# Patient Record
Sex: FEMALE | Race: ASIAN | NOT HISPANIC OR LATINO | Employment: UNEMPLOYED | URBAN - METROPOLITAN AREA
[De-identification: names, ages, dates, MRNs, and addresses within clinical notes are randomized per-mention and may not be internally consistent; named-entity substitution may affect disease eponyms.]

---

## 2017-01-03 ENCOUNTER — TELEPHONE (OUTPATIENT)
Dept: FAMILY MEDICINE | Facility: CLINIC | Age: 57
End: 2017-01-03

## 2017-01-03 DIAGNOSIS — K21.9 GASTROESOPHAGEAL REFLUX DISEASE WITHOUT ESOPHAGITIS: ICD-10-CM

## 2017-01-03 DIAGNOSIS — R47.02 DYSPHASIA: Primary | ICD-10-CM

## 2017-01-03 NOTE — TELEPHONE ENCOUNTER
PA for Pantoprazole has been requested. In order to summit to the insurance the medication will need diagnosis. List one.   Lakeisha Skaggs

## 2017-01-05 PROBLEM — K21.9 GASTROESOPHAGEAL REFLUX DISEASE WITHOUT ESOPHAGITIS: Status: ACTIVE | Noted: 2017-01-05

## 2017-01-15 DIAGNOSIS — M79.601 RIGHT UPPER LIMB PAIN: Primary | ICD-10-CM

## 2017-01-15 NOTE — TELEPHONE ENCOUNTER
lidocaine (LIDODERM) 5 % patch      Last Written Prescription Date: 11/15/16  Last Fill Quantity: 30,  # refills: 1   Last Office Visit with [facility], [facility] or [facility] prescribing provider: 11/8/16                                         Next 5 appointments (look out 90 days)     Mar 30, 2017 10:20 AM   Office Visit with Mulugeta Sharma MD   [facility] ([facility])    [address] [place] [id number] [phone]

## 2017-01-18 RX ORDER — LIDOCAINE 50 MG/G
PATCH TOPICAL
Qty: 30 PATCH | Refills: 1 | Status: SHIPPED | OUTPATIENT
Start: 2017-01-18 | End: 2017-03-30

## 2017-01-18 NOTE — TELEPHONE ENCOUNTER
Prescription approved per [facility] Refill Protocol.  IFRAH Petty, Clinical RN [facility].

## 2017-01-20 RX ORDER — LIDOCAINE 50 MG/G
PATCH TOPICAL
Qty: 30 PATCH | Refills: 1 | OUTPATIENT
Start: 2017-01-20

## 2017-01-26 ENCOUNTER — TRANSFERRED RECORDS (OUTPATIENT)
Dept: HEALTH INFORMATION MANAGEMENT | Facility: CLINIC | Age: 57
End: 2017-01-26

## 2017-03-08 DIAGNOSIS — Z87.730 PERSONAL HISTORY OF CORRECTED CLEFT LIP AND PALATE: ICD-10-CM

## 2017-03-08 NOTE — TELEPHONE ENCOUNTER
simvastatin (ZOCOR) 20 MG tablet     Last Written Prescription Date: 9/30/16  Last Fill Quantity: 90, # refills: 1  Last Office Visit with FM[facility], [facility] or [facility] prescribing provider: 11/8/16  Next 5 appointments (look out 90 days)     Mar 30, 2017 10:20 AM CDT   Office Visit with Mulugeta Sharma MD   [facility] ([facility])    [address] [place] [id number] [phone]                   Lab Results   Component Value Date    CHOL 167 09/30/2016     Lab Results   Component Value Date    HDL 38 09/30/2016     Lab Results   Component Value Date    LDL 83 09/30/2016    LDL 75 08/03/2015     Lab Results   Component Value Date    TRIG 228 09/30/2016     Lab Results   Component Value Date    CHOLHDLRATIO 5.6 02/03/2015           Lawson Faarax  [facility]

## 2017-03-13 RX ORDER — SIMVASTATIN 20 MG
TABLET ORAL
Qty: 90 TABLET | Refills: 0 | Status: SHIPPED | OUTPATIENT
Start: 2017-03-13 | End: 2017-03-30

## 2017-03-13 NOTE — TELEPHONE ENCOUNTER
Prescription(s) approved per [facility] Refill Protocol.    Mode Wharton RN

## 2017-03-20 DIAGNOSIS — I10 ESSENTIAL HYPERTENSION WITH GOAL BLOOD PRESSURE LESS THAN 140/90: ICD-10-CM

## 2017-03-20 NOTE — TELEPHONE ENCOUNTER
metoprolol (TOPROL-XL) 50 MG 24 hr tablet (Discontinued)      Last Written Prescription Date:  09/30/16  Last Fill Quantity: 90,   # refills: 1  Last Office Visit with [facility], [facility] or [facility] prescribing provider: 11/08/16  Future Office visit:    Next 5 appointments (look out 90 days)     Mar 30, 2017 10:20 AM CDT   Office Visit with Mulugeta Sharma MD   [facility] ([facility])    [address] [place] [id number] [phone]                   Routing refill request to provider for review/approval because:  Drug not active on patient's medication list      Alanis Martin  [facility]

## 2017-03-23 RX ORDER — METOPROLOL SUCCINATE 50 MG/1
TABLET, EXTENDED RELEASE ORAL
Qty: 90 TABLET | Refills: 1 | Status: SHIPPED | OUTPATIENT
Start: 2017-03-23 | End: 2017-09-12

## 2017-03-30 ENCOUNTER — OFFICE VISIT (OUTPATIENT)
Dept: FAMILY MEDICINE | Facility: CLINIC | Age: 57
End: 2017-03-30
Payer: COMMERCIAL

## 2017-03-30 ENCOUNTER — TELEPHONE (OUTPATIENT)
Dept: FAMILY MEDICINE | Facility: CLINIC | Age: 57
End: 2017-03-30

## 2017-03-30 VITALS
TEMPERATURE: 97 F | HEART RATE: 69 BPM | OXYGEN SATURATION: 95 % | SYSTOLIC BLOOD PRESSURE: 129 MMHG | HEIGHT: 59 IN | DIASTOLIC BLOOD PRESSURE: 72 MMHG

## 2017-03-30 DIAGNOSIS — K21.9 GASTROESOPHAGEAL REFLUX DISEASE WITHOUT ESOPHAGITIS: ICD-10-CM

## 2017-03-30 DIAGNOSIS — I10 ESSENTIAL HYPERTENSION WITH GOAL BLOOD PRESSURE LESS THAN 140/90: Primary | ICD-10-CM

## 2017-03-30 DIAGNOSIS — M79.601 RIGHT UPPER LIMB PAIN: ICD-10-CM

## 2017-03-30 DIAGNOSIS — G81.01 RIGHT FLACCID HEMIPLEGIA (H): ICD-10-CM

## 2017-03-30 DIAGNOSIS — M25.511 RIGHT SHOULDER PAIN, UNSPECIFIED CHRONICITY: ICD-10-CM

## 2017-03-30 DIAGNOSIS — Z86.73 HISTORY OF CVA (CEREBROVASCULAR ACCIDENT): ICD-10-CM

## 2017-03-30 LAB
ALT SERPL W P-5'-P-CCNC: 50 U/L (ref 0–50)
CHOLEST SERPL-MCNC: 150 MG/DL
CREAT SERPL-MCNC: 0.88 MG/DL (ref 0.52–1.04)
GFR SERPL CREATININE-BSD FRML MDRD: 67 ML/MIN/1.7M2
HDLC SERPL-MCNC: 46 MG/DL
LDLC SERPL CALC-MCNC: 72 MG/DL
NONHDLC SERPL-MCNC: 104 MG/DL
POTASSIUM SERPL-SCNC: 3.8 MMOL/L (ref 3.4–5.3)
TRIGL SERPL-MCNC: 162 MG/DL

## 2017-03-30 PROCEDURE — 36415 COLL VENOUS BLD VENIPUNCTURE: CPT | Performed by: FAMILY MEDICINE

## 2017-03-30 PROCEDURE — 80061 LIPID PANEL: CPT | Performed by: FAMILY MEDICINE

## 2017-03-30 PROCEDURE — 82565 ASSAY OF CREATININE: CPT | Performed by: FAMILY MEDICINE

## 2017-03-30 PROCEDURE — 99214 OFFICE O/P EST MOD 30 MIN: CPT | Performed by: FAMILY MEDICINE

## 2017-03-30 PROCEDURE — 84132 ASSAY OF SERUM POTASSIUM: CPT | Performed by: FAMILY MEDICINE

## 2017-03-30 PROCEDURE — 84460 ALANINE AMINO (ALT) (SGPT): CPT | Performed by: FAMILY MEDICINE

## 2017-03-30 RX ORDER — LOSARTAN POTASSIUM AND HYDROCHLOROTHIAZIDE 25; 100 MG/1; MG/1
TABLET ORAL
Qty: 90 TABLET | Refills: 0 | Status: SHIPPED | OUTPATIENT
Start: 2017-03-30 | End: 2017-06-02

## 2017-03-30 RX ORDER — NABUMETONE 500 MG/1
TABLET, FILM COATED ORAL
Qty: 60 TABLET | Refills: 5 | Status: SHIPPED | OUTPATIENT
Start: 2017-03-30 | End: 2017-07-15

## 2017-03-30 RX ORDER — SIMVASTATIN 20 MG
20 TABLET ORAL EVERY EVENING
Qty: 90 TABLET | Refills: 1 | Status: SHIPPED | OUTPATIENT
Start: 2017-03-30 | End: 2017-09-12

## 2017-03-30 RX ORDER — PANTOPRAZOLE SODIUM 40 MG/1
40 TABLET, DELAYED RELEASE ORAL DAILY
Qty: 90 TABLET | Refills: 3 | Status: SHIPPED | OUTPATIENT
Start: 2017-03-30 | End: 2018-03-19

## 2017-03-30 RX ORDER — BACLOFEN 10 MG/1
10 TABLET ORAL 3 TIMES DAILY PRN
Qty: 90 TABLET | Refills: 5 | Status: SHIPPED | OUTPATIENT
Start: 2017-03-30 | End: 2017-09-12

## 2017-03-30 RX ORDER — LIDOCAINE 50 MG/G
PATCH TOPICAL
Qty: 90 PATCH | Refills: 3 | Status: SHIPPED | OUTPATIENT
Start: 2017-03-30 | End: 2018-03-19

## 2017-03-30 NOTE — LETTER
[address] [address]  [phone]             April 3, 2017    Anita Bennett  [address] [facility] [address]          Ms. Bennett,     All of your test results were within the expected range for you. Enclosed are the results.  Results for orders placed or performed in visit on 03/30/17   Lipid panel reflex to direct LDL   Result Value Ref Range    Cholesterol 150 <200 mg/dL    Triglycerides 162 (H) <150 mg/dL    HDL Cholesterol 46 (L) >49 mg/dL    LDL Cholesterol Calculated 72 <100 mg/dL    Non HDL Cholesterol 104 <130 mg/dL   ALT   Result Value Ref Range    ALT 50 0 - 50 U/L   Potassium   Result Value Ref Range    Potassium 3.8 3.4 - 5.3 mmol/L   Creatinine   Result Value Ref Range    Creatinine 0.88 0.52 - 1.04 mg/dL    GFR Estimate 67 >60 mL/min/1.7m2    GFR Estimate If Black 81 >60 mL/min/1.7m2       Please contact the clinic if you have additional questions.  Thank you.     Sincerely,       Mulugeta Sharma MD/viridiana

## 2017-03-30 NOTE — PATIENT INSTRUCTIONS
This summary includes the important diagnoses, test, medications and other important parts of your medical history.  Below are a few good we sites you can use to learn more about these.     Www.[employer].org : Up to date and easily searchable information on multiple topics.  Www.[employer].org/Pharmacy/c_539084.asp : [facility] $4.99 medications  Www.medlineplus.gov : medication info, interactive tutorials, watch real surgeries online  Www.familydoctor.org : good info from the Academy of Family Physicians  Www.mayoNew Planet Technologiesinic.com : good info from the [facility]  Www.cdc.gov : public health info, travel advisories, epidemics (H1N1)  Www.aap.org : children's health info, normal development, vaccinations  Www.health.[facility].[place].us : [place] dept of heat, public health issues in [place], N1N1    Based on your medical history and these are the current health maintenance or preventive care services that you are due for (some may have been done at this visit:)  There are no preventive care reminders to display for this patient.  =================================================================================  Normal Values   Blood pressure  <140/90 for most adults    <130/80 for some chronic diseases (ask your care team about yours)    BMI (body mass index)  18.5-25 kg/m2 (based on height and weight)     Thank you for visiting [facility]    Normal or non-critical lab and imaging results will be communicated to you by MyChart, letter or phone within 7 days.  If you do not hear from us within 10 days, please call the clinic. If you have a critical or abnormal lab result, we will notify you by phone as soon as possible.     If you have any questions regarding your visit please contact:     Team Comfort:   Clinic Hours Telephone Number   Dr. Mulugeta Teague   7am-5pm  Monday - Friday [phone]  Mode SIMONS   Pharmacy 8am-8pm  Monday-Thursday      8am-6pm Friday  9am-5pm Saturday-Sunday [phone]   Urgent Care 11am-8pm Monday-Friday        9am-5pm Saturday-Sunday [phone]     After hours, weekend or if you need to make an appointment with your primary provider please call [phone].   After Hours nurse advise: call [place] [facility]: [phone]    Medication Refills:  Call your pharmacy and they will forward the refill to us. Please allow 3 business days for your refills to be completed.    Use [employer] (secure email communication and access to your chart) to send your primary care provider a message or make an appointment. Ask someone on your Team how to sign up for [employer]. To log on to [employer] or for more information in [employer] please visit the website at [email or web address].  As of October 8, 2013, all password changes, disabled accounts, or ID changes in RadioSh[employer]/MyHealth will be done by our Access Services Department.   If you need help with your account or password, call: [phone]. Clinic staff no longer has the ability to change passwords.

## 2017-03-30 NOTE — NURSING NOTE
"Chief Complaint   Patient presents with     Hypertension       Initial /72 (BP Location: Left arm, Patient Position: Chair, Cuff Size: Adult Regular)  Pulse 69  Temp 97  F (36.1  C) (Oral)  Ht 4' 11\" (1.499 m)  SpO2 95% Estimated body mass index is 26.26 kg/(m^2) as calculated from the following:    Height as of 11/8/16: 4' 11\" (1.499 m).    Weight as of 11/8/16: 130 lb (59 kg).  Medication Reconciliation: complete     Pa Mir Ramos MA      "

## 2017-03-30 NOTE — TELEPHONE ENCOUNTER
Dr. Sharma completed form for pt and the form is put in outgoing bin to be mailed to pt's home address. 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  Ashish Lunsford,  For Teams Comfort and Heart

## 2017-03-30 NOTE — MR AVS SNAPSHOT
After Visit Summary   3/30/2017    Anita Bennett    MRN: [id number]           Patient Information     Date Of Birth          1960        Visit Information        Provider Department      3/30/2017 10:15 AM Mulugeta Sharma MD; [employer] [facility]        Today's Diagnoses     Essential hypertension with goal blood pressure less than 140/90    -  1    History of CVA (cerebrovascular accident)        Right flaccid hemiplegia (H)        Right upper limb pain        Right shoulder pain, unspecified chronicity        Gastroesophageal reflux disease without esophagitis          Care Instructions    This summary includes the important diagnoses, test, medications and other important parts of your medical history.  Below are a few good we sites you can use to learn more about these.     Www.[employer] : Up to date and easily searchable information on multiple topics.  Www.[employer]/Pharmacy/c_539084.asp : [facility] $4.99 medications  Www.[employer].gov : medication info, interactive tutorials, watch real surgeries online  Www.familydoctor.org : good info from the Academy of Family Physicians  Www.mayoThrillist.cominic.com : good info from the [facility]  Www.cdc.gov : public health info, travel advisories, epidemics (H1N1)  Www.aap.org : children's health info, normal development, vaccinations  Www.health.[facility].mn.us : [place] dept of heatlh, public health issues in [place], N1N1    Based on your medical history and these are the current health maintenance or preventive care services that you are due for (some may have been done at this visit:)  There are no preventive care reminders to display for this patient.  =================================================================================  Normal Values   Blood pressure  <140/90 for most adults    <130/80 for some chronic diseases (ask your care team about yours)    BMI (body mass index)  18.5-25 kg/m2 (based on height  and weight)     Thank you for visiting [facility]    Normal or non-critical lab and imaging results will be communicated to you by MyChart, letter or phone within 7 days.  If you do not hear from us within 10 days, please call the clinic. If you have a critical or abnormal lab result, we will notify you by phone as soon as possible.     If you have any questions regarding your visit please contact:     Team Comfort:   Clinic Hours Telephone Number   Dr. Mulugeta Teague   7am-5pm  Monday - Friday [phone]  Mode RN   Pharmacy 8am-8pm Monday-Thursday      8am-6pm Friday  9am-5pm Saturday-Sunday [phone]   Urgent Care 11am-8pm Monday-Friday        9am-5pm Saturday-Sunday [phone]     After hours, weekend or if you need to make an appointment with your primary provider please call [phone].   After Hours nurse advise: call [place] [facility]: [phone]    Medication Refills:  Call your pharmacy and they will forward the refill to us. Please allow 3 business days for your refills to be completed.    Use [employer]OOKSIMA (secure email communication and access to your chart) to send your primary care provider a message or make an appointment. Ask someone on your Team how to sign up for [employer]IMA. To log on to [employer] or for more information in [employer] please visit the website at [email or web address].  As of October 8, 2013, all password changes, disabled accounts, or ID changes in LOOKSIMA/MyHealth will be done by our Access Services Department.   If you need help with your account or password, call: [phone]. Clinic staff no longer has the ability to change passwords.           Follow-ups after your visit        Follow-up notes from your care team     Return in about 6 months (around 9/18/2017) for full physical, blood pressure check, lab tests.      Who to contact     If you have questions or need  "follow up information about today's clinic visit or your schedule please contact [facility] directly at [phone].  Normal or non-critical lab and imaging results will be communicated to you by MyChart, letter or phone within 4 business days after the clinic has received the results. If you do not hear from us within 7 days, please contact the clinic through Algol[employer] or phone. If you have a critical or abnormal lab result, we will notify you by phone as soon as possible.  Submit refill requests through [employer] or call your pharmacy and they will forward the refill request to us. Please allow 3 business days for your refill to be completed.          Additional Information About Your Visit        Algolia[employer]Net Information     [employer] lets you send messages to your doctor, view your test results, renew your prescriptions, schedule appointments and more. To sign up, go to [email or web address] . Click on \"Log in\" on the left side of the screen, which will take you to the Welcome page. Then click on \"Sign up Now\" on the right side of the page.     You will be asked to enter the access code listed below, as well as some personal information. Please follow the directions to create your username and password.     Your access code is: [email or web address]  Expires: 2017 11:00 AM     Your access code will  in 90 days. If you need help or a new code, please call your [place] clinic or [phone].        Care EveryWhere ID     This is your Care EveryWhere ID. This could be used by other organizations to access your [place] medical records  [id number]        Your Vitals Were     Pulse Temperature Height Pulse Oximetry          69 97  F (36.1  C) (Oral) 4' 11\" (1.499 m) 95%         Blood Pressure from Last 3 Encounters:   17 129/72   16 120/72   10/31/16 112/70    Weight from Last 3 Encounters:   16 130 lb (59 kg)   16 130 lb (59 kg)   12/10/15 130 lb (59 kg)              We " Performed the Following     ALT     Creatinine     Lipid panel reflex to direct LDL     Potassium          Today's Medication Changes          These changes are accurate as of: 3/30/17 11:00 AM.  If you have any questions, ask your nurse or doctor.               These medicines have changed or have updated prescriptions.        Dose/Directions    lidocaine 5 % Patch   Commonly known as:  LIDODERM   This may have changed:  See the new instructions.   Used for:  Right upper limb pain, Right flaccid hemiplegia (H)   Changed by:  Mulugeta Sharma MD        APPLY 1 PATCH TO PAINFUL AREA FOR UP TO 12 HOURS WITHIN A 24 HOUR PERIOD. REMOVE AFTER 12 HOURS.   Quantity:  90 patch   Refills:  3       nabumetone 500 MG tablet   Commonly known as:  RELAFEN   This may have changed:  See the new instructions.   Used for:  Right shoulder pain, unspecified chronicity, Right upper limb pain   Changed by:  Mulugeta Sharma MD        TAKE 1-2 TABLETS BY MOUTH TWICE A DAY AS NEEDED FOR PAIN IN ARM OR HIP, TAKE WITH FOOD.   Quantity:  60 tablet   Refills:  5       pantoprazole 40 MG EC tablet   Commonly known as:  PROTONIX   This may have changed:  See the new instructions.   Used for:  Gastroesophageal reflux disease without esophagitis   Changed by:  Mulugeta Sharma MD        Dose:  40 mg   Take 1 tablet (40 mg) by mouth daily for reflux.   Quantity:  90 tablet   Refills:  3            Where to get your medicines      These medications were sent to [facility]/pharmacy #[id number] - [place], [place] - [address]  [address] [address]     Phone:  [phone]     baclofen 10 MG tablet    losartan-hydrochlorothiazide 100-25 MG per tablet    nabumetone 500 MG tablet    pantoprazole 40 MG EC tablet    simvastatin 20 MG tablet         Call your pharmacy to confirm that your medication is ready for pickup. It may take up to 24 hours for them to receive the prescription. If the prescription is not ready within 3  business days, please contact your clinic or your provider.     We will let you know when these medications are ready. If you don't hear back within 3 business days, please contact us.     lidocaine 5 % Patch                Primary Care Provider Office Phone # Fax #    Mulugeta Sharma [phone]       [facility] [address]  [facility] [address]        Thank you!     Thank you for choosing [facility]  for your care. Our goal is always to provide you with excellent care. Hearing back from our patients is one way we can continue to improve our services. Please take a few minutes to complete the written survey that you may receive in the mail after your visit with us. Thank you!             Your Updated Medication List - Protect others around you: Learn how to safely use, store and throw away your medicines at www.disposemymeds.org.          This list is accurate as of: 3/30/17 11:00 AM.  Always use your most recent med list.                   Brand Name Dispense Instructions for use    azelastine 0.05 % Soln ophthalmic solution    OPTIVAR    1 Bottle    Apply 1 drop to eye 2 times daily       baclofen 10 MG tablet    LIORESAL    90 tablet    Take 1 tablet (10 mg) by mouth 3 times daily as needed for muscle spasms (arm pain)       [facility] ASPIRIN 325 MG tablet   Generic drug:  aspirin     90 tablet    TAKE 1 TABLET BY MOUTH DAILY       gabapentin 300 MG capsule    NEURONTIN    270 capsule    Take 1 capsule (300 mg) by mouth 3 times daily for arm mobility.       lidocaine 5 % Patch    LIDODERM    90 patch    APPLY 1 PATCH TO PAINFUL AREA FOR UP TO 12 HOURS WITHIN A 24 HOUR PERIOD. REMOVE AFTER 12 HOURS.       losartan-hydrochlorothiazide 100-25 MG per tablet    HYZAAR    90 tablet    1/2 tablet daily as needed for high blood pressure.       metoprolol 50 MG 24 hr tablet    TOPROL-XL    90 tablet    TAKE 1 TABLET (50 MG) BY MOUTH DAILY FOR BLOOD PRESSURE.        nabumetone 500 MG tablet    RELAFEN    60 tablet    TAKE 1-2 TABLETS BY MOUTH TWICE A DAY AS NEEDED FOR PAIN IN ARM OR HIP, TAKE WITH FOOD.       order for DME     1 Month    Equipment being ordered: Blood pressure machine. Large Gloves. Large Adult Diapers/Depends. Disposable pad. Sheet skin bed pad. Quantity limits and brand per insurance preference.       pantoprazole 40 MG EC tablet    PROTONIX    90 tablet    Take 1 tablet (40 mg) by mouth daily for reflux.       polyvinyl alcohol 1.4 % ophthalmic solution    ARTIFICIAL TEARS    6 mL    Place 1 drop into both eyes 4 times daily       senna-docusate 8.6-50 MG per tablet    SENOKOT-S;PERICOLACE    30 tablet    Take 1 tablet by mouth daily as needed for constipation       simvastatin 20 MG tablet    ZOCOR    90 tablet    Take 1 tablet (20 mg) by mouth every evening for cholesterol.       sulfacetamide-prednisoLONE 10-0.23 % ophthalmic solution    VASOCIDIN    5 mL    Place 1 drop into the right eye 2 times daily as needed , or as directed by Dr. Rai.

## 2017-03-30 NOTE — PROGRESS NOTES
"  SUBJECTIVE:                                                    Anita Bennett is a 56 year old female who presents to clinic today for the following health issues:  She is accompanied by her  and .     Hypertension Follow-up      Outpatient blood pressures are being checked at home. Patient brought in BP log. Results are 130/80.    Low Salt Diet: low salt       Amount of exercise or physical activity: 6-7 days/week for an average of less than 15 minutes    Problems taking medications regularly: No    Medication side effects: none    Diet: low salt    Past medical, family, and social histories, medications, and allergies are reviewed and updated in Epic.     ROS:  Constitutional, HEENT, cardiovascular, pulmonary, gi and gu systems are negative, except as otherwise noted.      Any history above obtained by the Medical Assistant was reviewed by Dr. Mulugeta Sharma MD, and edited when necessary.    This document serves as a record of the services and decisions personally performed and made by Dr. Sharma. It was created on his behalf by Kerry Zarate, a trained medical scribe. The creation of this document is based on the provider's statements to the medical scribe.  Kerry Zarate March 30, 2017 10:37 AM   OBJECTIVE:                                                    /72 (BP Location: Left arm, Patient Position: Chair, Cuff Size: Adult Regular)  Pulse 69  Temp 97  F (36.1  C) (Oral)  Ht 4' 11\" (1.499 m)  SpO2 95%  There is no height or weight on file to calculate BMI.  GENERAL: healthy, alert and no distress  EYES: Eyes grossly normal to inspection, PERRL and conjunctivae and sclerae normal  RESP: lungs clear to auscultation - no rales, rhonchi or wheezes  CV: regular rate and rhythm, normal S1 S2, no S3 or S4, no murmur, click or rub, no peripheral edema and peripheral pulses strong  MS: no gross musculoskeletal defects noted, no edema. Sitting in wheelchair.   SKIN: no suspicious lesions or " raúl  NEURO: RUE flaccid paralysis, RLE with a brace to counter her foot drop, sensory exam grossly normal, mentation intact and speech present, however, I cannot assess it since it is not English ( speaks for her most of the time).   PSYCH: mentation appears normal, affect normal/bright     ASSESSMENT/PLAN:                                                    (I10) Essential hypertension with goal blood pressure less than 140/90  (primary encounter diagnosis)  Comment: well controlled  Plan: losartan-hydrochlorothiazide (HYZAAR) 100-25 MG        per tablet, Potassium, Creatinine        Follow up in 6 months at E    (Z86.73) History of CVA (cerebrovascular accident)  Comment: fasting. Medications updated.   Plan: baclofen (LIORESAL) 10 MG tablet, simvastatin         (ZOCOR) 20 MG tablet, Lipid panel reflex to         direct LDL, ALT            (G81.01) Right flaccid hemiplegia (H)  (M79.601) Right upper limb pain  (M25.511) Right shoulder pain, unspecified chronicity  Comment: refill request. She reports that the lidocaine helps when it is available.   Plan: lidocaine (LIDODERM) 5 % Patch, nabumetone         (RELAFEN) 500 MG tablet            (K21.9) Gastroesophageal reflux disease without esophagitis  Comment: prescription update   Plan: pantoprazole (PROTONIX) 40 MG EC tablet               The information in this document, created by the medical scribe for me, accurately reflects the services I personally performed and the decisions made by me. I have reviewed and approved this document for accuracy prior to leaving the patient care area.    Mulugeta Sharma MD

## 2017-04-04 DIAGNOSIS — Z86.73 HISTORY OF CVA (CEREBROVASCULAR ACCIDENT): ICD-10-CM

## 2017-04-05 RX ORDER — SIMVASTATIN 20 MG
TABLET ORAL
Qty: 90 TABLET | Refills: 0 | OUTPATIENT
Start: 2017-04-05

## 2017-04-18 ENCOUNTER — TELEPHONE (OUTPATIENT)
Dept: FAMILY MEDICINE | Facility: CLINIC | Age: 57
End: 2017-04-18

## 2017-04-18 NOTE — TELEPHONE ENCOUNTER
Medication: pantoprazole (PROTONIX) 40 MG EC tablet    Diagnosis & Code : Gastroesophageal reflux disease without esophagitis [K21.9]         Provider: What other medications tried, failed, when and why discontinue?      Prior Authorization Starts (date): 4/18/2017    Insurance Plan: [employer]  Patient ID: [id number]  Phone: [phone]  On the phone  Route it to the team comfort  Postponed for 3 business days as protocol.  Lakeisha Skaggs

## 2017-04-18 NOTE — TELEPHONE ENCOUNTER
Prior Authorization for pantoprazole (PROTONIX) 40 MG EC tablet was approved on April 18, 2017.  Effective date: 4/18/2018  PA reference #: [id number]  Pharmacy was notified by plan  Patient will be notified by the insurance plan. Letter sent by insurance company and letter forwarded to Abstracting team.  Lakeisha Skaggs

## 2017-04-18 NOTE — TELEPHONE ENCOUNTER
Faxed Pa request from [facility]    Plan does not cover pantoprazole (PROTONIX) 40 MG EC tablet.  Please call [phone] to initiate Prior Auth or change med.    Quantity limits exceeded.     ID# [id number]      [facility]

## 2017-04-26 DIAGNOSIS — I10 ESSENTIAL HYPERTENSION WITH GOAL BLOOD PRESSURE LESS THAN 140/90: ICD-10-CM

## 2017-04-26 NOTE — TELEPHONE ENCOUNTER
losartan-hydrochlorothiazide (HYZAAR) 100-25 MG per tablet      Last Written Prescription Date: 3/30/17  Last Fill Quantity: 90, # refills: 0  Last Office Visit with [facility], [facility] or [facility] prescribing provider: 3/30/17       Potassium   Date Value Ref Range Status   03/30/2017 3.8 3.4 - 5.3 mmol/L Final     Creatinine   Date Value Ref Range Status   03/30/2017 0.88 0.52 - 1.04 mg/dL Final     BP Readings from Last 3 Encounters:   03/30/17 129/72   11/08/16 120/72   10/31/16 112/70

## 2017-05-01 RX ORDER — LOSARTAN POTASSIUM AND HYDROCHLOROTHIAZIDE 25; 100 MG/1; MG/1
TABLET ORAL
Qty: 90 TABLET | Refills: 0 | OUTPATIENT
Start: 2017-05-01

## 2017-05-01 NOTE — TELEPHONE ENCOUNTER
Six month supply was given in March, refill request too soon, pharmacy notified.     IFRAH Petty, Clinical RN [facility].

## 2017-05-04 ENCOUNTER — TRANSFERRED RECORDS (OUTPATIENT)
Dept: HEALTH INFORMATION MANAGEMENT | Facility: CLINIC | Age: 57
End: 2017-05-04

## 2017-05-13 DIAGNOSIS — Z53.9 DIAGNOSIS NOT YET DEFINED: ICD-10-CM

## 2017-05-13 NOTE — TELEPHONE ENCOUNTER
senna-docusate (SENOKOT-S;PERICOLACE) 8.6-50 MG per tablet      Last Written Prescription Date: 5/18/16  Last Fill Quantity: 30,  # refills: 11  Last Office Visit with [facility], [facility] or [facility] prescribing provider: 3/30/17          Lawson Lockett  [facility]

## 2017-05-17 NOTE — TELEPHONE ENCOUNTER
Routing refill request to provider for review/approval because:  Diagnosis needed.  Tammie Yang RN

## 2017-05-18 RX ORDER — INCONTINENCE PAD,LINER,DISP
EACH MISCELLANEOUS
Qty: 30 TABLET | Refills: 11 | Status: SHIPPED | OUTPATIENT
Start: 2017-05-18 | End: 2018-03-28

## 2017-05-28 DIAGNOSIS — I10 ESSENTIAL HYPERTENSION WITH GOAL BLOOD PRESSURE LESS THAN 140/90: ICD-10-CM

## 2017-05-28 NOTE — TELEPHONE ENCOUNTER
losartan-hydrochlorothiazide (HYZAAR) 100-25 MG per tablet      Last Written Prescription Date: 3/30/17  Last Fill Quantity: 90, # refills: 0  Last Office Visit with [facility], [facility] or [facility] prescribing provider: 3/30/17       Potassium   Date Value Ref Range Status   03/30/2017 3.8 3.4 - 5.3 mmol/L Final     Creatinine   Date Value Ref Range Status   03/30/2017 0.88 0.52 - 1.04 mg/dL Final     BP Readings from Last 3 Encounters:   03/30/17 129/72   11/08/16 120/72   10/31/16 112/70           Lawson Faarax  [facility]

## 2017-06-02 RX ORDER — LOSARTAN POTASSIUM AND HYDROCHLOROTHIAZIDE 25; 100 MG/1; MG/1
TABLET ORAL
Qty: 90 TABLET | Refills: 0 | Status: SHIPPED | OUTPATIENT
Start: 2017-06-02 | End: 2017-09-12

## 2017-06-02 NOTE — TELEPHONE ENCOUNTER
Prescription approved per [facility] [facility].  Ramya Barnes RN

## 2017-06-08 DIAGNOSIS — Z86.73 HISTORY OF CVA (CEREBROVASCULAR ACCIDENT): ICD-10-CM

## 2017-06-09 RX ORDER — SIMVASTATIN 20 MG
TABLET ORAL
Qty: 90 TABLET | Refills: 0 | OUTPATIENT
Start: 2017-06-09

## 2017-06-09 NOTE — TELEPHONE ENCOUNTER
The following prescription was sent to [facility] in [place], [place]:    simvastatin (ZOCOR) 20 MG tablet 90 tablet 1 3/30/2017  No   Sig: Take 1 tablet (20 mg) by mouth every evening for cholesterol.   Class: E-Prescribe   Route: Oral   Order: [id number]   E-Prescribing Status: Receipt confirmed by pharmacy (3/30/2017 10:59 AM CDT)     Request denied.  Too soon to fill.  Tammie Yang RN

## 2017-06-14 DIAGNOSIS — Z53.9 DIAGNOSIS NOT YET DEFINED: Primary | ICD-10-CM

## 2017-06-14 PROCEDURE — 99207 C MD RECERTIFICATION HHA PT: CPT | Performed by: FAMILY MEDICINE

## 2017-07-10 DIAGNOSIS — H10.13 ALLERGIC CONJUNCTIVITIS, BILATERAL: ICD-10-CM

## 2017-07-10 RX ORDER — AZELASTINE HYDROCHLORIDE 0.5 MG/ML
1 SOLUTION/ DROPS OPHTHALMIC 2 TIMES DAILY
Qty: 1 BOTTLE | Refills: 6 | Status: SHIPPED | OUTPATIENT
Start: 2017-07-10 | End: 2018-07-13

## 2017-07-15 DIAGNOSIS — M79.601 RIGHT UPPER LIMB PAIN: ICD-10-CM

## 2017-07-15 DIAGNOSIS — M25.511 RIGHT SHOULDER PAIN, UNSPECIFIED CHRONICITY: ICD-10-CM

## 2017-07-15 NOTE — TELEPHONE ENCOUNTER
nabumetone (RELAFEN) 500 MG tablet      Last Written Prescription Date: 3/30/17  Last Quantity: 60, # refills: 5  Last Office Visit with [facility], P or [facility] prescribing provider: 3/30/17  Next 5 appointments (look out 90 days)     Sep 12, 2017  9:00 AM CDT   Office Visit with Mulugeta Sharma MD   [facility] ([facility]    [address] [place] [id number] [phone]                   Creatinine   Date Value Ref Range Status   03/30/2017 0.88 0.52 - 1.04 mg/dL Final     No results found for: AST  Lab Results   Component Value Date    ALT 50 03/30/2017     BP Readings from Last 3 Encounters:   03/30/17 129/72   11/08/16 120/72   10/31/16 112/70

## 2017-07-18 RX ORDER — NABUMETONE 500 MG/1
TABLET, FILM COATED ORAL
Qty: 60 TABLET | Refills: 5 | Status: SHIPPED | OUTPATIENT
Start: 2017-07-18 | End: 2018-01-13

## 2017-07-18 NOTE — TELEPHONE ENCOUNTER
Prescription approved per [facility]ill [facility].  IFRAH Petty, Clinical RN Jade Palacios.

## 2017-08-07 DIAGNOSIS — K21.9 GASTROESOPHAGEAL REFLUX DISEASE WITHOUT ESOPHAGITIS: ICD-10-CM

## 2017-08-08 NOTE — TELEPHONE ENCOUNTER
pantoprazole (PROTONIX) 40 MG EC tablet      Last Written Prescription Date: 3/30/17  Last Fill Quantity: 90,  # refills: 3   Last Office Visit with [facility], [facility] or [facility] prescribing provider: 3/30/17                                         Next 5 appointments (look out 90 days)     Sep 12, 2017  9:00 AM CDT   Office Visit with Mulugeta Sharma MD   [facility]    [address] [place] [id number] [phone]                    Yuil Garrison  [facility]

## 2017-08-12 RX ORDER — PANTOPRAZOLE SODIUM 40 MG/1
TABLET, DELAYED RELEASE ORAL
Qty: 90 TABLET | Refills: 2 | OUTPATIENT
Start: 2017-08-12

## 2017-08-12 NOTE — TELEPHONE ENCOUNTER
Medication is being denied due to: RX was sent 03/30/2017 for #90 with 3 refills.  No additional refills should be needed at this time.   Ramya Barnes RN

## 2017-08-24 ENCOUNTER — TRANSFERRED RECORDS (OUTPATIENT)
Dept: HEALTH INFORMATION MANAGEMENT | Facility: CLINIC | Age: 57
End: 2017-08-24

## 2017-09-12 ENCOUNTER — TELEPHONE (OUTPATIENT)
Dept: FAMILY MEDICINE | Facility: CLINIC | Age: 57
End: 2017-09-12

## 2017-09-12 ENCOUNTER — OFFICE VISIT (OUTPATIENT)
Dept: FAMILY MEDICINE | Facility: CLINIC | Age: 57
End: 2017-09-12
Payer: MEDICARE

## 2017-09-12 VITALS
BODY MASS INDEX: 26.21 KG/M2 | HEART RATE: 74 BPM | HEIGHT: 59 IN | WEIGHT: 130 LBS | SYSTOLIC BLOOD PRESSURE: 132 MMHG | DIASTOLIC BLOOD PRESSURE: 74 MMHG | TEMPERATURE: 98.5 F | OXYGEN SATURATION: 98 %

## 2017-09-12 DIAGNOSIS — G81.01 RIGHT FLACCID HEMIPLEGIA (H): ICD-10-CM

## 2017-09-12 DIAGNOSIS — Z23 NEED FOR INFLUENZA VACCINATION: ICD-10-CM

## 2017-09-12 DIAGNOSIS — Z86.73 HISTORY OF CVA (CEREBROVASCULAR ACCIDENT): ICD-10-CM

## 2017-09-12 DIAGNOSIS — I10 ESSENTIAL HYPERTENSION WITH GOAL BLOOD PRESSURE LESS THAN 140/90: Primary | ICD-10-CM

## 2017-09-12 LAB
ALT SERPL W P-5'-P-CCNC: 17 U/L (ref 0–50)
ANION GAP SERPL CALCULATED.3IONS-SCNC: 6 MMOL/L (ref 3–14)
BUN SERPL-MCNC: 18 MG/DL (ref 7–30)
CALCIUM SERPL-MCNC: 8.8 MG/DL (ref 8.5–10.1)
CHLORIDE SERPL-SCNC: 108 MMOL/L (ref 94–109)
CHOLEST SERPL-MCNC: 146 MG/DL
CO2 SERPL-SCNC: 28 MMOL/L (ref 20–32)
CREAT SERPL-MCNC: 0.89 MG/DL (ref 0.52–1.04)
GFR SERPL CREATININE-BSD FRML MDRD: 65 ML/MIN/1.7M2
GLUCOSE SERPL-MCNC: 96 MG/DL (ref 70–99)
HDLC SERPL-MCNC: 43 MG/DL
LDLC SERPL CALC-MCNC: 77 MG/DL
NONHDLC SERPL-MCNC: 103 MG/DL
POTASSIUM SERPL-SCNC: 3.7 MMOL/L (ref 3.4–5.3)
SODIUM SERPL-SCNC: 142 MMOL/L (ref 133–144)
TRIGL SERPL-MCNC: 128 MG/DL

## 2017-09-12 PROCEDURE — 90686 IIV4 VACC NO PRSV 0.5 ML IM: CPT | Performed by: FAMILY MEDICINE

## 2017-09-12 PROCEDURE — 80061 LIPID PANEL: CPT | Performed by: FAMILY MEDICINE

## 2017-09-12 PROCEDURE — 99214 OFFICE O/P EST MOD 30 MIN: CPT | Mod: 25 | Performed by: FAMILY MEDICINE

## 2017-09-12 PROCEDURE — 36415 COLL VENOUS BLD VENIPUNCTURE: CPT | Performed by: FAMILY MEDICINE

## 2017-09-12 PROCEDURE — 84460 ALANINE AMINO (ALT) (SGPT): CPT | Performed by: FAMILY MEDICINE

## 2017-09-12 PROCEDURE — G0008 ADMIN INFLUENZA VIRUS VAC: HCPCS | Performed by: FAMILY MEDICINE

## 2017-09-12 PROCEDURE — 80048 BASIC METABOLIC PNL TOTAL CA: CPT | Performed by: FAMILY MEDICINE

## 2017-09-12 PROCEDURE — T1013 SIGN LANG/ORAL INTERPRETER: HCPCS | Mod: U3 | Performed by: FAMILY MEDICINE

## 2017-09-12 RX ORDER — LOSARTAN POTASSIUM AND HYDROCHLOROTHIAZIDE 25; 100 MG/1; MG/1
TABLET ORAL
Qty: 90 TABLET | Refills: 0 | Status: SHIPPED | OUTPATIENT
Start: 2017-09-12 | End: 2018-03-19

## 2017-09-12 RX ORDER — GABAPENTIN 300 MG/1
300 CAPSULE ORAL 3 TIMES DAILY
Qty: 270 CAPSULE | Refills: 3 | Status: SHIPPED | OUTPATIENT
Start: 2017-09-12 | End: 2017-09-13

## 2017-09-12 RX ORDER — BACLOFEN 10 MG/1
10 TABLET ORAL 3 TIMES DAILY PRN
Qty: 90 TABLET | Refills: 5 | Status: SHIPPED | OUTPATIENT
Start: 2017-09-12 | End: 2018-03-19

## 2017-09-12 RX ORDER — METOPROLOL SUCCINATE 50 MG/1
TABLET, EXTENDED RELEASE ORAL
Qty: 90 TABLET | Refills: 1 | Status: SHIPPED | OUTPATIENT
Start: 2017-09-12 | End: 2018-03-19

## 2017-09-12 RX ORDER — SIMVASTATIN 20 MG
20 TABLET ORAL EVERY EVENING
Qty: 90 TABLET | Refills: 1 | Status: SHIPPED | OUTPATIENT
Start: 2017-09-12 | End: 2018-03-19

## 2017-09-12 ASSESSMENT — PAIN SCALES - GENERAL: PAINLEVEL: NO PAIN (0)

## 2017-09-12 NOTE — PATIENT INSTRUCTIONS
================================================================================  Normal Values   Blood pressure  <140/90 for most adults    <130/80 for some chronic diseases (ask your care team about yours)    BMI (body mass index)  18.5-25 kg/m2 (based on height and weight)     Thank you for visiting [facility]    Normal or non-critical lab and imaging results will be communicated to you by MyChart, letter or phone within 7 days.  If you do not hear from us within 10 days, please call the clinic. If you have a critical or abnormal lab result, we will notify you by phone as soon as possible.     If you have any questions regarding your visit please contact:     Team Comfort:   Clinic Hours Telephone Number   Dr. Mulugeta Marie 7am-5pm  Monday - Friday [phone]  Jaspreet Cho RN   Pharmacy 8:00am-8pm Monday-Friday    9am-5pm Saturday-Sunday [phone]   Urgent Care 11am-9pm Monday-Friday        9am-5pm Saturday-Sunday [phone]     After hours, weekend or if you need to make an appointment with your primary provider please call [phone].   After Hours nurse advise: call [facility]: [phone]    Medication Refills:  Call your pharmacy and they will forward the refill to us. Please allow 3 business days for your refills to be completed.

## 2017-09-12 NOTE — MR AVS SNAPSHOT
After Visit Summary   9/12/2017    Anita Bennett    MRN: [id number]           Patient Information     Date Of Birth          1960        Visit Information        Provider Department      9/12/2017 8:45 AM Mulugeta Sharma MD; [employer] [facility]        Today's Diagnoses     Essential hypertension with goal blood pressure less than 140/90    -  1    History of CVA (cerebrovascular accident)        Right flaccid hemiplegia (H)        Need for influenza vaccination          Care Instructions        ================================================================================  Normal Values   Blood pressure  <140/90 for most adults    <130/80 for some chronic diseases (ask your care team about yours)    BMI (body mass index)  18.5-25 kg/m2 (based on height and weight)     Thank you for visiting [facility]    Normal or non-critical lab and imaging results will be communicated to you by MyChart, letter or phone within 7 days.  If you do not hear from us within 10 days, please call the clinic. If you have a critical or abnormal lab result, we will notify you by phone as soon as possible.     If you have any questions regarding your visit please contact:     Team Comfort:   Clinic Hours Telephone Number   Dr. Mulugeta Marie 7am-5pm  Monday - Friday [phone]  Jaspreet Cho RN   Pharmacy 8:00am-8pm Monday-Friday    9am-5pm Saturday-Sunday [phone]   Urgent Care 11am-9pm Monday-Friday        9am-5pm Saturday-Sunday [phone]     After hours, weekend or if you need to make an appointment with your primary provider please call [phone].   After Hours nurse advise: call [facility]: [phone]    Medication Refills:  Call your pharmacy and they will forward the refill to us. Please allow 3 business days for your refills to be  "completed.                  Follow-ups after your visit        Follow-up notes from your care team     Return in about 6 months (around 3/12/2018) for blood pressure check, lab tests.      Who to contact     If you have questions or need follow up information about today's clinic visit or your schedule please contact [facility] [place] directly at [phone].  Normal or non-critical lab and imaging results will be communicated to you by MyChart, letter or phone within 4 business days after the clinic has received the results. If you do not hear from us within 7 days, please contact the clinic through [facility] or phone. If you have a critical or abnormal lab result, we will notify you by phone as soon as possible.  Submit refill requests through [employer] or call your pharmacy and they will forward the refill request to us. Please allow 3 business days for your refill to be completed.          Additional Information About Your Visit        [facility] Information     [employer] lets you send messages to your doctor, view your test results, renew your prescriptions, schedule appointments and more. To sign up, go to [email or web address] . Click on \"Log in\" on the left side of the screen, which will take you to the Welcome page. Then click on \"Sign up Now\" on the right side of the page.     You will be asked to enter the access code listed below, as well as some personal information. Please follow the directions to create your username and password.     Your access code is: [email or web address]  Expires: 2017  9:43 AM     Your access code will  in 90 days. If you need help or a new code, please call your [facility] clinic or [phone].        Care EveryWhere ID     This is your Care EveryWhere ID. This could be used by other organizations to access your [facility] medical records  [id number]        Your Vitals Were     Pulse Temperature Height Pulse Oximetry BMI (Body Mass Index)       74 98.5  F (36.9  C) " "(Oral) 1.499 m (4' 11\") 98% 26.26 kg/m2        Blood Pressure from Last 3 Encounters:   09/12/17 132/74   03/30/17 129/72   11/08/16 120/72    Weight from Last 3 Encounters:   09/12/17 59 kg (130 lb)   11/08/16 59 kg (130 lb)   02/08/16 59 kg (130 lb)              We Performed the Following     ALT     Basic metabolic panel     HC FLU VAC PRESRV FREE QUAD SPLIT VIR 3+YRS IM     Lipid panel reflex to direct LDL          Today's Medication Changes          These changes are accurate as of: 9/12/17  9:43 AM.  If you have any questions, ask your nurse or doctor.               These medicines have changed or have updated prescriptions.        Dose/Directions    losartan-hydrochlorothiazide 100-25 MG per tablet   Commonly known as:  HYZAAR   This may have changed:  See the new instructions.   Used for:  Essential hypertension with goal blood pressure less than 140/90   Changed by:  Mulugeta Sharma MD        TAKE 1/2 TABLET BY MOUTH DAILY AS NEEDED FOR HIGH BLOOD PRESSURE.   Quantity:  90 tablet   Refills:  0       metoprolol 50 MG 24 hr tablet   Commonly known as:  TOPROL-XL   This may have changed:  See the new instructions.   Used for:  Essential hypertension with goal blood pressure less than 140/90   Changed by:  Mulugeta Sharma MD        TAKE 1 TABLET (50 MG) BY MOUTH DAILY FOR BLOOD PRESSURE.   Quantity:  90 tablet   Refills:  1            Where to get your medicines      These medications were sent to [facility]/pharmacy #[id number] - [place], [place] - [address]  [address] [address]     Phone:  [phone]     baclofen 10 MG tablet    gabapentin 300 MG capsule    losartan-hydrochlorothiazide 100-25 MG per tablet    metoprolol 50 MG 24 hr tablet    simvastatin 20 MG tablet                Primary Care Provider Office Phone # Fax #    Mulugeta Sharma [phone]       [address]  [facility] [address]        Equal Access to Services     MARIELA GUERRERO AH: Hadii aad ellis " jesse Westfall, waaxda luqadaha, qaybta kaalmada flower, yamil oakes lacharleefernando hemant. So [facility] [phone].    ATENCIÓN: Si gloriala bolivar, tiene a lucero disposición servicios gratuitos de asistencia lingüística. Didier al [phone].    We comply with applicable federal civil rights laws and [place] laws. We do not discriminate on the basis of race, color, national origin, age, disability sex, sexual orientation or gender identity.            Thank you!     Thank you for choosing [facility]  for your care. Our goal is always to provide you with excellent care. Hearing back from our patients is one way we can continue to improve our services. Please take a few minutes to complete the written survey that you may receive in the mail after your visit with us. Thank you!             Your Updated Medication List - Protect others around you: Learn how to safely use, store and throw away your medicines at www.disposemymeds.org.          This list is accurate as of: 9/12/17  9:43 AM.  Always use your most recent med list.                   Brand Name Dispense Instructions for use Diagnosis    azelastine 0.05 % Soln ophthalmic solution    OPTIVAR    1 Bottle    Apply 1 drop to eye 2 times daily    Allergic conjunctivitis, bilateral       baclofen 10 MG tablet    LIORESAL    90 tablet    Take 1 tablet (10 mg) by mouth 3 times daily as needed for muscle spasms (arm pain)    Right flaccid hemiplegia (H), History of CVA (cerebrovascular accident)       [facility] ASPIRIN 325 MG tablet   Generic drug:  aspirin     90 tablet    TAKE 1 TABLET BY MOUTH DAILY    Personal history of transient cerebral ischemia       [facility] SENNA PLUS 8.6-50 MG per tablet   Generic drug:  senna-docusate     30 tablet    TAKE 1 TABLET BY MOUTH DAILY AS NEEDED FOR CONSTIPATION    DIAGNOSIS NOT YET DEFINED       gabapentin 300 MG capsule    NEURONTIN    270 capsule    Take 1 capsule (300 mg) by mouth 3 times daily for arm  mobility.    Right flaccid hemiplegia (H), History of CVA (cerebrovascular accident)       lidocaine 5 % Patch    LIDODERM    90 patch    APPLY 1 PATCH TO PAINFUL AREA FOR UP TO 12 HOURS WITHIN A 24 HOUR PERIOD. REMOVE AFTER 12 HOURS.    Right upper limb pain, Right flaccid hemiplegia (H)       losartan-hydrochlorothiazide 100-25 MG per tablet    HYZAAR    90 tablet    TAKE 1/2 TABLET BY MOUTH DAILY AS NEEDED FOR HIGH BLOOD PRESSURE.    Essential hypertension with goal blood pressure less than 140/90       metoprolol 50 MG 24 hr tablet    TOPROL-XL    90 tablet    TAKE 1 TABLET (50 MG) BY MOUTH DAILY FOR BLOOD PRESSURE.    Essential hypertension with goal blood pressure less than 140/90       nabumetone 500 MG tablet    RELAFEN    60 tablet    TAKE 1-2 TABLETS BY MOUTH TWICE A DAY AS NEEDED FOR PAIN IN ARM OR HIP, TAKE WITH FOOD.    Right shoulder pain, unspecified chronicity, Right upper limb pain       order for DME     1 Month    Equipment being ordered: Blood pressure machine. Large Gloves. Large Adult Diapers/Depends. Disposable pad. Sheet skin bed pad. Quantity limits and brand per insurance preference.    Right flaccid hemiplegia (H), Essential hypertension, benign, History of CVA (cerebrovascular accident)       pantoprazole 40 MG EC tablet    PROTONIX    90 tablet    Take 1 tablet (40 mg) by mouth daily for reflux.    Gastroesophageal reflux disease without esophagitis       polyvinyl alcohol 1.4 % ophthalmic solution    ARTIFICIAL TEARS    6 mL    Place 1 drop into both eyes 4 times daily    Pterygium of right eye       simvastatin 20 MG tablet    ZOCOR    90 tablet    Take 1 tablet (20 mg) by mouth every evening for cholesterol.    History of CVA (cerebrovascular accident)       sulfacetamide-prednisoLONE 10-0.23 % ophthalmic solution    VASOCIDIN    5 mL    Place 1 drop into the right eye 2 times daily as needed , or as directed by Dr. Rai.    Pterygium, unspecified

## 2017-09-12 NOTE — LETTER
September 13, 2017      Anita Bennett  [address]  [place] [address]            Ms. Bennett,    All of your labs were normal for you.    Please contact the clinic if you have additional questions.  Thank you.    Sincerely,      Mulugeta Sharma MD/ak        Results for orders placed or performed in visit on 09/12/17   ALT   Result Value Ref Range    ALT 17 0 - 50 U/L   Basic metabolic panel   Result Value Ref Range    Sodium 142 133 - 144 mmol/L    Potassium 3.7 3.4 - 5.3 mmol/L    Chloride 108 94 - 109 mmol/L    Carbon Dioxide 28 20 - 32 mmol/L    Anion Gap 6 3 - 14 mmol/L    Glucose 96 70 - 99 mg/dL    Urea Nitrogen 18 7 - 30 mg/dL    Creatinine 0.89 0.52 - 1.04 mg/dL    GFR Estimate 65 >60 mL/min/1.7m2    GFR Estimate If Black 79 >60 mL/min/1.7m2    Calcium 8.8 8.5 - 10.1 mg/dL   Lipid panel reflex to direct LDL   Result Value Ref Range    Cholesterol 146 <200 mg/dL    Triglycerides 128 <150 mg/dL    HDL Cholesterol 43 (L) >49 mg/dL    LDL Cholesterol Calculated 77 <100 mg/dL    Non HDL Cholesterol 103 <130 mg/dL

## 2017-09-12 NOTE — TELEPHONE ENCOUNTER
Re: Saulo    Pharmacy said it looks like in the past patient  has taken 400mg  3 times a day    Now they have a 300mg 3 times a day ordered today    Did this get lowered?    Call to advise  brandon benton (Pharmacy) [phone]

## 2017-09-12 NOTE — TELEPHONE ENCOUNTER
I have only been aware of her taking 300 mg pills since 1/2016, including the last prescription written by me

## 2017-09-12 NOTE — PROGRESS NOTES
"  SUBJECTIVE:   Anita Bennett is a 57 year old female who presents to clinic today for the following health issues:  She is accompanied by her  and .     Hypertension Follow-up      Outpatient blood pressures are being checked at home.  Results are good. Per , home results are around 135/80    Low Salt Diet: low salt    Amount of exercise or physical activity: None    Problems taking medications regularly: No    Medication side effects: none  Diet: regular (no restrictions) and low salt    Per , she has not taken her medication today (normally at 10 am) and she is fasting.  Additionally, her  reports that she takes 1 pill of metoprolol per day, and only 1/2 tab of losartan-hydrochlorothiazide when her blood pressures are high (rarely lately).    Past medical, family, and social histories, medications, and allergies are reviewed and updated in Epic.     ROS:  C: NEGATIVE for fever, chills, change in weight  E/M: NEGATIVE for ear, mouth and throat problems  R: NEGATIVE for significant cough or SOB  CV: NEGATIVE for chest pain, palpitations or peripheral edema  ROS otherwise negative    Any history above obtained by the Medical Assistant was reviewed by Dr. Mulugeta Sharma MD, and edited when necessary.    This document serves as a record of the services and decisions personally performed and made by Dr. Sharma. It was created on his behalf by Carolin Alcaraz, a trained medical scribe. The creation of this document is based the provider's statements to the medical scribe.  Carolin Alcaraz September 12, 2017 9:31 AM     OBJECTIVE:                                                    /74 (BP Location: Left arm, Patient Position: Sitting)  Pulse 74  Temp 98.5  F (36.9  C) (Oral)  Ht 1.499 m (4' 11\")  Wt 59 kg (130 lb)  SpO2 98%  BMI 26.26 kg/m2   Body mass index is 26.26 kg/(m^2).     GENERAL: healthy, alert and no distress  EYES: Eyes grossly normal to inspection, PERRL, " EOMI, sclerae white and conjunctivae normal  RESP: lungs clear to auscultation - no crackles or wheezes, no areas of dullness, no tachypnea  CV: Heart regular rate and rhythm without murmur, click or rub. No peripheral edema and peripheral pulses strong  MS: no gross musculoskeletal defects noted, no edema  SKIN: no suspicious lesions or rashes  NEURO: RUE flaccid paralysis, RLE with a brace to counter her foot drop, sensory exam grossly normal, mentation intact and speech present, however, I cannot assess it since it is not English ( speaks for her most of the time).   PSYCH: mentation appears normal, affect normal/bright        ASSESSMENT/PLAN:                                                      (I10) Essential hypertension with goal blood pressure less than 140/90  (primary encounter diagnosis)  Comment: Well controlled.   Plan: losartan-hydrochlorothiazide (HYZAAR) 100-25 MG        per tablet, metoprolol (TOPROL-XL) 50 MG 24 hr         tablet, Basic metabolic panel        Follow up in 6 months.    (Z86.73) History of CVA (cerebrovascular accident)  Comment: Patient is fasting. Prescription updates  Plan: simvastatin (ZOCOR) 20 MG tablet, baclofen         (LIORESAL) 10 MG tablet, gabapentin (NEURONTIN)        300 MG capsule, ALT, Lipid panel reflex to         direct LDL          (G81.01) Right flaccid hemiplegia (H)  Comment: prescription update. Her post-stroke symptoms are stable on these medications.  Plan: baclofen (LIORESAL) 10 MG tablet, gabapentin         (NEURONTIN) 300 MG capsule            (Z23) Need for influenza vaccination  Comment: Influenza vaccine requested and given.   Plan: HC FLU VAC PRESRV FREE QUAD SPLIT VIR 3+YRS IM            The information in this document, created by the medical scribe for me, accurately reflects the services I personally performed and the decisions made by me. I have reviewed and approved this document for accuracy prior to leaving the patient care area.  September 12, 2017 9:31 AM   Mulugeta Sharma MD                           Injectable Influenza Immunization Documentation    1.  Are you sick today? (Fever of 100.5 or higher on the day of the clinic)   No    2.  Have you ever had Guillain-[place] Syndrome within 6 weeks of an influenza vaccionation?  No    3. Do you have a life-threatening allergy to eggs?  No    4. Do you have a life-threatening allergy to a component of the vaccine? May include antibiotics, gelatin or latex.  No     5. Have you ever had a reaction to a dose of flu vaccine that needed immediate medical attention?  No     Form completed by LOU Valentine MA

## 2017-09-13 RX ORDER — GABAPENTIN 400 MG/1
1 CAPSULE ORAL 3 TIMES DAILY
COMMUNITY
Start: 2016-10-20 | End: 2021-03-16

## 2017-09-13 NOTE — TELEPHONE ENCOUNTER
On the phone with pharmacy. Pharmacist states patient is currently taking Gabapentin 400mg and received it from another provider named Dr Jean contact phone [phone].  Pharmacy wondering whether patient is to continue the 300mg dosage or cancel and send new Rx for 400mg or have the request made with Dr Olivera?    Called the phone number and it was [facility], which was the doctor she saw at [facility] for procedure in August. Please advise.    Richard Morton CMA

## 2017-10-09 ENCOUNTER — RADIANT APPOINTMENT (OUTPATIENT)
Dept: MAMMOGRAPHY | Facility: CLINIC | Age: 57
End: 2017-10-09
Attending: FAMILY MEDICINE
Payer: MEDICARE

## 2017-10-09 DIAGNOSIS — Z12.31 VISIT FOR SCREENING MAMMOGRAM: ICD-10-CM

## 2017-10-09 PROCEDURE — T1013 SIGN LANG/ORAL INTERPRETER: HCPCS | Mod: U3

## 2017-10-09 PROCEDURE — G0202 SCR MAMMO BI INCL CAD: HCPCS | Performed by: RADIOLOGY

## 2017-11-30 ENCOUNTER — TRANSFERRED RECORDS (OUTPATIENT)
Dept: HEALTH INFORMATION MANAGEMENT | Facility: CLINIC | Age: 57
End: 2017-11-30

## 2017-12-17 DIAGNOSIS — Z86.73 PERSONAL HISTORY OF TRANSIENT CEREBRAL ISCHEMIA: ICD-10-CM

## 2017-12-17 NOTE — TELEPHONE ENCOUNTER
Requested Prescriptions   Pending Prescriptions Disp Refills     aspirin 325 MG tablet [Pharmacy Med Name: ASPIRIN 325 MG TABLET] 90 tablet 2    Last Written Prescription Date:  12/19/16  Last Fill Quantity: 90,  # refills: 3   Last Office Visit with [facility], [facility] or [facility] prescribing provider:  9/12/2017     Future Office Visit:    Next 5 appointments (look out 90 days)     Mar 12, 2018 10:00 AM CDT   Office Visit with Mulugeta Sharma MD   [facility] ([facility])    [address] [place] [id number]   [phone]                  Sig: TAKE 1 TABLET BY MOUTH DAILY    Analgesics (Non-Narcotic Tylenol and ASA Only) Passed    12/17/2017  2:12 PM       Passed - Recent or future visit with authorizing provider's specialty    Patient had office visit in the last year or has a visit in the next 30 days with authorizing provider.  See chart review.              Passed - Patient is age 20 years or older    If ASA is flagged for ages under 20 years old. Forward to provider for confirmation Ryes Syndrome is not a concern.

## 2017-12-20 RX ORDER — ASPIRIN 325 MG
TABLET ORAL
Qty: 90 TABLET | Refills: 2 | Status: SHIPPED | OUTPATIENT
Start: 2017-12-20 | End: 2018-07-05

## 2017-12-20 NOTE — TELEPHONE ENCOUNTER
Prescription approved per [facility], [facility] or [facility] refill protocol.  Danielle Dennis RN - Triage  [facility]

## 2018-01-09 ENCOUNTER — TELEPHONE (OUTPATIENT)
Dept: FAMILY MEDICINE | Facility: CLINIC | Age: 58
End: 2018-01-09

## 2018-01-09 NOTE — TELEPHONE ENCOUNTER
Plan does not cover lidocaine (LIDODERM) 5 % Patch.  Please call [phone] to initiate Prior Auth or change med.    Insurance type and ID number: ID# [id number]      Additional Information:     Alanis Martin

## 2018-01-10 NOTE — TELEPHONE ENCOUNTER
The note from Late Nov say if the patient have the following diagnosis  1. Neuropathy   2. Cancer related pain  3. Post-herpetic neuralgia.   These are the only diagnosis which insurance will cover the lidocaine (LIDODERM) 5 % Patch  Lakeisha Skaggs

## 2018-01-13 DIAGNOSIS — M79.601 RIGHT UPPER LIMB PAIN: ICD-10-CM

## 2018-01-13 DIAGNOSIS — M25.511 RIGHT SHOULDER PAIN, UNSPECIFIED CHRONICITY: ICD-10-CM

## 2018-01-14 NOTE — TELEPHONE ENCOUNTER
"Requested Prescriptions   Pending Prescriptions Disp Refills     nabumetone (RELAFEN) 500 MG tablet [Pharmacy Med Name: NABUMETONE 500 MG TABLET] 60 tablet 5    Last Written Prescription Date:  7/18/17  Last Fill Quantity: 60,  # refills: 5   Last Office Visit with [facility], [facility] or [facility] prescribing provider:  9/12/2017     Future Office Visit:    Next 5 appointments (look out 90 days)     Mar 12, 2018 10:00 AM CDT   Office Visit with Mulugeta Sharma MD   [facility] ([facility]    [address] [place] [address]   [phone]                  Sig: TAKE 1-2 TABLETS BY MOUTH TWICE A DAY AS NEEDED FOR PAIN IN ARM OR HIP, TAKE WITH FOOD.    NSAID Medications Failed    1/13/2018  7:42 PM       Failed - Normal AST on file in past 12 months    No lab results found.         Failed - Normal CBC on file in past 12 months    Recent Labs   Lab Test  10/31/16   1400   WBC  5.2   RBC  3.57*   HGB  12.1   HCT  35.4   PLT  220            Passed - Blood pressure under 140/90    BP Readings from Last 3 Encounters:   09/12/17 132/74   03/30/17 129/72   11/08/16 120/72                Passed - Normal ALT on file in past 12 months    Recent Labs   Lab Test  09/12/17   0953   ALT  17            Passed - Recent or future visit with authorizing provider's specialty    Patient had office visit in the last year or has a visit in the next 30 days with authorizing provider.  See \"Patient Info\" tab in inbasket, or \"Choose Columns\" in Meds & Orders section of the refill encounter.              Passed - Patient is age 6-64 years       Passed - No active pregnancy on record       Passed - Normal serum creatinine on file in past 12 months    Recent Labs   Lab Test  09/12/17   0953   CR  0.89            Passed - No positive pregnancy test in past 12 months          "

## 2018-01-15 NOTE — TELEPHONE ENCOUNTER
Routing refill request to provider for review/approval because:  Labs not current:      Abbie Burns RN   [facility]

## 2018-01-16 RX ORDER — NABUMETONE 500 MG/1
TABLET, FILM COATED ORAL
Qty: 60 TABLET | Refills: 5 | Status: SHIPPED | OUTPATIENT
Start: 2018-01-16 | End: 2018-05-02

## 2018-03-01 ENCOUNTER — TRANSFERRED RECORDS (OUTPATIENT)
Dept: HEALTH INFORMATION MANAGEMENT | Facility: CLINIC | Age: 58
End: 2018-03-01

## 2018-03-19 ENCOUNTER — OFFICE VISIT (OUTPATIENT)
Dept: FAMILY MEDICINE | Facility: CLINIC | Age: 58
End: 2018-03-19
Payer: MEDICARE

## 2018-03-19 VITALS
DIASTOLIC BLOOD PRESSURE: 64 MMHG | HEART RATE: 71 BPM | SYSTOLIC BLOOD PRESSURE: 112 MMHG | TEMPERATURE: 96.6 F | OXYGEN SATURATION: 94 %

## 2018-03-19 DIAGNOSIS — K21.9 GASTROESOPHAGEAL REFLUX DISEASE WITHOUT ESOPHAGITIS: ICD-10-CM

## 2018-03-19 DIAGNOSIS — I10 ESSENTIAL HYPERTENSION WITH GOAL BLOOD PRESSURE LESS THAN 140/90: Primary | ICD-10-CM

## 2018-03-19 DIAGNOSIS — G81.01 RIGHT FLACCID HEMIPLEGIA (H): ICD-10-CM

## 2018-03-19 DIAGNOSIS — Z13.6 CARDIOVASCULAR SCREENING; LDL GOAL LESS THAN 100: ICD-10-CM

## 2018-03-19 DIAGNOSIS — Z86.73 HISTORY OF CVA (CEREBROVASCULAR ACCIDENT): ICD-10-CM

## 2018-03-19 LAB
ALT SERPL W P-5'-P-CCNC: 20 U/L (ref 0–50)
CHOLEST SERPL-MCNC: 139 MG/DL
CREAT SERPL-MCNC: 0.99 MG/DL (ref 0.52–1.04)
GFR SERPL CREATININE-BSD FRML MDRD: 58 ML/MIN/1.7M2
HDLC SERPL-MCNC: 45 MG/DL
LDLC SERPL CALC-MCNC: 40 MG/DL
NONHDLC SERPL-MCNC: 94 MG/DL
POTASSIUM SERPL-SCNC: 3.8 MMOL/L (ref 3.4–5.3)
TRIGL SERPL-MCNC: 268 MG/DL

## 2018-03-19 PROCEDURE — 84460 ALANINE AMINO (ALT) (SGPT): CPT | Performed by: FAMILY MEDICINE

## 2018-03-19 PROCEDURE — 84132 ASSAY OF SERUM POTASSIUM: CPT | Performed by: FAMILY MEDICINE

## 2018-03-19 PROCEDURE — 99214 OFFICE O/P EST MOD 30 MIN: CPT | Performed by: FAMILY MEDICINE

## 2018-03-19 PROCEDURE — 82565 ASSAY OF CREATININE: CPT | Performed by: FAMILY MEDICINE

## 2018-03-19 PROCEDURE — 80061 LIPID PANEL: CPT | Performed by: FAMILY MEDICINE

## 2018-03-19 PROCEDURE — 36415 COLL VENOUS BLD VENIPUNCTURE: CPT | Performed by: FAMILY MEDICINE

## 2018-03-19 RX ORDER — SIMVASTATIN 20 MG
20 TABLET ORAL EVERY EVENING
Qty: 90 TABLET | Refills: 1 | Status: SHIPPED | OUTPATIENT
Start: 2018-03-19 | End: 2018-09-21

## 2018-03-19 RX ORDER — METOPROLOL SUCCINATE 50 MG/1
50 TABLET, EXTENDED RELEASE ORAL DAILY
Qty: 90 TABLET | Refills: 1 | Status: SHIPPED | OUTPATIENT
Start: 2018-03-19 | End: 2018-09-21

## 2018-03-19 RX ORDER — LOSARTAN POTASSIUM AND HYDROCHLOROTHIAZIDE 25; 100 MG/1; MG/1
TABLET ORAL
Qty: 90 TABLET | Refills: 0 | Status: SHIPPED | OUTPATIENT
Start: 2018-03-19 | End: 2018-12-06

## 2018-03-19 RX ORDER — ESCITALOPRAM OXALATE 10 MG/1
10 TABLET ORAL DAILY
COMMUNITY
End: 2021-03-16

## 2018-03-19 RX ORDER — BACLOFEN 10 MG/1
10 TABLET ORAL 3 TIMES DAILY PRN
Qty: 90 TABLET | Refills: 5 | Status: SHIPPED | OUTPATIENT
Start: 2018-03-19 | End: 2019-12-09 | Stop reason: DRUGHIGH

## 2018-03-19 RX ORDER — PANTOPRAZOLE SODIUM 40 MG/1
40 TABLET, DELAYED RELEASE ORAL DAILY
Qty: 90 TABLET | Refills: 3 | Status: SHIPPED | OUTPATIENT
Start: 2018-03-19 | End: 2019-03-25

## 2018-03-19 ASSESSMENT — PAIN SCALES - GENERAL: PAINLEVEL: NO PAIN (0)

## 2018-03-19 NOTE — PATIENT INSTRUCTIONS
At [facility], we strive to deliver an exceptional experience to you, every time we see you.  If you receive a survey in the mail, please send us back your thoughts. We really do value your feedback.    Based on your medical history, these are the current health maintenance/preventive care services that you are due for (some may have been done at this visit.)  Health Maintenance Due   Topic Date Due     EYE EXAM Q1 YEAR  10/24/2017         Suggested websites for health information:  Www.[employer].org : Up to date and easily searchable information on multiple topics.  Www.medlineplus.gov : medication info, interactive tutorials, watch real surgeries online  Www.familydoctor.org : good info from the Academy of Family Physicians  Www.cdc.gov : public health info, travel advisories, epidemics (H1N1)  Www.aap.org : children's health info, normal development, vaccinations  Www.health.[facility].[place].us : [place] dept of health, public health issues in [place], N1N1    Your care team:                            Family Medicine Internal Medicine   MD Sánchez Miller MD Shantel Branch-Fleming, MD Katya Georgiev PA-C Nam Ho, MD Pediatrics   YOANDY Dockery, CNP Marleny Umaña APRN CNP   MD Radha Del Cid MD Deborah Mielke, MD Kim Thein, APRN CNP      Clinic hours: Monday - Thursday 7 am-7 pm; Fridays 7 am-5 pm.   Urgent care: Monday - Friday 11 am-9 pm; Saturday and Sunday 9 am-5 pm.  Pharmacy : Monday -Thursday 8 am-8 pm; Friday 8 am-6 pm; Saturday and Sunday 9 am-5 pm.     Clinic: [phone]   Pharmacy: [phone]

## 2018-03-19 NOTE — MR AVS SNAPSHOT
After Visit Summary   3/19/2018    Anita Bennett    MRN: [id number]           Patient Information     Date Of Birth          1960        Visit Information        Provider Department      3/19/2018 1:25 PM Mulugeta Sharma MD; [facility] [employer] [facility]        Today's Diagnoses     Essential hypertension with goal blood pressure less than 140/90    -  1    Right flaccid hemiplegia (H)        History of CVA (cerebrovascular accident)        CARDIOVASCULAR SCREENING; LDL GOAL LESS THAN 100        Gastroesophageal reflux disease without esophagitis          Care Instructions    At [facility], we strive to deliver an exceptional experience to you, every time we see you.  If you receive a survey in the mail, please send us back your thoughts. We really do value your feedback.    Based on your medical history, these are the current health maintenance/preventive care services that you are due for (some may have been done at this visit.)  Health Maintenance Due   Topic Date Due     EYE EXAM Q1 YEAR  10/24/2017         Suggested websites for health information:  Www.[employer].com.org : Up to date and easily searchable information on multiple topics.  Www.medlineplus.gov : medication info, interactive tutorials, watch real surgeries online  Www.familydoctor.org : good info from the Academy of Family Physicians  Www.cdc.gov : public health info, travel advisories, epidemics (H1N1)  Www.aap.org : children's health info, normal development, vaccinations  Www.health.state[place]us : [place] dept of health, public health issues in [place], N1N1    Your care team:                            Family Medicine Internal Medicine   MD Sánchez Miller MD Shantel Branch-Fleming, MD Katya Georgiev PA-C Nam Ho, MD Pediatrics   YOANDY Dockery, MD Radha Fuentes CNP, MD Deborah Mielke, MD Kim Thein, ELKIN  "CNP      Clinic hours: Monday - Thursday 7 am-7 pm;  7 am-5 pm.   Urgent care: Monday - Friday 11 am-9 pm; Saturday and  9 am-5 pm.  Pharmacy : Monday -Thursday 8 am-8 pm; Friday 8 am-6 pm; Saturday and  9 am-5 pm.     Clinic: [phone]   Pharmacy: [phone]            Follow-ups after your visit        Follow-up notes from your care team     Return in about 6 months (around 2018) for full physical, blood pressure check, cholesterol.      Who to contact     If you have questions or need follow up information about today's clinic visit or your schedule please contact [facility] directly at [phone].  Normal or non-critical lab and imaging results will be communicated to you by MyChart, letter or phone within 4 business days after the clinic has received the results. If you do not hear from us within 7 days, please contact the clinic through MyChart or phone. If you have a critical or abnormal lab result, we will notify you by phone as soon as possible.  Submit refill requests through [facility] or call your pharmacy and they will forward the refill request to us. Please allow 3 business days for your refill to be completed.          Additional Information About Your Visit        MyChart Information     [facility] lets you send messages to your doctor, view your test results, renew your prescriptions, schedule appointments and more. To sign up, go to [email or web address] . Click on \"Log in\" on the left side of the screen, which will take you to the Welcome page. Then click on \"Sign up Now\" on the right side of the page.     You will be asked to enter the access code listed below, as well as some personal information. Please follow the directions to create your username and password.     Your access code is: [email or web address]  Expires: 2018  2:31 PM     Your access code will  in 90 days. If you need help or a new code, please call your [facility] or " [phone].        Care EveryWhere ID     This is your Care EveryWhere ID. This could be used by other organizations to access your [place] medical records  [id number]        Your Vitals Were     Pulse Temperature Pulse Oximetry             71 96.6  F (35.9  C) (Oral) 94%          Blood Pressure from Last 3 Encounters:   03/19/18 112/64   09/12/17 132/74   03/30/17 129/72    Weight from Last 3 Encounters:   09/12/17 130 lb (59 kg)   11/08/16 130 lb (59 kg)   02/08/16 130 lb (59 kg)              We Performed the Following     ALT     Creatinine     Lipid panel reflex to direct LDL Non-fasting     Potassium          Today's Medication Changes          These changes are accurate as of 3/19/18  2:31 PM.  If you have any questions, ask your nurse or doctor.               Start taking these medicines.        Dose/Directions    diclofenac 1.3 % Patch   Commonly known as:  FLECTOR   Used for:  Right flaccid hemiplegia (H)   Started by:  Mulugeta Sharma MD        Dose:  3 patch   Place 3 patches onto the skin daily as needed for moderate pain   Quantity:  90 patch   Refills:  5         These medicines have changed or have updated prescriptions.        Dose/Directions    losartan-hydrochlorothiazide 100-25 MG per tablet   Commonly known as:  HYZAAR   This may have changed:  additional instructions   Used for:  Essential hypertension with goal blood pressure less than 140/90   Changed by:  Mulugeta Sharma MD        Take 1/2 tablet daily as needed for high blood pressure.   Quantity:  90 tablet   Refills:  0       metoprolol succinate 50 MG 24 hr tablet   Commonly known as:  TOPROL-XL   This may have changed:    - how much to take  - how to take this  - when to take this  - additional instructions   Used for:  Essential hypertension with goal blood pressure less than 140/90   Changed by:  Mulugeta Sharma MD        Dose:  50 mg   Take 1 tablet (50 mg) by mouth daily for blood pressure.   Quantity:  90 tablet    Refills:  1         Stop taking these medicines if you haven't already. Please contact your care team if you have questions.     lidocaine 5 % Patch   Commonly known as:  LIDODERM   Stopped by:  Mulugeta Sharma MD                Where to get your medicines      These medications were sent to [facility]/pharmacy #[id number] - [facility], [place] - [address]  [address] [address]     Phone:  [phone]     baclofen 10 MG tablet    diclofenac 1.3 % Patch    losartan-hydrochlorothiazide 100-25 MG per tablet    metoprolol succinate 50 MG 24 hr tablet    pantoprazole 40 MG EC tablet    simvastatin 20 MG tablet                Primary Care Provider Office Phone # Fax #    Mulugeta Sharma [phone]       [address]  [facility] [address]        Equal Access to Services     : Hadii aad ku hadasho Soomaali, waaxda luqadaha, qaybta kaalmada adeegyada, waxay idiin hayaan adebear kharastewart knight . So [facility] [phone].    ATENCIÓN: Si habla español, tiene a lucero disposición servicios gratuitos de asistencia lingüística. [facility] [phone].    We comply with applicable federal civil rights laws and [place] laws. We do not discriminate on the basis of race, color, national origin, age, disability, sex, sexual orientation, or gender identity.            Thank you!     Thank you for choosing [facility]  for your care. Our goal is always to provide you with excellent care. Hearing back from our patients is one way we can continue to improve our services. Please take a few minutes to complete the written survey that you may receive in the mail after your visit with us. Thank you!             Your Updated Medication List - Protect others around you: Learn how to safely use, store and throw away your medicines at www.disposemymeds.org.          This list is accurate as of 3/19/18  2:31 PM.  Always use your most recent med list.                   Brand  Name Dispense Instructions for use Diagnosis    aspirin 325 MG tablet     90 tablet    TAKE 1 TABLET BY MOUTH DAILY    Personal history of transient cerebral ischemia       azelastine 0.05 % Soln ophthalmic solution    OPTIVAR    1 Bottle    Apply 1 drop to eye 2 times daily    Allergic conjunctivitis, bilateral       baclofen 10 MG tablet    LIORESAL    90 tablet    Take 1 tablet (10 mg) by mouth 3 times daily as needed for muscle spasms (arm pain)    Right flaccid hemiplegia (H), History of CVA (cerebrovascular accident)       [employer] SENNA PLUS 8.6-50 MG per tablet   Generic drug:  senna-docusate     30 tablet    TAKE 1 TABLET BY MOUTH DAILY AS NEEDED FOR CONSTIPATION    DIAGNOSIS NOT YET DEFINED       diclofenac 1.3 % Patch    FLECTOR    90 patch    Place 3 patches onto the skin daily as needed for moderate pain    Right flaccid hemiplegia (H)       gabapentin 400 MG capsule    NEURONTIN     1 capsule 3 times daily for arm mobility.        LEXAPRO 10 MG tablet   Generic drug:  escitalopram      Take 10 mg by mouth daily        losartan-hydrochlorothiazide 100-25 MG per tablet    HYZAAR    90 tablet    Take 1/2 tablet daily as needed for high blood pressure.    Essential hypertension with goal blood pressure less than 140/90       metoprolol succinate 50 MG 24 hr tablet    TOPROL-XL    90 tablet    Take 1 tablet (50 mg) by mouth daily for blood pressure.    Essential hypertension with goal blood pressure less than 140/90       nabumetone 500 MG tablet    RELAFEN    60 tablet    TAKE 1-2 TABLETS BY MOUTH TWICE A DAY AS NEEDED FOR PAIN IN ARM OR HIP, TAKE WITH FOOD.    Right shoulder pain, unspecified chronicity, Right upper limb pain       order for DME     1 Month    Equipment being ordered: Blood pressure machine. Large Gloves. Large Adult Diapers/Depends. Disposable pad. Sheet skin bed pad. Quantity limits and brand per insurance preference.    Right flaccid hemiplegia (H), Essential hypertension, benign, History  of CVA (cerebrovascular accident)       pantoprazole 40 MG EC tablet    PROTONIX    90 tablet    Take 1 tablet (40 mg) by mouth daily for reflux.    Gastroesophageal reflux disease without esophagitis       simvastatin 20 MG tablet    ZOCOR    90 tablet    Take 1 tablet (20 mg) by mouth every evening for cholesterol.    History of CVA (cerebrovascular accident), CARDIOVASCULAR SCREENING; LDL GOAL LESS THAN 100

## 2018-03-19 NOTE — LETTER
[address] [address]  Phone: [phone]   April 4, 2018      Anita Bennett  [address] [place] [address]            Dear Anita Bennett    All of your test results were within the expected range for you (non-fasting).     Please contact the clinic if you have additional questions.  Thank you.      Enclosed is a copy of the results.  It was a pleasure to see you at your last appointment.    If you have any questions or concerns, please call myself or my nurse at [phone].      Sincerely,      Mulugeta Sharma MD/JEANNIE Rizo MA      Results for orders placed or performed in visit on 03/19/18   ALT   Result Value Ref Range    ALT 20 0 - 50 U/L   Creatinine   Result Value Ref Range    Creatinine 0.99 0.52 - 1.04 mg/dL    GFR Estimate 58 (L) >60 mL/min/1.7m2    GFR Estimate If Black 70 >60 mL/min/1.7m2   Potassium   Result Value Ref Range    Potassium 3.8 3.4 - 5.3 mmol/L   Lipid panel reflex to direct LDL Non-fasting   Result Value Ref Range    Cholesterol 139 <200 mg/dL    Triglycerides 268 (H) <150 mg/dL    HDL Cholesterol 45 (L) >49 mg/dL    LDL Cholesterol Calculated 40 <100 mg/dL    Non HDL Cholesterol 94 <130 mg/dL

## 2018-03-19 NOTE — PROGRESS NOTES
SUBJECTIVE:   Anita Bennett is a 57 year old female who presents to clinic today for the following health issues:  She is accompanied by her  and .     Hypertension Follow-up      Outpatient blood pressures are being checked at home.  Results are 130/90 or lower per spouse.    Low Salt Diet: low salt      Amount of exercise or physical activity: 2-7 days per weeks.    Problems taking medications regularly: No    Medication side effects: none    Diet: low salt      Lidocaine patches:  Insurance no longer covers these, and the patient and her  wonder if there is another medication that could work. The patient uses the patches on her lower back and her right arm.     Past medical, family, and social histories, medications, and allergies are reviewed and updated in [facility].     ROS:  CONSTITUTIONAL: NEGATIVE for fever, chills, change in weight  ENT/MOUTH: NEGATIVE for ear, mouth and throat problems  RESP: NEGATIVE for significant cough or SOB  CV: NEGATIVE for chest pain, palpitations or peripheral edema  ROS otherwise negative    This document serves as a record of the services and decisions personally performed and made by Dr. Sharma. It was created on his behalf by Carolin Alcaraz, a trained medical scribe. The creation of this document is based the provider's statements to the medical scribe.  Carolin Alcaraz March 19, 2018 2:20 PM     OBJECTIVE:                                                    /64 (BP Location: Left arm, Patient Position: Chair, Cuff Size: Adult Large)  Pulse 71  Temp 96.6  F (35.9  C) (Oral)  SpO2 94%   There is no height or weight on file to calculate BMI.     GENERAL: healthy, alert and no distress  EYES: Eyes grossly normal to inspection, PERRL, EOMI, sclerae white and conjunctivae normal  RESP: lungs clear to auscultation - no crackles or wheezes, no areas of dullness, no tachypnea  CV: Heart regular rate and rhythm without murmur, click or rub. No peripheral  edema and peripheral pulses strong  MS: no gross musculoskeletal defects noted, no edema. Sitting in a wheelchair.  SKIN: no suspicious lesions or rashes  NEURO: RUE flaccid paralysis, RLE with a brace to counter her foot drop, sensory exam grossly normal, mentation intact and speech present, however, I cannot assess it since it is not English ( speaks for her most of the time).   PSYCH: mentation appears normal, affect normal/bright     Diagnostic Test Results:  none      ASSESSMENT/PLAN:                                                      (I10) Essential hypertension with goal blood pressure less than 140/90  (primary encounter diagnosis)  Comment: Well controlled.  Plan: losartan-hydrochlorothiazide (HYZAAR) 100-25 MG        per tablet, metoprolol succinate (TOPROL-XL) 50        MG 24 hr tablet, Creatinine, Potassium        Follow up in 6 months at [facility].    (G81.01) Right flaccid hemiplegia (H)  Comment: Her post-stroke symptoms are stable. Since insurance does not cover lidocaine patches anymore, we will try diclofenac patches. They are prescribed 3 per day, as she has 3 locations she would normally apply the lidocaine patches. I think she develops muscle pain here as a result of abnormal posture and muscle tone from her flaccid hemiplegia (right arm, right thoracic paraspinous, right lumbar paraspinous).  Plan: baclofen (LIORESAL) 10 MG tablet, diclofenac         (FLECTOR) 1.3 % Patch        Follow up in 6 months.     (Z86.73) History of CVA (cerebrovascular accident)  Comment: prescription and lab updates.  Plan: baclofen (LIORESAL) 10 MG tablet, simvastatin         (ZOCOR) 20 MG tablet, ALT, Lipid panel reflex         to direct LDL Non-fasting        Follow up in 6 months.     (K21.9) Gastroesophageal reflux disease without esophagitis  Comment: prescription update   Plan: pantoprazole (PROTONIX) 40 MG EC tablet          (Z13.6) CARDIOVASCULAR SCREENING; LDL GOAL LESS THAN 100  Comment: historically at  goal.  Plan: simvastatin (ZOCOR) 20 MG tablet, ALT, Lipid         panel reflex to direct LDL Non-fasting            The information in this document, created by the medical scribe for me, accurately reflects the services I personally performed and the decisions made by me. I have reviewed and approved this document for accuracy prior to leaving the patient care area. March 19, 2018 2:20 PM   Mulugeta Sharma MD

## 2018-03-20 ENCOUNTER — TELEPHONE (OUTPATIENT)
Dept: FAMILY MEDICINE | Facility: CLINIC | Age: 58
End: 2018-03-20

## 2018-03-20 NOTE — TELEPHONE ENCOUNTER
Plan does not cover diclofenac (FLECTOR) 1.3 % Patch.  Please call [phone] to initiate Prior Auth or change med.    Insurance type and ID number: ID# [id number]      Additional Information:     Alanis Martin

## 2018-03-28 DIAGNOSIS — Z53.9 DIAGNOSIS NOT YET DEFINED: ICD-10-CM

## 2018-03-28 NOTE — TELEPHONE ENCOUNTER
"Requested Prescriptions   Pending Prescriptions Disp Refills     Sennosides-Docusate Sodium (SENNA-DOCUSATE SODIUM) 8.6-50 MG TABS [Pharmacy Med Name: DOCUSATE SODIUM-SENNA TABLET]    Last Written Prescription Date:  5/18/17  Last Fill Quantity: 30,  # refills: 11   Last Office Visit with [facility] or [facility] prescribing provider:  3/19/18   Future Office Visit:      30 tablet 5     Sig: TAKE 1 TABLET BY MOUTH DAILY AS NEEDED FOR CONSTIPATION    Laxatives Protocol Passed    3/28/2018  1:34 PM       Passed - Patient is age 6 or older       Passed - Recent (12 mo) or future (30 days) visit within the authorizing provider's specialty    Patient had office visit in the last 12 months or has a visit in the next 30 days with authorizing provider or within the authorizing provider's specialty.  See \"Patient Info\" tab in inbasket, or \"Choose Columns\" in Meds & Orders section of the refill encounter.                  Lawson Faarax  [facility]  "

## 2018-03-29 RX ORDER — SENNA AND DOCUSATE SODIUM 50; 8.6 MG/1; MG/1
TABLET, FILM COATED ORAL
Qty: 30 TABLET | Refills: 5 | Status: SHIPPED | OUTPATIENT
Start: 2018-03-29 | End: 2018-07-11

## 2018-03-29 NOTE — TELEPHONE ENCOUNTER
Prescription approved per [facility] Refill Protocol.    Emily Schrader RN

## 2018-04-05 NOTE — TELEPHONE ENCOUNTER
Patient  was notified regarding MSG below.  He restated the msg denied having any questions and stated clarification at the end of the conversation.  Lakeisha Skaggs

## 2018-04-05 NOTE — TELEPHONE ENCOUNTER
PA. I already tried changing med. Doesn't seem insurance will cover any topical med.  Pt may need to request something from her specialists at [facility].

## 2018-04-24 NOTE — NURSING NOTE
"Chief Complaint   Patient presents with     Hypertension       Initial /81 (BP Location: Left arm, Patient Position: Chair, Cuff Size: Adult Large)  Pulse 74  Temp 98.5  F (36.9  C) (Oral)  Ht 4' 11\" (1.499 m)  Wt 130 lb (59 kg)  SpO2 98%  BMI 26.26 kg/m2 Estimated body mass index is 26.26 kg/(m^2) as calculated from the following:    Height as of this encounter: 4' 11\" (1.499 m).    Weight as of this encounter: 130 lb (59 kg).  Medication Reconciliation: swapna Valentine MA      " Consent: The patient's consent was obtained including but not limited to risks of crusting, scabbing, blistering, scarring, darker or lighter pigmentary change, recurrence, incomplete removal and infection. Render Post-Care Instructions In Note?: yes Detail Level: Detailed Duration Of Freeze Thaw-Cycle (Seconds): 0 Post-Care Instructions: I reviewed with the patient in detail post-care instructions. Patient is to wear sunprotection, and avoid picking at any of the treated lesions. Pt may apply Vaseline to crusted or scabbing areas. Add 52 Modifier (Optional): no Medical Necessity Clause: This procedure was medically necessary because the lesions that were treated were: Medical Necessity Information: It is in your best interest to select a reason for this procedure from the list below. All of these items fulfill various CMS LCD requirements except the new and changing color options.

## 2018-05-02 DIAGNOSIS — M79.601 RIGHT UPPER LIMB PAIN: ICD-10-CM

## 2018-05-02 DIAGNOSIS — M25.511 RIGHT SHOULDER PAIN, UNSPECIFIED CHRONICITY: ICD-10-CM

## 2018-05-02 NOTE — TELEPHONE ENCOUNTER
"Requested Prescriptions   Pending Prescriptions Disp Refills     nabumetone (RELAFEN) 500 MG tablet [Pharmacy Med Name: NABUMETONE 500 MG TABLET]  Last Written Prescription Date:  01/16/18  Last Fill Quantity: 60,  # refills: 5   Last Office Visit with G, P or [facility] prescribing provider:  03/19/18   Future Office Visit:    60 tablet 5     Sig: TAKE 1-2 TABLETS BY MOUTH TWICE A DAY AS NEEDED FOR PAIN IN ARM OR HIP, TAKE WITH FOOD.    NSAID Medications Failed    5/2/2018 10:21 AM       Failed - Normal AST on file in past 12 months    No lab results found.         Failed - Normal CBC on file in past 12 months    Recent Labs   Lab Test  10/31/16   1400   WBC  5.2   RBC  3.57*   HGB  12.1   HCT  35.4   PLT  220            Passed - Blood pressure under 140/90 in past 12 months    BP Readings from Last 3 Encounters:   03/19/18 112/64   09/12/17 132/74   03/30/17 129/72                Passed - Normal ALT on file in past 12 months    Recent Labs   Lab Test  03/19/18   1436   ALT  20            Passed - Recent (12 mo) or future (30 days) visit within the authorizing provider's specialty    Patient had office visit in the last 12 months or has a visit in the next 30 days with authorizing provider or within the authorizing provider's specialty.  See \"Patient Info\" tab in inbasket, or \"Choose Columns\" in Meds & Orders section of the refill encounter.           Passed - Patient is age 6-64 years       Passed - No active pregnancy on record       Passed - Normal serum creatinine on file in past 12 months    Recent Labs   Lab Test  03/19/18   1436   CR  0.99            Passed - No positive pregnancy test in past 12 months          "

## 2018-05-04 NOTE — TELEPHONE ENCOUNTER
Routing refill request to provider for review/approval because:  Labs not current:  AST, CBC    Eliu Singer RN, BSN

## 2018-05-07 RX ORDER — NABUMETONE 500 MG/1
TABLET, FILM COATED ORAL
Qty: 60 TABLET | Refills: 5 | Status: SHIPPED | OUTPATIENT
Start: 2018-05-07 | End: 2018-08-16

## 2018-05-23 ENCOUNTER — TELEPHONE (OUTPATIENT)
Dept: OPTOMETRY | Facility: CLINIC | Age: 58
End: 2018-05-23

## 2018-05-23 NOTE — TELEPHONE ENCOUNTER
Reason for Call:  Other prescription    Detailed comments: azelastine (OPTIVAR) 0.05 % SOLN ophthalmic solution,     Phone Number Patient can be reached at: Other phone number:    [facility] (Pharmacy) [phone]         Best Time: ANY    Can we leave a detailed message on this number? YES    Call taken on 5/23/2018 at 1:28 PM by Leigh Manzanares

## 2018-05-23 NOTE — TELEPHONE ENCOUNTER
Pharmacy called- refill declined it has been over 1 year since last visit.  Patient needs an apppointment.    James Jo OD

## 2018-06-07 ENCOUNTER — TRANSFERRED RECORDS (OUTPATIENT)
Dept: HEALTH INFORMATION MANAGEMENT | Facility: CLINIC | Age: 58
End: 2018-06-07

## 2018-07-03 DIAGNOSIS — Z53.9 DIAGNOSIS NOT YET DEFINED: ICD-10-CM

## 2018-07-03 DIAGNOSIS — Z86.73 PERSONAL HISTORY OF TRANSIENT CEREBRAL ISCHEMIA: ICD-10-CM

## 2018-07-03 RX ORDER — DOCUSATE SODIUM 50MG AND SENNOSIDES 8.6MG 8.6; 5 MG/1; MG/1
TABLET, FILM COATED ORAL
Qty: 30 TABLET | Refills: 0 | OUTPATIENT
Start: 2018-07-03

## 2018-07-03 NOTE — TELEPHONE ENCOUNTER
"Requested Prescriptions   Pending Prescriptions Disp Refills     aspirin 325 MG tablet [Pharmacy Med Name: ASPIRIN 325 MG TABLET] 90 tablet 1     Sig: TAKE 1 TABLET BY MOUTH DAILY    Analgesics (Non-Narcotic Tylenol and ASA Only) Passed    7/3/2018  1:34 PM       Passed - Recent (12 mo) or future (30 days) visit within the authorizing provider's specialty    Patient had office visit in the last 12 months or has a visit in the next 30 days with authorizing provider or within the authorizing provider's specialty.  See \"Patient Info\" tab in inbasket, or \"Choose Columns\" in Meds & Orders section of the refill encounter.           Passed - Patient is age 20 years or older    If ASA is flagged for ages under 20 years old. Forward to provider for confirmation Ryes Syndrome is not a concern.              Last Written Prescription Date:  12/20/2017  Last Fill Quantity: 90,  # refills: 2   Last office visit: 3/19/2018 with prescribing provider:  Edith   Future Office Visit:   Next 5 appointments (look out 90 days)     Sep 21, 2018 11:40 AM CDT   Office Visit with Mulugeta Sharma MD   [facility]    [address] [place] [id number] [phone]                   "

## 2018-07-03 NOTE — TELEPHONE ENCOUNTER
"Requested Prescriptions   Pending Prescriptions Disp Refills     SENEXON-S 8.6-50 MG per tablet [Pharmacy Med Name: SENEXON-S TABLET] 30 tablet 0     Sig: TAKE 1 TABLET BY MOUTH DAILY AS NEEDED FOR CONSTIPATION    Laxatives Protocol Passed    7/3/2018  3:58 PM       Passed - Patient is age 6 or older       Passed - Recent (12 mo) or future (30 days) visit within the authorizing provider's specialty    Patient had office visit in the last 12 months or has a visit in the next 30 days with authorizing provider or within the authorizing provider's specialty.  See \"Patient Info\" tab in inbasket, or \"Choose Columns\" in Meds & Orders section of the refill encounter.            Too soon to refill. Patient should have enough to last until 9/28/18    Eliu Singer RN, BSN    "

## 2018-07-05 DIAGNOSIS — Z86.73 PERSONAL HISTORY OF TRANSIENT CEREBRAL ISCHEMIA: ICD-10-CM

## 2018-07-05 RX ORDER — ASPIRIN 325 MG
TABLET ORAL
Qty: 100 TABLET | Refills: 1 | Status: SHIPPED | OUTPATIENT
Start: 2018-07-05 | End: 2019-01-02

## 2018-07-05 RX ORDER — ASPIRIN 325 MG
TABLET ORAL
Qty: 90 TABLET | Refills: 1
Start: 2018-07-05

## 2018-07-05 NOTE — TELEPHONE ENCOUNTER
"Requested Prescriptions   Pending Prescriptions Disp Refills     aspirin 325 MG tablet [Pharmacy Med Name: ASPIRIN 325 MG TABLET]    Last Written Prescription Date:  12/20/17  Last Fill Quantity: 90,  # refills:    Last Office Visit with G, P or [facility] prescribing provider:  3/19/18   Future Office Visit:    Next 5 appointments (look out 90 days)     Sep 21, 2018 11:40 AM CDT   Office Visit with Mulugeta Sharma MD   [facility]    [address] [place] [id number]   [phone]                  90 tablet 1     Sig: TAKE 1 TABLET BY MOUTH DAILY    Analgesics (Non-Narcotic Tylenol and ASA Only) Passed    7/5/2018 11:01 AM       Passed - Recent (12 mo) or future (30 days) visit within the authorizing provider's specialty    Patient had office visit in the last 12 months or has a visit in the next 30 days with authorizing provider or within the authorizing provider's specialty.  See \"Patient Info\" tab in inbasket, or \"Choose Columns\" in Meds & Orders section of the refill encounter.           Passed - Patient is age 20 years or older    If ASA is flagged for ages under 20 years old. Forward to provider for confirmation Ryes Syndrome is not a concern.                    Lawson Faarax  [facility]  "

## 2018-07-05 NOTE — TELEPHONE ENCOUNTER
"Provider, note from pharmacy requesting refill of medication:  \"PATIENT HAS MEDICAID AND PER MEDICAID WE HAVE TO FILL WHOLE BOTTLE (100 TABS) AND NOT LOOSE TABLETS. RX HAS 70 TABLETS LEFT BECAUSE IT WASNT WRITTEN  TAB ORIGINALLY. NEED MORE REFILLS\"    Provider to review and advise on \"early\" fill due to explanation above and amount of refills. Medication pended with 100 entered in for dispense amount, please advise on this and amount of refills.    Luz Marina Avila RN    "

## 2018-07-07 DIAGNOSIS — Z53.9 DIAGNOSIS NOT YET DEFINED: ICD-10-CM

## 2018-07-07 NOTE — TELEPHONE ENCOUNTER
"Requested Prescriptions   Pending Prescriptions Disp Refills     SENEXON-S 8.6-50 MG per tablet [Pharmacy Med Name: SENEXON-S TABLET]    Last Written Prescription Date:  3/29/18  Last Fill Quantity: 30,  # refills: 5   Last Office Visit with [facility], [facility] or [facility] prescribing provider:  3/19/18   Future Office Visit:    Next 5 appointments (look out 90 days)     Sep 21, 2018 11:40 AM CDT   Office Visit with Mulugeta Sharma MD   [facility]    [address] [place] [id number]   [phone]                  30 tablet 0     Sig: TAKE 1 TABLET BY MOUTH DAILY AS NEEDED FOR CONSTIPATION    Laxatives Protocol Passed    7/7/2018  2:05 PM       Passed - Patient is age 6 or older       Passed - Recent (12 mo) or future (30 days) visit within the authorizing provider's specialty    Patient had office visit in the last 12 months or has a visit in the next 30 days with authorizing provider or within the authorizing provider's specialty.  See \"Patient Info\" tab in inbasket, or \"Choose Columns\" in Meds & Orders section of the refill encounter.                  Lawson Faarax  [facility]  "

## 2018-07-10 RX ORDER — DOCUSATE SODIUM 50MG AND SENNOSIDES 8.6MG 8.6; 5 MG/1; MG/1
TABLET, FILM COATED ORAL
Qty: 30 TABLET | Refills: 0
Start: 2018-07-10

## 2018-07-10 NOTE — TELEPHONE ENCOUNTER
30 day supply with 5 refills sent on 3/29/2018. Should have refills on file at pharmacy. Too soon to refill.    Eliu Singer RN, BSN

## 2018-07-11 DIAGNOSIS — Z53.9 DIAGNOSIS NOT YET DEFINED: ICD-10-CM

## 2018-07-11 NOTE — TELEPHONE ENCOUNTER
"Requested Prescriptions   Pending Prescriptions Disp Refills     SENEXON-S 8.6-50 MG per tablet [Pharmacy Med Name: SENEXON-S TABLET]  Last Written Prescription Date:  03/29/18  Last Fill Quantity: 30,  # refills: 5   Last Office Visit with [facility], [facility] or [facility] prescribing provider:  03/19/18   Future Office Visit:    Next 5 appointments (look out 90 days)     Sep 21, 2018 11:40 AM CDT   Office Visit with [facility]   [facility]    [address] [place] [id number]   [phone]                30 tablet 0     Sig: TAKE 1 TABLET BY MOUTH DAILY AS NEEDED FOR CONSTIPATION    Laxatives Protocol Passed    7/11/2018  4:17 AM       Passed - Patient is age 6 or older       Passed - Recent (12 mo) or future (30 days) visit within the authorizing provider's specialty    Patient had office visit in the last 12 months or has a visit in the next 30 days with authorizing provider or within the authorizing provider's specialty.  See \"Patient Info\" tab in inbasket, or \"Choose Columns\" in Meds & Orders section of the refill encounter.              "

## 2018-07-12 RX ORDER — DOCUSATE SODIUM 50MG AND SENNOSIDES 8.6MG 8.6; 5 MG/1; MG/1
TABLET, FILM COATED ORAL
Qty: 30 TABLET | Refills: 2 | Status: SHIPPED | OUTPATIENT
Start: 2018-07-12 | End: 2018-10-06

## 2018-07-12 NOTE — TELEPHONE ENCOUNTER
Prescription approved per [facility] Refill Protocol.    Emily Schrader RN

## 2018-07-13 DIAGNOSIS — H10.13 ALLERGIC CONJUNCTIVITIS, BILATERAL: ICD-10-CM

## 2018-07-13 NOTE — TELEPHONE ENCOUNTER
"Requested Prescriptions   Pending Prescriptions Disp Refills     azelastine (OPTIVAR) 0.05 % SOLN ophthalmic solution  Last Written Prescription Date:  07/10/17  Last Fill Quantity: 1,  # refills: 6   Last Office Visit with G, P or [facility] prescribing provider:  03/19/18   Future Office Visit:    Next 5 appointments (look out 90 days)     Sep 21, 2018 11:40 AM CDT   Office Visit with Mulugeta Sharma MD   [facility]    [address] [place] [phone]   [phone]                1 Bottle 6     Sig: Apply 1 drop to eye 2 times daily    Miscellaneous Opthalmic Allergy Drops Protocol Passed    7/13/2018  9:31 AM       Passed - Patient is age 4 or older       Passed - Recent (12 mo) or future (30 days) visit within the authorizing provider's specialty    Patient had office visit in the last 12 months or has a visit in the next 30 days with authorizing provider or within the authorizing provider's specialty.  See \"Patient Info\" tab in inbasket, or \"Choose Columns\" in Meds & Orders section of the refill encounter.           Passed - Patient is not pregnant       Passed - No positive pregnancy test on record in past 12 mos          "

## 2018-07-17 RX ORDER — AZELASTINE HYDROCHLORIDE 0.5 MG/ML
1 SOLUTION/ DROPS OPHTHALMIC 2 TIMES DAILY
Qty: 1 BOTTLE | Refills: 2 | Status: SHIPPED | OUTPATIENT
Start: 2018-07-17 | End: 2018-10-02

## 2018-07-17 NOTE — TELEPHONE ENCOUNTER
Prescription approved per [facility] Refill Protocol.    Luz Marina Avila RN

## 2018-08-16 DIAGNOSIS — M79.601 RIGHT UPPER LIMB PAIN: ICD-10-CM

## 2018-08-16 DIAGNOSIS — M25.511 RIGHT SHOULDER PAIN, UNSPECIFIED CHRONICITY: ICD-10-CM

## 2018-08-16 NOTE — TELEPHONE ENCOUNTER
"Requested Prescriptions   Pending Prescriptions Disp Refills     nabumetone (RELAFEN) 500 MG tablet [Pharmacy Med Name: NABUMETONE 500 MG TABLET]  Last Written Prescription Date:  05/07/18  Last Fill Quantity: 60,  # refills: 5   Last Office Visit with G, P or [facility] prescribing provider:  03/19/18   Future Office Visit:    Next 5 appointments (look out 90 days)     Sep 21, 2018 11:40 AM CDT   Office Visit with Mulugeta Sharma MD   [facility]    [address] [place] [id number] [phone]                60 tablet 5     Sig: TAKE 1-2 TABLETS BY MOUTH TWICE A DAY AS NEEDED FOR PAIN IN ARM OR HIP, TAKE WITH FOOD.    NSAID Medications Failed    8/16/2018 11:57 AM       Failed - Normal AST on file in past 12 months    No lab results found.         Failed - Normal CBC on file in past 12 months    Recent Labs   Lab Test  10/31/16   1400   WBC  5.2   RBC  3.57*   HGB  12.1   HCT  35.4   PLT  220       For [facility] ONLY: [id number] = WBC, [id number] = RBC         Passed - Blood pressure under 140/90 in past 12 months    BP Readings from Last 3 Encounters:   03/19/18 112/64   09/12/17 132/74   03/30/17 129/72                Passed - Normal ALT on file in past 12 months    Recent Labs   Lab Test  03/19/18   1436   ALT  20            Passed - Recent (12 mo) or future (30 days) visit within the authorizing provider's specialty    Patient had office visit in the last 12 months or has a visit in the next 30 days with authorizing provider or within the authorizing provider's specialty.  See \"Patient Info\" tab in inbasket, or \"Choose Columns\" in Meds & Orders section of the refill encounter.           Passed - Patient is age 6-64 years       Passed - No active pregnancy on record       Passed - Normal serum creatinine on file in past 12 months    Recent Labs   Lab Test  03/19/18   1436   CR  0.99            Passed - No positive pregnancy test in past 12 " months

## 2018-08-23 RX ORDER — NABUMETONE 500 MG/1
TABLET, FILM COATED ORAL
Qty: 60 TABLET | Refills: 5 | Status: SHIPPED | OUTPATIENT
Start: 2018-08-23 | End: 2018-11-30

## 2018-09-21 ENCOUNTER — OFFICE VISIT (OUTPATIENT)
Dept: FAMILY MEDICINE | Facility: CLINIC | Age: 58
End: 2018-09-21
Payer: MEDICARE

## 2018-09-21 VITALS
HEIGHT: 59 IN | BODY MASS INDEX: 26.26 KG/M2 | OXYGEN SATURATION: 98 % | SYSTOLIC BLOOD PRESSURE: 128 MMHG | HEART RATE: 69 BPM | TEMPERATURE: 97.8 F | DIASTOLIC BLOOD PRESSURE: 60 MMHG

## 2018-09-21 DIAGNOSIS — Z13.6 CARDIOVASCULAR SCREENING; LDL GOAL LESS THAN 160: ICD-10-CM

## 2018-09-21 DIAGNOSIS — Z86.73 HISTORY OF CVA (CEREBROVASCULAR ACCIDENT): ICD-10-CM

## 2018-09-21 DIAGNOSIS — I10 ESSENTIAL HYPERTENSION WITH GOAL BLOOD PRESSURE LESS THAN 140/90: Primary | ICD-10-CM

## 2018-09-21 DIAGNOSIS — Z11.4 SCREENING FOR HUMAN IMMUNODEFICIENCY VIRUS: ICD-10-CM

## 2018-09-21 DIAGNOSIS — Z23 NEED FOR INFLUENZA VACCINATION: ICD-10-CM

## 2018-09-21 DIAGNOSIS — L30.8 OTHER ECZEMA: ICD-10-CM

## 2018-09-21 LAB
ALT SERPL W P-5'-P-CCNC: 32 U/L (ref 0–50)
ANION GAP SERPL CALCULATED.3IONS-SCNC: 8 MMOL/L (ref 3–14)
BUN SERPL-MCNC: 13 MG/DL (ref 7–30)
CALCIUM SERPL-MCNC: 8.6 MG/DL (ref 8.5–10.1)
CHLORIDE SERPL-SCNC: 106 MMOL/L (ref 94–109)
CHOLEST SERPL-MCNC: 148 MG/DL
CO2 SERPL-SCNC: 28 MMOL/L (ref 20–32)
CREAT SERPL-MCNC: 0.95 MG/DL (ref 0.52–1.04)
GFR SERPL CREATININE-BSD FRML MDRD: 61 ML/MIN/1.7M2
GLUCOSE SERPL-MCNC: 85 MG/DL (ref 70–99)
HDLC SERPL-MCNC: 40 MG/DL
LDLC SERPL CALC-MCNC: 52 MG/DL
NONHDLC SERPL-MCNC: 108 MG/DL
POTASSIUM SERPL-SCNC: 4.1 MMOL/L (ref 3.4–5.3)
SODIUM SERPL-SCNC: 142 MMOL/L (ref 133–144)
TRIGL SERPL-MCNC: 282 MG/DL

## 2018-09-21 PROCEDURE — 87389 HIV-1 AG W/HIV-1&-2 AB AG IA: CPT | Performed by: FAMILY MEDICINE

## 2018-09-21 PROCEDURE — 80048 BASIC METABOLIC PNL TOTAL CA: CPT | Performed by: FAMILY MEDICINE

## 2018-09-21 PROCEDURE — 36415 COLL VENOUS BLD VENIPUNCTURE: CPT | Performed by: FAMILY MEDICINE

## 2018-09-21 PROCEDURE — 80061 LIPID PANEL: CPT | Performed by: FAMILY MEDICINE

## 2018-09-21 PROCEDURE — 90682 RIV4 VACC RECOMBINANT DNA IM: CPT | Performed by: FAMILY MEDICINE

## 2018-09-21 PROCEDURE — 84460 ALANINE AMINO (ALT) (SGPT): CPT | Performed by: FAMILY MEDICINE

## 2018-09-21 PROCEDURE — G0008 ADMIN INFLUENZA VIRUS VAC: HCPCS | Performed by: FAMILY MEDICINE

## 2018-09-21 PROCEDURE — 99214 OFFICE O/P EST MOD 30 MIN: CPT | Mod: 25 | Performed by: FAMILY MEDICINE

## 2018-09-21 RX ORDER — SIMVASTATIN 20 MG
20 TABLET ORAL EVERY EVENING
Qty: 90 TABLET | Refills: 1 | Status: SHIPPED | OUTPATIENT
Start: 2018-09-21 | End: 2019-03-12

## 2018-09-21 RX ORDER — TRIAMCINOLONE ACETONIDE 1 MG/G
CREAM TOPICAL
Qty: 80 G | Refills: 0 | Status: SHIPPED | OUTPATIENT
Start: 2018-09-21 | End: 2022-03-21

## 2018-09-21 RX ORDER — METOPROLOL SUCCINATE 50 MG/1
50 TABLET, EXTENDED RELEASE ORAL DAILY
Qty: 90 TABLET | Refills: 1 | Status: SHIPPED | OUTPATIENT
Start: 2018-09-21 | End: 2019-03-12

## 2018-09-21 ASSESSMENT — PAIN SCALES - GENERAL: PAINLEVEL: EXTREME PAIN (8)

## 2018-09-21 NOTE — MR AVS SNAPSHOT
After Visit Summary   9/21/2018    Anita Bennett    MRN: [id number]           Patient Information     Date Of Birth          1960        Visit Information        Provider Department      9/21/2018 1:45 PM Mulugeta Sharma MD; [facility] [facility]        Today's Diagnoses     Essential hypertension with goal blood pressure less than 140/90    -  1    History of CVA (cerebrovascular accident)        CARDIOVASCULAR SCREENING; LDL GOAL LESS THAN 160        Screening for human immunodeficiency virus        Other eczema        Need for influenza vaccination          Care Instructions    At [facility], we strive to deliver an exceptional experience to you, every time we see you.  If you receive a survey in the mail, please send us back your thoughts. We really do value your feedback.    Your care team:                            Family Medicine Internal Medicine   MD Sánchez Miller MD Shantel Branch-Fleming, MD Katya Georgiev PA-C Megan Hill, APRN CNP    Gregory Osborne, MD Pediatrics   Yuri Lara, PALianneC  Gladys Hanna, CNP MD Marleny Sharma APRN CNP   MD Radha Del Cid MD Deborah Mielke, MD Kim Thein, APRN CNP      Clinic hours: Monday - Thursday 7 am-7 pm; Fridays 7 am-5 pm.   Urgent care: Monday - Friday 11 am-9 pm; Saturday and Sunday 9 am-5 pm.  Pharmacy : Monday -Thursday 8 am-8 pm; Friday 8 am-6 pm; Saturday and Sunday 9 am-5 pm.     Clinic: [phone]   Pharmacy: [phone]                Follow-ups after your visit        Follow-up notes from your care team     Return in about 6 months (around 3/21/2019) for full physical with Pap smear, blood pressure check.      Who to contact     If you have questions or need follow up information about today's clinic visit or your schedule please contact [facility] directly at [phone].  Normal or non-critical lab and  "imaging results will be communicated to you by MyChart, letter or phone within 4 business days after the clinic has received the results. If you do not hear from us within 7 days, please contact the clinic through ScriptR[employer] or phone. If you have a critical or abnormal lab result, we will notify you by phone as soon as possible.  Submit refill requests through Weave or call your pharmacy and they will forward the refill request to us. Please allow 3 business days for your refill to be completed.          Additional Information About Your Visit        [employer] Information     Weave lets you send messages to your doctor, view your test results, renew your prescriptions, schedule appointments and more. To sign up, go to [email or web address] . Click on \"Log in\" on the left side of the screen, which will take you to the Welcome page. Then click on \"Sign up Now\" on the right side of the page.     You will be asked to enter the access code listed below, as well as some personal information. Please follow the directions to create your username and password.     Your access code is: [email or web address]  Expires: 2018  2:48 PM     Your access code will  in 90 days. If you need help or a new code, please call your [facility] clinic or [phone].        Care EveryWhere ID     This is your Care EveryWhere ID. This could be used by other organizations to access your [facility] medical records  [id number]        Your Vitals Were     Pulse Temperature Height Pulse Oximetry BMI (Body Mass Index)       69 97.8  F (36.6  C) (Oral) 4' 11\" (1.499 m) 98% 26.26 kg/m2        Blood Pressure from Last 3 Encounters:   18 128/60   18 112/64   17 132/74    Weight from Last 3 Encounters:   17 130 lb (59 kg)   16 130 lb (59 kg)   16 130 lb (59 kg)              We Performed the Following     ALT     Basic metabolic panel     C RIV4 (FLUBLOK) VACCINE RECOMBINANT DNA PRSRV ANTIBIO FREE, IM     HIV Antigen " Antibody Combo     Lipid panel reflex to direct LDL Non-fasting          Today's Medication Changes          These changes are accurate as of 9/21/18  2:49 PM.  If you have any questions, ask your nurse or doctor.               Start taking these medicines.        Dose/Directions    triamcinolone 0.1 % cream   Commonly known as:  KENALOG   Used for:  Other eczema   Started by:  Mulugeta Sharma MD        Apply sparingly to affected area three times daily as needed   Quantity:  80 g   Refills:  0            Where to get your medicines      These medications were sent to [facility]/pharmacy #[id number] - [facility], [place] - [address]  [address], [facility] [address]     Phone:  [phone]     metoprolol succinate 50 MG 24 hr tablet    simvastatin 20 MG tablet    triamcinolone 0.1 % cream                Primary Care Provider Office Phone # Fax #    Mulugeta Sharma [phone]       [address]  [place] [facility] [place] [address]        Equal Access to Services     [facility]: Hadii aad ku hadasho Soomaali, waaxda luqadaha, qaybta kaalmada adeegyada, waxay idiin hayaan adeeg kharash la'loraine . [facility] [phone].    ATENCIÓN: Si habla español, tiene a lucero disposición servicios gratuitos de asistencia lingüística. [facility] [phone].    We comply with applicable federal civil rights laws and [place] laws. We do not discriminate on the basis of race, color, national origin, age, disability, sex, sexual orientation, or gender identity.            Thank you!     Thank you for choosing [facility]  for your care. Our goal is always to provide you with excellent care. Hearing back from our patients is one way we can continue to improve our services. Please take a few minutes to complete the written survey that you may receive in the mail after your visit with us. Thank you!             Your Updated Medication List - Protect others around you: Learn how to safely use, store and  throw away your medicines at www.disposemymeds.org.          This list is accurate as of 9/21/18  2:49 PM.  Always use your most recent med list.                   Brand Name Dispense Instructions for use Diagnosis    aspirin 325 MG tablet     100 tablet    TAKE 1 TABLET BY MOUTH DAILY    Personal history of transient cerebral ischemia       azelastine 0.05 % ophthalmic solution    OPTIVAR    1 Bottle    Apply 1 drop to eye 2 times daily    Allergic conjunctivitis, bilateral       baclofen 10 MG tablet    LIORESAL    90 tablet    Take 1 tablet (10 mg) by mouth 3 times daily as needed for muscle spasms (arm pain)    Right flaccid hemiplegia (H), History of CVA (cerebrovascular accident)       diclofenac 1.3 % Patch    FLECTOR    90 patch    Place 3 patches onto the skin daily as needed for moderate pain    Right flaccid hemiplegia (H)       gabapentin 400 MG capsule    NEURONTIN     1 capsule 3 times daily for arm mobility.        LEXAPRO 10 MG tablet   Generic drug:  escitalopram      Take 10 mg by mouth daily        losartan-hydrochlorothiazide 100-25 MG per tablet    HYZAAR    90 tablet    Take 1/2 tablet daily as needed for high blood pressure.    Essential hypertension with goal blood pressure less than 140/90       metoprolol succinate 50 MG 24 hr tablet    TOPROL-XL    90 tablet    Take 1 tablet (50 mg) by mouth daily for blood pressure.    Essential hypertension with goal blood pressure less than 140/90       nabumetone 500 MG tablet    RELAFEN    60 tablet    TAKE 1-2 TABLETS BY MOUTH TWICE A DAY AS NEEDED FOR PAIN IN ARM OR HIP, TAKE WITH FOOD.    Right shoulder pain, unspecified chronicity, Right upper limb pain       order for DME     1 Month    Equipment being ordered: Blood pressure machine. Large Gloves. Large Adult Diapers/Depends. Disposable pad. Sheet skin bed pad. Quantity limits and brand per insurance preference.    Right flaccid hemiplegia (H), Essential hypertension, benign, History of CVA  (cerebrovascular accident)       pantoprazole 40 MG EC tablet    PROTONIX    90 tablet    Take 1 tablet (40 mg) by mouth daily for reflux.    Gastroesophageal reflux disease without esophagitis       SENEXON-S 8.6-50 MG per tablet   Generic drug:  senna-docusate     30 tablet    TAKE 1 TABLET BY MOUTH DAILY AS NEEDED FOR CONSTIPATION    DIAGNOSIS NOT YET DEFINED       simvastatin 20 MG tablet    ZOCOR    90 tablet    Take 1 tablet (20 mg) by mouth every evening for cholesterol.    History of CVA (cerebrovascular accident), CARDIOVASCULAR SCREENING; LDL GOAL LESS THAN 160       triamcinolone 0.1 % cream    KENALOG    80 g    Apply sparingly to affected area three times daily as needed    Other eczema

## 2018-09-21 NOTE — PROGRESS NOTES
"  SUBJECTIVE:   Anita Bennett is a 58 year old female who presents to clinic today for the following health issues:  She is accompanied by her  and .     Hyperlipidemia Follow-Up      Rate your low fat/cholesterol diet?: good    Taking statin?  yes, no muscle aches from statin    Other lipid medications/supplements?:  none    Hypertension Follow-up      Outpatient blood pressures are being checked at home. Results are good.    Patient denies any lightheadedness, fatigue or other symptoms from low blood pressure     She has not needed to take the losartan-hydrochlorothiazide for the last few months because her blood pressure has been well controlled    Low Salt Diet: no added salt      Amount of exercise or physical activity: 6-7 days/week for an average of 15 minutes    Problems taking medications regularly: no    Medication side effects: none    Diet: low fat/cholesterol    Past medical, family, and social histories, medications, and allergies are reviewed and updated in [facility].     ROS:  CONSTITUTIONAL: NEGATIVE for fever, chills, change in weight  ENT/MOUTH: NEGATIVE for ear, mouth and throat problems  RESP: NEGATIVE for significant cough or SOB  CV: NEGATIVE for chest pain, palpitations or peripheral edema  ROS otherwise negative    This document serves as a record of the services and decisions personally performed and made by Dr. Sharma. It was created on his behalf by Amira Kaiser, a trained medical scribe. The creation of this document is based the provider's statements to the medical scribe.  Amira Kaiser September 21, 2018 2:36 PM     OBJECTIVE:                                                    /60  Pulse 69  Temp 97.8  F (36.6  C) (Oral)  Ht 1.499 m (4' 11\")  SpO2 98%  BMI 26.26 kg/m2   Body mass index is 26.26 kg/(m^2).     GENERAL: healthy, alert and no distress  EYES: Eyes grossly normal to inspection, PERRL, EOMI, sclerae white and conjunctivae normal  RESP: lungs clear to " auscultation - no crackles or wheezes, no areas of dullness, no tachypnea  CV: Heart regular rate and rhythm without murmur, click or rub. No peripheral edema and peripheral pulses strong  MS: no gross musculoskeletal defects noted, no edema  SKIN: no suspicious lesions or rashes  NEURO: Normal strength and tone, sensory exam grossly normal, mentation intact, oriented times 3 and cranial nerves 2-12 intact  PSYCH: mentation appears normal, affect normal/bright     Diagnostic Test Results:  none      ASSESSMENT/PLAN:                                                      (I10) Essential hypertension with goal blood pressure less than 140/90  (primary encounter diagnosis)  Comment: well controlled  Plan: metoprolol succinate (TOPROL-XL) 50 MG 24 hr         tablet, Basic metabolic panel        Return in about 6 months (around 3/21/2019) for full physical with Pap smear, blood pressure check.     (Z86.73) History of CVA (cerebrovascular accident)  (Z13.6) CARDIOVASCULAR SCREENING; LDL GOAL LESS THAN 160  Comment: non-fasting (last ate 3 hours ago). historically at goal   Plan: simvastatin (ZOCOR) 20 MG tablet, ALT, Lipid         panel reflex to direct LDL Non-fasting        Follow up in 6 months.     (Z11.4) Screening for human immunodeficiency virus  Comment: indications for screening discussed with the patient   Plan: HIV Antigen Antibody Combo          (L30.8) Other eczema  Comment: foot. Refill request.   Plan: triamcinolone (KENALOG) 0.1 % cream        Follow up as needed.     (Z23) Need for influenza vaccination  Comment: Influenza vaccine offered and accepted by patient. She has received it before without problems.   Plan: C RIV4 (FLUBLOK) VACCINE RECOMBINANT DNA PRSRV         ANTIBIO FREE, IM              The information in this document, created by the medical scribe for me, accurately reflects the services I personally performed and the decisions made by me. I have reviewed and approved this document for  accuracy prior to leaving the patient care area. September 21, 2018 2:36 PM   Mulugeta Sharma MD                             Injectable Influenza Immunization Documentation    1.  Is the person to be vaccinated sick today?   No    2. Does the person to be vaccinated have an allergy to a component   of the vaccine?   No  Egg Allergy Algorithm Link    3. Has the person to be vaccinated ever had a serious reaction   to influenza vaccine in the past?   No    4. Has the person to be vaccinated ever had Guillain-Barré syndrome?   No    Form completed by LOU Valentine MA

## 2018-09-21 NOTE — PATIENT INSTRUCTIONS
At [facility], we strive to deliver an exceptional experience to you, every time we see you.  If you receive a survey in the mail, please send us back your thoughts. We really do value your feedback.    Your care team:                            Family Medicine Internal Medicine   MD Sánchez Miller MD Shantel Branch-Fleming, MD Katya Georgiev PA-C Megan Hill, APRN LEXIE Osborne MD Pediatrics   Yuri Lara, YOANDY Hanna, MD Marleny Aragon APRN CNP   MD Radha Del Cid MD Deborah Mielke, MD Amaya Choudhury, APRN [facility]      Clinic hours: Monday - Thursday 7 am-7 pm; Fridays 7 am-5 pm.   Urgent care: Monday - Friday 11 am-9 pm; Saturday and Sunday 9 am-5 pm.  Pharmacy : Monday -Thursday 8 am-8 pm; Friday 8 am-6 pm; Saturday and Sunday 9 am-5 pm.     Clinic: [phone]   Pharmacy: [phone]

## 2018-09-24 LAB — HIV 1+2 AB+HIV1 P24 AG SERPL QL IA: NONREACTIVE

## 2018-10-02 DIAGNOSIS — H10.13 ALLERGIC CONJUNCTIVITIS, BILATERAL: ICD-10-CM

## 2018-10-02 NOTE — TELEPHONE ENCOUNTER
"Requested Prescriptions   Pending Prescriptions Disp Refills     azelastine (OPTIVAR) 0.05 % ophthalmic solution [Pharmacy Med Name: AZELASTINE HCL 0.05% DROPS]  Last Written Prescription Date:  07/17/18  Last Fill Quantity: 1,  # refills: 2   Last Office Visit with G, P or [facility] prescribing provider:  09/21/18Nancy   Future Office Visit:    6 mL 2     Sig: APPLY 1 DROP TO EYE 2 TIMES DAILY    Miscellaneous Opthalmic Allergy Drops Protocol Passed    10/2/2018 10:33 AM       Passed - Patient is age 4 or older       Passed - Recent (12 mo) or future (30 days) visit within the authorizing provider's specialty    Patient had office visit in the last 12 months or has a visit in the next 30 days with authorizing provider or within the authorizing provider's specialty.  See \"Patient Info\" tab in inbasket, or \"Choose Columns\" in Meds & Orders section of the refill encounter.           Passed - Patient is not pregnant       Passed - No positive pregnancy test on record in past 12 mos          "

## 2018-10-03 RX ORDER — AZELASTINE HYDROCHLORIDE 0.5 MG/ML
SOLUTION/ DROPS OPHTHALMIC
Qty: 6 ML | Refills: 5 | Status: SHIPPED | OUTPATIENT
Start: 2018-10-03 | End: 2019-05-06

## 2018-10-03 NOTE — TELEPHONE ENCOUNTER
Prescription [place] per [facility] [place].    Karlene Zuleta RN, [facility]

## 2018-10-06 DIAGNOSIS — Z53.9 DIAGNOSIS NOT YET DEFINED: ICD-10-CM

## 2018-10-06 NOTE — TELEPHONE ENCOUNTER
"Requested Prescriptions   Pending Prescriptions Disp Refills     SENEXON-S 8.6-50 MG per tablet [Pharmacy Med Name: SENEXON-S TABLET] 30 tablet 2    Last Written Prescription Date:  7/12/18  Last Fill Quantity: 30,  # refills: 2   Last Office Visit with G, P or [facility] prescribing provider:  9/21/2018     Future Office Visit:      Sig: TAKE 1 TABLET BY MOUTH DAILY AS NEEDED FOR CONSTIPATION    Laxatives Protocol Passed    10/6/2018 11:48 AM       Passed - Patient is age 6 or older       Passed - Recent (12 mo) or future (30 days) visit within the authorizing provider's specialty    Patient had office visit in the last 12 months or has a visit in the next 30 days with authorizing provider or within the authorizing provider's specialty.  See \"Patient Info\" tab in inbasket, or \"Choose Columns\" in Meds & Orders section of the refill encounter.              "

## 2018-10-09 RX ORDER — DOCUSATE SODIUM 50MG AND SENNOSIDES 8.6MG 8.6; 5 MG/1; MG/1
TABLET, FILM COATED ORAL
Qty: 30 TABLET | Refills: 2 | Status: SHIPPED | OUTPATIENT
Start: 2018-10-09 | End: 2018-12-31

## 2018-10-09 NOTE — TELEPHONE ENCOUNTER
Pres[place] per [facility] [place].    Eliu Singer RN, BSN

## 2018-10-10 ENCOUNTER — RADIANT APPOINTMENT (OUTPATIENT)
Dept: MAMMOGRAPHY | Facility: CLINIC | Age: 58
End: 2018-10-10
Attending: FAMILY MEDICINE
Payer: MEDICARE

## 2018-10-10 DIAGNOSIS — Z12.31 SCREENING MAMMOGRAM, ENCOUNTER FOR: ICD-10-CM

## 2018-10-10 PROCEDURE — 77067 SCR MAMMO BI INCL CAD: CPT | Performed by: RADIOLOGY

## 2018-11-19 ENCOUNTER — HEALTH MAINTENANCE LETTER (OUTPATIENT)
Age: 58
End: 2018-11-19

## 2018-11-30 DIAGNOSIS — M79.601 RIGHT UPPER LIMB PAIN: ICD-10-CM

## 2018-11-30 DIAGNOSIS — M25.511 RIGHT SHOULDER PAIN, UNSPECIFIED CHRONICITY: ICD-10-CM

## 2018-11-30 NOTE — TELEPHONE ENCOUNTER
"Requested Prescriptions   Pending Prescriptions Disp Refills     nabumetone (RELAFEN) 500 MG tablet [Pharmacy Med Name: NABUMETONE 500 MG TABLET]  Last Written Prescription Date:  08/23/18  Last Fill Quantity: 60,  # refills: 5   Last Office Visit with ADDY, JOHANA or [facility] prescribing provider:  09/21/18Nancy   Future Office Visit:    60 tablet 5     Sig: TAKE 1-2 TABLETS BY MOUTH TWICE A DAY AS NEEDED FOR PAIN IN ARM OR HIP, TAKE WITH FOOD.    NSAID Medications Failed    11/30/2018  1:20 PM       Failed - Normal AST on file in past 12 months    No lab results found.         Failed - Normal CBC on file in past 12 months    Recent Labs   Lab Test  10/31/16   1400   WBC  5.2   RBC  3.57*   HGB  12.1   HCT  35.4   PLT  220                Passed - Blood pressure under 140/90 in past 12 months    BP Readings from Last 3 Encounters:   09/21/18 128/60   03/19/18 112/64   09/12/17 132/74                Passed - Normal ALT on file in past 12 months    Recent Labs   Lab Test  09/21/18   1452   ALT  32            Passed - Recent (12 mo) or future (30 days) visit within the authorizing provider's specialty    Patient had office visit in the last 12 months or has a visit in the next 30 days with authorizing provider or within the authorizing provider's specialty.  See \"Patient Info\" tab in inbasket, or \"Choose Columns\" in Meds & Orders section of the refill encounter.             Passed - Patient is age 6-64 years       Passed - No active pregnancy on record       Passed - Normal serum creatinine on file in past 12 months    Recent Labs   Lab Test  09/21/18   1452   CR  0.95            Passed - No positive pregnancy test in past 12 months          "

## 2018-12-03 NOTE — TELEPHONE ENCOUNTER
Routing refill request to provider for review/approval because:  Needs following:     Normal AST on file in past 12 months    Normal CBC on file in past 12 months       Elmira Post RN

## 2018-12-04 RX ORDER — NABUMETONE 500 MG/1
TABLET, FILM COATED ORAL
Qty: 60 TABLET | Refills: 5 | Status: SHIPPED | OUTPATIENT
Start: 2018-12-04 | End: 2019-05-21

## 2018-12-06 DIAGNOSIS — I10 ESSENTIAL HYPERTENSION WITH GOAL BLOOD PRESSURE LESS THAN 140/90: ICD-10-CM

## 2018-12-06 NOTE — TELEPHONE ENCOUNTER
"Requested Prescriptions   Pending Prescriptions Disp Refills     losartan-hydrochlorothiazide (HYZAAR) 100-25 MG tablet [Pharmacy Med Name: LOSARTAN-HCTZ 100-25 MG TAB]  Last Written Prescription Date:  03/19/18  Last Fill Quantity: 90,  # refills: 0   Last Office Visit with FMADDY, LI or [facility] prescribing provider:  09/21/18Nancy   Future Office Visit:    90 tablet 0     Sig: TAKE 1/2 TABLET BY MOUTH DAILY AS NEEDED FOR HIGH BLOOD PRESSURE.    Angiotensin-II Receptors Passed    12/6/2018 12:35 PM       Passed - Blood pressure under 140/90 in past 12 months    BP Readings from Last 3 Encounters:   09/21/18 128/60   03/19/18 112/64   09/12/17 132/74                Passed - Recent (12 mo) or future (30 days) visit within the authorizing provider's specialty    Patient had office visit in the last 12 months or has a visit in the next 30 days with authorizing provider or within the authorizing provider's specialty.  See \"Patient Info\" tab in inbasket, or \"Choose Columns\" in Meds & Orders section of the refill encounter.             Passed - Patient is age 18 or older       Passed - No active pregnancy on record       Passed - Normal serum creatinine on file in past 12 months    Recent Labs   Lab Test  09/21/18   1452   CR  0.95            Passed - Normal serum potassium on file in past 12 months    Recent Labs   Lab Test  09/21/18   1452   POTASSIUM  4.1                   Passed - No positive pregnancy test in past 12 months          "

## 2018-12-11 RX ORDER — LOSARTAN POTASSIUM AND HYDROCHLOROTHIAZIDE 25; 100 MG/1; MG/1
TABLET ORAL
Qty: 90 TABLET | Refills: 0 | Status: SHIPPED | OUTPATIENT
Start: 2018-12-11 | End: 2019-09-23

## 2018-12-11 NOTE — TELEPHONE ENCOUNTER
Prescription approved per [facility] [place]ill Protocol.  Danielle Park RN

## 2018-12-13 ENCOUNTER — TRANSFERRED RECORDS (OUTPATIENT)
Dept: HEALTH INFORMATION MANAGEMENT | Facility: CLINIC | Age: 58
End: 2018-12-13

## 2018-12-31 DIAGNOSIS — Z53.9 DIAGNOSIS NOT YET DEFINED: ICD-10-CM

## 2019-01-01 NOTE — TELEPHONE ENCOUNTER
"Requested Prescriptions   Pending Prescriptions Disp Refills     [facility] SENNA PLUS 8.6-50 MG tablet [Pharmacy Med Name: [facility] SENNA PLUS TABLET] 30 tablet 2     Sig: TAKE 1 TABLET BY MOUTH DAILY AS NEEDED FOR CONSTIPATION    Laxatives Protocol Passed - 12/31/2018  9:04 PM       Passed - Patient is age 6 or older       Passed - Recent (12 mo) or future (30 days) visit within the authorizing provider's specialty    Patient had office visit in the last 12 months or has a visit in the next 30 days with authorizing provider or within the authorizing provider's specialty.  See \"Patient Info\" tab in inbasket, or \"Choose Columns\" in Meds & Orders section of the refill encounter.      Last Written Prescription Date:  10/9/18  Last Fill Quantity: 30,  # refills: 2   Last office visit: 9/21/2018 with prescribing provider:     Future Office Visit:   Next 5 appointments (look out 90 days)    Mar 25, 2019  1:00 PM CDT  PHYSICAL with Mulugeta Sharma MD  [facility] ([facility] [address] [place] [id number] [phone]                       "

## 2019-01-02 DIAGNOSIS — Z86.73 PERSONAL HISTORY OF TRANSIENT CEREBRAL ISCHEMIA: ICD-10-CM

## 2019-01-02 NOTE — TELEPHONE ENCOUNTER
"Requested Prescriptions   Pending Prescriptions Disp Refills     aspirin (ASA) 325 MG tablet      Last Written Prescription Date:  7/5/18  Last Fill Quantity: 100,  # refills: 1   Last Office Visit with G, P or [facility] prescribing provider:  9/21/18   Future Office Visit:    Next 5 appointments (look out 90 days)    Mar 25, 2019  1:00 PM CDT  PHYSICAL with Mulugeta Sharma MD  [facility] ([facility]) [address] [place] [id number] [phone]          100 tablet 1     Sig: Take 1 tablet (325 mg) by mouth daily    Analgesics (Non-Narcotic Tylenol and ASA Only) Passed - 1/2/2019  1:51 PM       Passed - Recent (12 mo) or future (30 days) visit within the authorizing provider's specialty    Patient had office visit in the last 12 months or has a visit in the next 30 days with authorizing provider or within the authorizing provider's specialty.  See \"Patient Info\" tab in inbasket, or \"Choose Columns\" in Meds & Orders section of the refill encounter.             Passed - Patient is age 20 years or older    If ASA is flagged for ages under 20 years old. Forward to provider for confirmation Ryes Syndrome is not a concern.                    Lawson Faarax  [facility]  "

## 2019-01-04 RX ORDER — INCONTINENCE PAD,LINER,DISP
EACH MISCELLANEOUS
Qty: 30 TABLET | Refills: 7 | Status: SHIPPED | OUTPATIENT
Start: 2019-01-04 | End: 2019-08-29

## 2019-01-04 RX ORDER — ASPIRIN 325 MG
325 TABLET ORAL DAILY
Qty: 100 TABLET | Refills: 1 | Status: SHIPPED | OUTPATIENT
Start: 2019-01-04 | End: 2019-06-29

## 2019-01-04 NOTE — TELEPHONE ENCOUNTER
Pres[place] per [facility] [place].    Luz Marina Avila RN

## 2019-01-04 NOTE — TELEPHONE ENCOUNTER
Prescription approved per [facility] [place]ill [place].  Danielle Park RN

## 2019-03-12 DIAGNOSIS — Z13.6 CARDIOVASCULAR SCREENING; LDL GOAL LESS THAN 160: ICD-10-CM

## 2019-03-12 DIAGNOSIS — Z86.73 HISTORY OF CVA (CEREBROVASCULAR ACCIDENT): ICD-10-CM

## 2019-03-12 DIAGNOSIS — I10 ESSENTIAL HYPERTENSION WITH GOAL BLOOD PRESSURE LESS THAN 140/90: ICD-10-CM

## 2019-03-12 NOTE — TELEPHONE ENCOUNTER
"Requested Prescriptions   Pending Prescriptions Disp Refills     simvastatin (ZOCOR) 20 MG tablet [Pharmacy Med Name: SIMVASTATIN 20 MG TABLET] 90 tablet 1          Last Written Prescription Date:  9/21/18  Last Fill Quantity: 90,  # refills: 1   Last Office Visit with FMG, UMP or [facility] prescribing provider:  9/21/18   Future Office Visit:    Next 5 appointments (look out 90 days)    Mar 25, 2019  1:00 PM CDT  PHYSICAL with Mulugeta Sharma MD  [facility] ([facility]) [address] [place] [id number] [phone]          Sig: TAKE 1 TABLET (20 MG) BY MOUTH EVERY EVENING FOR CHOLESTEROL.    Statins Protocol Passed - 3/12/2019  4:35 PM       Passed - LDL on file in past 12 months    Recent Labs   Lab Test 09/21/18  1452   LDL 52            Passed - No abnormal creatine kinase in past 12 months    No lab results found.            Passed - Recent (12 mo) or future (30 days) visit within the authorizing provider's specialty    Patient had office visit in the last 12 months or has a visit in the next 30 days with authorizing provider or within the authorizing provider's specialty.  See \"Patient Info\" tab in inbasket, or \"Choose Columns\" in Meds & Orders section of the refill encounter.             Passed - Medication is active on med list       Passed - Patient is age 18 or older       Passed - No active pregnancy on record       Passed - No positive pregnancy test in past 12 months              Lawson Faarax  [facility]  "

## 2019-03-12 NOTE — TELEPHONE ENCOUNTER
"Requested Prescriptions   Pending Prescriptions Disp Refills     metoprolol succinate ER (TOPROL-XL) 50 MG 24 hr tablet [Pharmacy Med Name: METOPROLOL SUCC ER 50 MG TAB] 90 tablet 1      Last Written Prescription Date:  09/21/18  Last Fill Quantity: 90,  # refills: 1   Last Office Visit with FMG, P or [facility] prescribing provider:  09/21/18Nancy   Future Office Visit:    Next 5 appointments (look out 90 days)    Mar 25, 2019  1:00 PM CDT  PHYSICAL with Mulugeta Sharma MD  [facility] ([facility]) [address] [place] [id number] [phone]        Sig: TAKE 1 TABLET (50 MG) BY MOUTH DAILY FOR BLOOD PRESSURE.    Beta-Blockers Protocol Passed - 3/12/2019  1:58 AM       Passed - Blood pressure under 140/90 in past 12 months    BP Readings from Last 3 Encounters:   09/21/18 128/60   03/19/18 112/64   09/12/17 132/74                Passed - Patient is age 6 or older       Passed - Recent (12 mo) or future (30 days) visit within the authorizing provider's specialty    Patient had office visit in the last 12 months or has a visit in the next 30 days with authorizing provider or within the authorizing provider's specialty.  See \"Patient Info\" tab in inbasket, or \"Choose Columns\" in Meds & Orders section of the refill encounter.             Passed - Medication is active on med list          "

## 2019-03-14 RX ORDER — SIMVASTATIN 20 MG
20 TABLET ORAL EVERY EVENING
Qty: 90 TABLET | Refills: 1 | Status: SHIPPED | OUTPATIENT
Start: 2019-03-14 | End: 2019-09-23

## 2019-03-14 RX ORDER — METOPROLOL SUCCINATE 50 MG/1
50 TABLET, EXTENDED RELEASE ORAL DAILY
Qty: 90 TABLET | Refills: 1 | Status: SHIPPED | OUTPATIENT
Start: 2019-03-14 | End: 2019-09-23

## 2019-03-14 NOTE — TELEPHONE ENCOUNTER
Prescription approved per [facility] Refill Protocol.      Eliu Singer RN, BSN

## 2019-03-14 NOTE — TELEPHONE ENCOUNTER
Pres[place] per [facility] [place].    Karlene Zuleta RN, [facility]

## 2019-03-21 ENCOUNTER — TRANSFERRED RECORDS (OUTPATIENT)
Dept: HEALTH INFORMATION MANAGEMENT | Facility: CLINIC | Age: 59
End: 2019-03-21

## 2019-03-25 ENCOUNTER — OFFICE VISIT (OUTPATIENT)
Dept: FAMILY MEDICINE | Facility: CLINIC | Age: 59
End: 2019-03-25
Payer: MEDICARE

## 2019-03-25 VITALS
BODY MASS INDEX: 26.26 KG/M2 | TEMPERATURE: 97.8 F | HEART RATE: 75 BPM | SYSTOLIC BLOOD PRESSURE: 109 MMHG | OXYGEN SATURATION: 94 % | HEIGHT: 59 IN | DIASTOLIC BLOOD PRESSURE: 69 MMHG

## 2019-03-25 DIAGNOSIS — Z13.6 CARDIOVASCULAR SCREENING; LDL GOAL LESS THAN 160: ICD-10-CM

## 2019-03-25 DIAGNOSIS — K21.9 GASTROESOPHAGEAL REFLUX DISEASE WITHOUT ESOPHAGITIS: ICD-10-CM

## 2019-03-25 DIAGNOSIS — Z86.73 HISTORY OF CVA (CEREBROVASCULAR ACCIDENT): ICD-10-CM

## 2019-03-25 DIAGNOSIS — Z00.00 ENCOUNTER FOR ROUTINE ADULT HEALTH EXAMINATION WITHOUT ABNORMAL FINDINGS: Primary | ICD-10-CM

## 2019-03-25 DIAGNOSIS — Z23 NEED FOR SHINGLES VACCINE: ICD-10-CM

## 2019-03-25 DIAGNOSIS — Z12.4 SCREENING FOR MALIGNANT NEOPLASM OF CERVIX: ICD-10-CM

## 2019-03-25 DIAGNOSIS — I10 ESSENTIAL HYPERTENSION WITH GOAL BLOOD PRESSURE LESS THAN 140/90: ICD-10-CM

## 2019-03-25 LAB
CREAT SERPL-MCNC: 0.87 MG/DL (ref 0.52–1.04)
GFR SERPL CREATININE-BSD FRML MDRD: 74 ML/MIN/{1.73_M2}
POTASSIUM SERPL-SCNC: 4.1 MMOL/L (ref 3.4–5.3)

## 2019-03-25 PROCEDURE — 87624 HPV HI-RISK TYP POOLED RSLT: CPT | Performed by: FAMILY MEDICINE

## 2019-03-25 PROCEDURE — 84132 ASSAY OF SERUM POTASSIUM: CPT | Performed by: FAMILY MEDICINE

## 2019-03-25 PROCEDURE — G0145 SCR C/V CYTO,THINLAYER,RESCR: HCPCS | Performed by: FAMILY MEDICINE

## 2019-03-25 PROCEDURE — 90750 HZV VACC RECOMBINANT IM: CPT | Performed by: FAMILY MEDICINE

## 2019-03-25 PROCEDURE — 82565 ASSAY OF CREATININE: CPT | Performed by: FAMILY MEDICINE

## 2019-03-25 PROCEDURE — 36415 COLL VENOUS BLD VENIPUNCTURE: CPT | Performed by: FAMILY MEDICINE

## 2019-03-25 PROCEDURE — 99396 PREV VISIT EST AGE 40-64: CPT | Mod: 25 | Performed by: FAMILY MEDICINE

## 2019-03-25 PROCEDURE — G0476 HPV COMBO ASSAY CA SCREEN: HCPCS | Performed by: FAMILY MEDICINE

## 2019-03-25 PROCEDURE — 90471 IMMUNIZATION ADMIN: CPT | Performed by: FAMILY MEDICINE

## 2019-03-25 RX ORDER — PANTOPRAZOLE SODIUM 40 MG/1
40 TABLET, DELAYED RELEASE ORAL DAILY
Qty: 90 TABLET | Refills: 3 | Status: SHIPPED | OUTPATIENT
Start: 2019-03-25 | End: 2020-03-30

## 2019-03-25 ASSESSMENT — PAIN SCALES - GENERAL: PAINLEVEL: NO PAIN (0)

## 2019-03-25 NOTE — LETTER
April 2, 2019    Anita Bennett  [address]  [place] [address]    Dear ,  This letter is regarding your recent Pap smear (cervical cancer screening) and Human Papillomavirus (HPV) test.  We are happy to inform you that your Pap smear result is normal. Cervical cancer is closely linked with certain types of HPV. Your results showed no evidence of high-risk HPV.  Therefore we recommend you return in 5 years for your next pap smear and HPV test.  You will still need to return to the clinic every year for an annual exam and other preventive tests.  If you have additional questions regarding this result, please call our registered nurse, Sadie at [phone].  Sincerely,    Mulugeta Sharma MD/[facility]

## 2019-03-25 NOTE — PROGRESS NOTES
SUBJECTIVE:   CC: Anita Bennett is an 58 year old woman who presents for preventive health visit. She is accompanied by her  and .     Healthy Habits:    Do you get at least three servings of calcium containing foods daily (dairy, green leafy vegetables, etc.)? no    Amount of exercise or daily activities, outside of work: yes    Problems taking medications regularly No    Medication side effects: No    Have you had an eye exam in the past two years? no    Do you see a dentist twice per year? no    Do you have sleep apnea, excessive snoring or daytime drowsiness?yes snore      Today's PHQ-2 Score:   PHQ-2 ( 1999 [employer]) 3/30/2017 9/30/2016   Q1: Little interest or pleasure in doing things 1 0   Q2: Feeling down, depressed or hopeless 1 0   PHQ-2 Score 2 0       Abuse: Current or Past(Physical, Sexual or Emotional)- NO  Do you feel safe in your environment? YES    Social History     Tobacco Use     Smoking status: Never Smoker     Smokeless tobacco: Never Used   Substance Use Topics     Alcohol use: Yes     If you drink alcohol do you typically have >3 drinks per day or >7 drinks per week? No                     PAP / HPV 9/14/2015 4/9/2012   PAP NIL NIL     Past medical, family, and social histories, medications, and allergies are reviewed and updated in Epic.     ROS:  CONSTITUTIONAL: NEGATIVE for fever, chills, change in weight  INTEGUMENTARY/SKIN: NEGATIVE for worrisome rashes, moles or lesions  EYES: NEGATIVE for vision changes or irritation  ENT: NEGATIVE for ear, mouth and throat problems  RESP: NEGATIVE for significant cough or SOB  BREAST: NEGATIVE for masses, tenderness or discharge  CV: NEGATIVE for chest pain, palpitations or peripheral edema  GI: NEGATIVE for nausea, abdominal pain, heartburn, or change in bowel habits  : NEGATIVE for unusual urinary or vaginal symptoms. No vaginal bleeding.  MUSCULOSKELETAL: NEGATIVE for significant arthralgias or myalgia  NEURO: baseline neuro  "abnormalities, residuals from CVA  PSYCHIATRIC: NEGATIVE for changes in mood or affect     OBJECTIVE:   /69 (BP Location: Left arm, Patient Position: Right side, Cuff Size: Adult Large)   Pulse 75   Temp 97.8  F (36.6  C) (Oral)   Ht 1.499 m (4' 11\")   SpO2 94%   BMI 26.26 kg/m    EXAM:  GENERAL: healthy, alert and no distress  EYES: Eyes grossly normal to inspection, PERRL and conjunctivae and sclerae normal  HENT: ear canals and TM's normal, nose and mouth without ulcers or lesions  NECK: no adenopathy, no asymmetry, masses, or scars and thyroid normal to palpation  RESP: lungs clear to auscultation - no rales, rhonchi or wheezes  BREAST: normal without masses, tenderness or nipple discharge and no palpable axillary masses or adenopathy  CV: regular rate and rhythm, normal S1 S2, no S3 or S4, no murmur, click or rub, no peripheral edema and peripheral pulses strong  ABDOMEN: soft, nontender, no hepatosplenomegaly, no masses and bowel sounds normal   (female): normal female external genitalia, normal urethral meatus, vaginal mucosa pink, moist, well rugated, and normal cervix/adnexa/uterus without masses or discharge  MS: no gross musculoskeletal defects noted, no edema  MS: wearing LE brace   SKIN: no suspicious lesions or rashes  NEURO: her baseline neuro exam following her CVA includes non-ambulatory, flaccid paralysis of the right side, and speech affected.   PSYCH: mentation appears normal, affect normal/bright    ASSESSMENT/PLAN:   (Z00.00) Encounter for routine adult health examination without abnormal findings  (primary encounter diagnosis)  Comment: Negative screening exam; up-to-date on preventive services.  Plan: Pap imaged thin layer screen with HPV -         recommended age 30 - 65 years (select HPV order        below), HPV High Risk Types DNA Cervical, HC         ZOSTER VACCINE RECOMBINANT ADJUVANTED IM NJX        Follow up in 1 year.     (I10) Essential hypertension with goal blood " "pressure less than 140/90  Comment: well controlled.   Plan: Potassium, Creatinine        Return in about 6 months (around 9/9/2019) for blood pressure check, cholesterol.    (Z12.4) Screening for malignant neoplasm of cervix  Comment:   Plan: Pap imaged thin layer screen with HPV -         recommended age 30 - 65 years (select HPV order        below), HPV High Risk Types DNA Cervical          (Z23) Need for shingles vaccine  Comment: #1  Plan: HC ZOSTER VACCINE RECOMBINANT ADJUVANTED IM NJX        #2 in 2-6 months.    (Z86.73) History of CVA (cerebrovascular accident)  (Z13.6) CARDIOVASCULAR SCREENING; LDL GOAL LESS THAN 160  Comment: She is not fasting today, and her stroke really is not associated with lipids or atherosclerosis  Plan: Check fasting lipids in 6 months. Her moderate-intensity statin prescription is up to date.     (K21.9) Gastroesophageal reflux disease without esophagitis  Comment: Refill request  Plan: pantoprazole (PROTONIX) 40 MG EC tablet            COUNSELING:   Reviewed preventive health counseling, as reflected in patient instructions       Immunizations    Vaccinated for: Zoster      BP Readings from Last 1 Encounters:   03/25/19 109/69     Estimated body mass index is 26.26 kg/m  as calculated from the following:    Height as of this encounter: 1.499 m (4' 11\").    Weight as of 9/12/17: 59 kg (130 lb).    Weight management plan: Pt has very limited mobility and exercse is limited     reports that  has never smoked. she has never used smokeless tobacco.      Counseling Resources:  ATP IV Guidelines  Pooled Cohorts Equation Calculator  Breast Cancer Risk Calculator  FRAX Risk Assessment  ICSI Preventive Guidelines  Dietary Guidelines for Americans, 2010  USDA's MyPlate  ASA Prophylaxis  Lung CA Screening    Mulugeta Sharma MD  [facility]  "

## 2019-03-25 NOTE — PATIENT INSTRUCTIONS
================================================================================  Normal Values   Blood pressure  <140/90 for most adults    <130/80 for some chronic diseases (ask your care team about yours)    BMI (body mass index)  18.5-25 kg/m2 (based on height and weight)     Thank you for visiting [facility]    Normal or non-critical lab and imaging results will be communicated to you by MyChart, letter or phone within 7 days.  If you do not hear from us within 10 days, please call the clinic. If you have a critical or abnormal lab result, we will notify you by phone as soon as possible.     If you have any questions regarding your visit please contact:     Team Comfort:   Clinic Hours Telephone Number   Dr. Mulugeta Osborne Dr. V[place] 7am-5pm  Monday - Friday [phone]  Jaspreet RN  Danielle Cho RN   Pharmacy 8:00am-8pm Monday-Friday    9am-5pm Saturday-Sunday [phone]   Urgent Care 11am-9pm Monday-Friday        9am-5pm Saturday-Sunday [phone]     After hours, weekend or if you need to make an appointment with your primary provider please call [phone].   After Hours nurse advise: call [facility]: [phone]    Medication Refills:  Call your pharmacy and they will forward the refill to us. Please allow 3 business days for your refills to be completed.          Preventive Health Recommendations  Female Ages 50 - 64    Yearly exam: See your health care provider every year in order to  o Review health changes.   o Discuss preventive care.    o Review your medicines if your doctor has prescribed any.      Get a Pap test every three years (unless you have an abnormal result and your provider advises testing more often).    If you get Pap tests with HPV test, you only need to test every 5 years, unless you have an abnormal result.     You do not need a Pap test if your uterus was removed (hysterectomy) and you have not  had cancer.    You should be tested each year for STDs (sexually transmitted diseases) if you're at risk.     Have a mammogram every 1 to 2 years.    Have a colonoscopy at age 50, or have a yearly FIT test (stool test). These exams screen for colon cancer.      Have a cholesterol test every 5 years, or more often if advised.    Have a diabetes test (fasting glucose) every three years. If you are at risk for diabetes, you should have this test more often.     If you are at risk for osteoporosis (brittle bone disease), think about having a bone density scan (DEXA).    Shots: Get a flu shot each year. Get a tetanus shot every 10 years.    Nutrition:     Eat at least 5 servings of fruits and vegetables each day.    Eat whole-grain bread, whole-wheat pasta and brown rice instead of white grains and rice.    Get adequate Calcium and Vitamin D.     Lifestyle    Exercise at least 150 minutes a week (30 minutes a day, 5 days a week). This will help you control your weight and prevent disease.    Limit alcohol to one drink per day.    No smoking.     Wear sunscreen to prevent skin cancer.     See your dentist every six months for an exam and cleaning.    See your eye doctor every 1 to 2 years.    You should receive your second (final) shingles vaccine between 5/25/19 & 9/25/19.

## 2019-03-25 NOTE — NURSING NOTE
1.  Has the patient received the information for the SHINGRIX vaccine? YES    2.  Does the patient have any of the following contraindications?     Allergy to Neomycin or Gelatin? No       Current moderate or severe illness? No  Temp = 97.8  If temp > 99.0 degrees orally or patient has above allergies, vaccine is deferred    3.  The vaccine has been administered in the usual fashion and the patient was instructed to wait 15 minutes before leaving the building in the event of an allergic reaction: YES    Vaccination given by LOU Valentine MA    Recorded by Jocelyn Valentine

## 2019-03-27 LAB
COPATH REPORT: NORMAL
PAP: NORMAL

## 2019-03-29 LAB
FINAL DIAGNOSIS: NORMAL
HPV HR 12 DNA CVX QL NAA+PROBE: NEGATIVE
HPV16 DNA SPEC QL NAA+PROBE: NEGATIVE
HPV18 DNA SPEC QL NAA+PROBE: NEGATIVE
SPECIMEN DESCRIPTION: NORMAL
SPECIMEN SOURCE CVX/VAG CYTO: NORMAL

## 2019-05-06 ENCOUNTER — OFFICE VISIT (OUTPATIENT)
Dept: OPTOMETRY | Facility: CLINIC | Age: 59
End: 2019-05-06
Payer: MEDICARE

## 2019-05-06 ENCOUNTER — APPOINTMENT (OUTPATIENT)
Dept: OPTOMETRY | Facility: CLINIC | Age: 59
End: 2019-05-06
Payer: MEDICARE

## 2019-05-06 DIAGNOSIS — H25.13 NUCLEAR SCLEROSIS OF BOTH EYES: ICD-10-CM

## 2019-05-06 DIAGNOSIS — H52.4 PRESBYOPIA: ICD-10-CM

## 2019-05-06 DIAGNOSIS — Z01.00 EXAMINATION OF EYES AND VISION: Primary | ICD-10-CM

## 2019-05-06 DIAGNOSIS — H10.13 ALLERGIC CONJUNCTIVITIS, BILATERAL: ICD-10-CM

## 2019-05-06 DIAGNOSIS — H52.03 HYPEROPIA, BILATERAL: ICD-10-CM

## 2019-05-06 DIAGNOSIS — H52.223 REGULAR ASTIGMATISM OF BOTH EYES: ICD-10-CM

## 2019-05-06 DIAGNOSIS — H11.001 PTERYGIUM OF RIGHT EYE: ICD-10-CM

## 2019-05-06 PROCEDURE — 92014 COMPRE OPH EXAM EST PT 1/>: CPT | Performed by: OPTOMETRIST

## 2019-05-06 PROCEDURE — 92341 FIT SPECTACLES BIFOCAL: CPT | Performed by: OPTOMETRIST

## 2019-05-06 PROCEDURE — 92015 DETERMINE REFRACTIVE STATE: CPT | Mod: GY | Performed by: OPTOMETRIST

## 2019-05-06 RX ORDER — AZELASTINE HYDROCHLORIDE 0.5 MG/ML
1 SOLUTION/ DROPS OPHTHALMIC 2 TIMES DAILY PRN
Qty: 5 ML | Refills: 11 | Status: SHIPPED | OUTPATIENT
Start: 2019-05-06 | End: 2020-03-26

## 2019-05-06 ASSESSMENT — VISUAL ACUITY
METHOD: NUMBERS - SINGLE
OS_SC: 20/100
CORRECTION_TYPE: GLASSES
OS_CC: UNABLE
OD_CC: UNABLE
OD_SC: 20/60

## 2019-05-06 ASSESSMENT — EXTERNAL EXAM - RIGHT EYE: OD_EXAM: NORMAL

## 2019-05-06 ASSESSMENT — REFRACTION_WEARINGRX
OD_CYLINDER: +0.50
OD_AXIS: 032
OS_CYLINDER: SPHERE
OD_CYLINDER: +0.75
SPECS_TYPE: RX READERS
OS_ADD: +2.50
OD_SPHERE: +3.00
OD_SPHERE: +0.25
OS_SPHERE: PLANO
OD_ADD: +2.50
OS_CYLINDER: SPHERE
OD_AXIS: 030
OS_SPHERE: +2.50

## 2019-05-06 ASSESSMENT — TONOMETRY
OS_IOP_MMHG: 14
IOP_METHOD: TONOPEN
OD_IOP_MMHG: 16

## 2019-05-06 ASSESSMENT — REFRACTION_MANIFEST
OD_CYLINDER: +0.50
OD_SPHERE: +1.00
OS_ADD: +2.50
OD_AXIS: 030
OD_ADD: +2.50
OS_SPHERE: +0.50
OS_AXIS: 180
OS_CYLINDER: +0.50

## 2019-05-06 ASSESSMENT — CONF VISUAL FIELD
OS_NORMAL: 1
OD_NORMAL: 1

## 2019-05-06 ASSESSMENT — SLIT LAMP EXAM - LIDS
COMMENTS: NORMAL
COMMENTS: NORMAL

## 2019-05-06 ASSESSMENT — EXTERNAL EXAM - LEFT EYE: OS_EXAM: NORMAL

## 2019-05-06 ASSESSMENT — CUP TO DISC RATIO
OS_RATIO: 0.4
OD_RATIO: 0.4

## 2019-05-06 NOTE — PROGRESS NOTES
Chief Complaint   Patient presents with     Annual Eye Exam      Accompanied by  and Accompanied by   Last Eye Exam: 10-  Dilated Previously: Yes    What are you currently using to see?  glasses       Distance Vision Acuity: Noticed gradual change in both eyes    Near Vision Acuity: Satisfied with vision while reading  with glasses    Eye Comfort: good  Do you use eye drops? : Yes: optivar  Occupation or Hobbies: none    Patient had stroke     Paulette Ayala Optometric Assistant, A.B.O.C.          Medical, surgical and family histories reviewed and updated 5/6/2019.       OBJECTIVE: See Ophthalmology exam    ASSESSMENT:    ICD-10-CM    1. Examination of eyes and vision Z01.00    2. Presbyopia H52.4    3. Hyperopia, bilateral H52.03    4. Regular astigmatism of both eyes H52.223    5. Allergic conjunctivitis, bilateral H10.13 azelastine (OPTIVAR) 0.05 % ophthalmic solution   6. Nuclear sclerosis of both eyes H25.13    7. Pterygium of right eye H11.001       PLAN:     Patient Instructions   Recommend new glasses.    Optivar- 1 drop both eyes 2 x day as needed for itchy eyes.    You have the start of mild cataracts.  You may notice some blurred vision or glare with night driving.  It is important that you wear good sunglasses to protect your eyes from the ultraviolet light from the sun.   This will be monitored with yearly eye exams.    Return in 1 year for a complete eye exam or sooner if needed.    James Jo, OD

## 2019-05-06 NOTE — LETTER
5/6/2019         RE: Anita Bennett  [address]  [place] [address]        Dear Colleague,    Thank you for referring your patient, Anita Bennett, to the [facility]. Please see a copy of my visit note below.    Chief Complaint   Patient presents with     Annual Eye Exam      Accompanied by  and Accompanied by   Last Eye Exam: 10-  Dilated Previously: Yes    What are you currently using to see?  glasses       Distance Vision Acuity: Noticed gradual change in both eyes    Near Vision Acuity: Satisfied with vision while reading  with glasses    Eye Comfort: good  Do you use eye drops? : Yes: optivar  Occupation or Hobbies: none    Patient had stroke     Paulette Ayala Optometric Assistant, A.B.O.C.          Medical, surgical and family histories reviewed and updated 5/6/2019.       OBJECTIVE: See Ophthalmology exam    ASSESSMENT:    ICD-10-CM    1. Examination of eyes and vision Z01.00    2. Presbyopia H52.4    3. Hyperopia, bilateral H52.03    4. Regular astigmatism of both eyes H52.223    5. Allergic conjunctivitis, bilateral H10.13 azelastine (OPTIVAR) 0.05 % ophthalmic solution   6. Nuclear sclerosis of both eyes H25.13    7. Pterygium of right eye H11.001       PLAN:     Patient Instructions   Recommend new glasses.    Optivar- 1 drop both eyes 2 x day as needed for itchy eyes.    You have the start of mild cataracts.  You may notice some blurred vision or glare with night driving.  It is important that you wear good sunglasses to protect your eyes from the ultraviolet light from the sun.   This will be monitored with yearly eye exams.    Return in 1 year for a complete eye exam or sooner if needed.    James Jo, LEATHA                  Again, thank you for allowing me to participate in the care of your patient.        Sincerely,        James Jo, OD

## 2019-05-06 NOTE — PATIENT INSTRUCTIONS
Recommend new glasses.    Optivar- 1 drop both eyes 2 x day as needed for itchy eyes.    You have the start of mild cataracts.  You may notice some blurred vision or glare with night driving.  It is important that you wear good sunglasses to protect your eyes from the ultraviolet light from the sun.   This will be monitored with yearly eye exams.    Return in 1 year for a complete eye exam or sooner if needed.    James Jo, OD    The affects of the dilating drops last for 4- 6 hours.  You will be more sensitive to light and vision will be blurry up close.  Mydriatic sunglasses were given if needed.      Optometry Providers       Clinic Locations                                 Telephone Number   Dr. Stella Donahue   [place]   [place] [phone]     [facility] Hours:                [facility] Hours:       [facility] Hours:   [address]   [address] [address]     [address]  [place] [address]   [place] [address]    [place] [address]  Phone: [phone]                    Phone: [phone]     Phone: [phone]                      Monday 8:00-7:00                          Monday 8:00-7:00                          Monday 8:00-7:00              Tuesday 8:00-6:00                          Tuesday 8:00-7:00                          Tuesday 8:00-7:00              Wednesday 8:00-6:00                  Wednesday 8:00-7:00                   Wednesday 8:00-7:00      Thursday 8:00-6:00                        Thursday 8:00-7:00                         Thursday 8:00-7:00            Friday 8:00-5:00                              Friday 8:00-5:00                              Friday 8:00-5:00    [facility] Hours:   [address] [place] [id number] [phone]    Monday 8:00-7:00  Tuesday 8:00-7:00  Wednesday 8:00-7:00  Thursday 8:00-7:00  Friday 8:00-5:00  Please log on to  [email or web address] to order your contact lenses.  The link is found on the [facility] page.  As always, Thank you for trusting us with your health care needs!

## 2019-05-21 DIAGNOSIS — M79.601 RIGHT UPPER LIMB PAIN: ICD-10-CM

## 2019-05-21 DIAGNOSIS — M25.511 RIGHT SHOULDER PAIN, UNSPECIFIED CHRONICITY: ICD-10-CM

## 2019-05-21 NOTE — TELEPHONE ENCOUNTER
"Requested Prescriptions   Pending Prescriptions Disp Refills     nabumetone (RELAFEN) 500 MG tablet [Pharmacy Med Name: NABUMETONE 500 MG TABLET]      Last Written Prescription Date:  12/4/18  Last Fill Quantity: 60,  # refills: 5   Last Office Visit with G, P or [facility] prescribing provider:  3/25/19   Future Office Visit:      60 tablet 1     Sig: TAKE 1-2 TABLETS BY MOUTH TWICE A DAY AS NEEDED FOR PAIN IN ARM OR HIP, TAKE WITH FOOD.       NSAID Medications Failed - 5/21/2019 10:15 AM        Failed - Normal AST on file in past 12 months     No lab results found.          Failed - Normal CBC on file in past 12 months     Recent Labs   Lab Test 10/31/16  1400   WBC 5.2   RBC 3.57*   HGB 12.1   HCT 35.4            Lawson Faarax  [facility]              Passed - Blood pressure under 140/90 in past 12 months     BP Readings from Last 3 Encounters:   03/25/19 109/69   09/21/18 128/60   03/19/18 112/64                 Passed - Normal ALT on file in past 12 months     Recent Labs   Lab Test 09/21/18  1452   ALT 32             Passed - Recent (12 mo) or future (30 days) visit within the authorizing provider's specialty     Patient had office visit in the last 12 months or has a visit in the next 30 days with authorizing provider or within the authorizing provider's specialty.  See \"Patient Info\" tab in inbasket, or \"Choose Columns\" in Meds & Orders section of the refill encounter.              Passed - Patient is age 6-64 years        Passed - Medication is active on med list        Passed - No active pregnancy on record        Passed - Normal serum creatinine on file in past 12 months     Recent Labs   Lab Test 03/25/19  1440   CR 0.87             Passed - No positive pregnancy test in past 12 months              Lawson Faarax  [facility]  "

## 2019-05-23 RX ORDER — NABUMETONE 500 MG/1
TABLET, FILM COATED ORAL
Qty: 60 TABLET | Refills: 1 | Status: SHIPPED | OUTPATIENT
Start: 2019-05-23 | End: 2019-07-11

## 2019-05-23 NOTE — TELEPHONE ENCOUNTER
Routing refill request to provider for review/approval because:  Labs not current:  ALT; CBC  Danielle Park RN

## 2019-06-20 ENCOUNTER — TRANSFERRED RECORDS (OUTPATIENT)
Dept: HEALTH INFORMATION MANAGEMENT | Facility: CLINIC | Age: 59
End: 2019-06-20

## 2019-06-24 NOTE — TELEPHONE ENCOUNTER
"    escitalopram (LEXAPRO) 5 MG tablet  Last Written Prescription Date:  na  Last Fill Quantity: na,   # refills: na  Last Office Visit: 03/25/19-Edith  Future Office visit:       Routing refill request to provider for review/approval because:  Drug not active on patient's medication list  Medication is reported/historical    Requested Prescriptions   Pending Prescriptions Disp Refills     escitalopram (LEXAPRO) 10 MG tablet       Sig: Take 1 tablet (10 mg) by mouth daily       SSRIs Protocol Passed - 6/24/2019  2:37 PM        Passed - Recent (12 mo) or future (30 days) visit within the authorizing provider's specialty     Patient had office visit in the last 12 months or has a visit in the next 30 days with authorizing provider or within the authorizing provider's specialty.  See \"Patient Info\" tab in inbasket, or \"Choose Columns\" in Meds & Orders section of the refill encounter.              Passed - Medication is active on med list        Passed - Patient is age 18 or older        Passed - No active pregnancy on record        Passed - No positive pregnancy test in last 12 months            "

## 2019-06-25 ENCOUNTER — TELEPHONE (OUTPATIENT)
Dept: FAMILY MEDICINE | Facility: CLINIC | Age: 59
End: 2019-06-25

## 2019-06-25 DIAGNOSIS — N39.46 MIXED INCONTINENCE: ICD-10-CM

## 2019-06-25 DIAGNOSIS — E55.9 VITAMIN D DEFICIENCY: Primary | ICD-10-CM

## 2019-06-25 NOTE — TELEPHONE ENCOUNTER
DME is faxed to [facility] at fax # [phone].  Ashish Lunsford,  [facility]    Written prescription for vit D is faxed to [facility] at fax # [phone].  Ashish Lunsford,  [facility]    Called and left msg for Renee regarding the above information.  Patient has used these places before.  Ashish Lunsford,  [facility]

## 2019-06-25 NOTE — TELEPHONE ENCOUNTER
Reason for Call:  Other prescription    Detailed comments: Needs call back did you send gloves and wipes and Vit D3 to the appropriate pharmacy vendors? The note came to me so I am not sure it went to the people who could actually fill each prescription. I am the Care Coordinator and do not have the usual pharmacy this person uses.Please leave me a message that these were sent to correct pharmacy vendors.     Phone Number can be reached at: [facility] Care Coordinator [phone]    Best Time: any    Can we leave a detailed message on this number? YES    Call taken on 6/25/2019 at 10:56 AM by Lupe Butt

## 2019-06-26 RX ORDER — ESCITALOPRAM OXALATE 10 MG/1
10 TABLET ORAL DAILY
OUTPATIENT
Start: 2019-06-26

## 2019-06-26 NOTE — TELEPHONE ENCOUNTER
Routing refill request to provider for review/approval because:  Medication is reported/historical  Elmira Post RN

## 2019-06-26 NOTE — TELEPHONE ENCOUNTER
MA/TC to call and schedule as patient will need to be seen for Rx. Never gotten at this clinic.    Karlene Zuleta RN, [facility] Triage

## 2019-06-27 NOTE — TELEPHONE ENCOUNTER
This writer attempted to contact pt using interpretor services on 06/27/19      Reason for call needs OV with Dr Sharma in order to get lexapro RX and left message.      If patient calls back:   Schedule Office Visit appointment within 2 weeks with PCP, document that pt called and close encounter         Georgia Brown

## 2019-06-29 DIAGNOSIS — Z86.73 PERSONAL HISTORY OF TRANSIENT CEREBRAL ISCHEMIA: ICD-10-CM

## 2019-07-01 RX ORDER — ASPIRIN 325 MG
325 TABLET ORAL DAILY
Qty: 30 TABLET | Refills: 2 | Status: SHIPPED | OUTPATIENT
Start: 2019-07-01 | End: 2019-10-06

## 2019-07-01 NOTE — TELEPHONE ENCOUNTER
Prescription approved per [facility] Refill Protocol.  Elmira Post RN

## 2019-07-05 DIAGNOSIS — Z53.9 DIAGNOSIS NOT YET DEFINED: Primary | ICD-10-CM

## 2019-07-05 PROCEDURE — G0179 MD RECERTIFICATION HHA PT: HCPCS | Performed by: FAMILY MEDICINE

## 2019-07-08 NOTE — TELEPHONE ENCOUNTER
Patient is scheduled for an appointment in September.  Ashish Lunsford,  [facility]

## 2019-07-09 ENCOUNTER — TRANSFERRED RECORDS (OUTPATIENT)
Dept: HEALTH INFORMATION MANAGEMENT | Facility: CLINIC | Age: 59
End: 2019-07-09

## 2019-07-11 DIAGNOSIS — M25.511 RIGHT SHOULDER PAIN, UNSPECIFIED CHRONICITY: ICD-10-CM

## 2019-07-11 DIAGNOSIS — M79.601 RIGHT UPPER LIMB PAIN: ICD-10-CM

## 2019-07-11 NOTE — TELEPHONE ENCOUNTER
"Requested Prescriptions   Pending Prescriptions Disp Refills     nabumetone (RELAFEN) 500 MG tablet [Pharmacy Med Name: NABUMETONE 500 MG TABLET] 60 tablet 1     Sig: TAKE 1-2 TABLETS BY MOUTH TWICE A DAY AS NEEDED FOR PAIN IN ARM OR HIP, TAKE WITH FOOD.         Last Written Prescription Date:  5/23/19  Last Fill Quantity: 60,  # refills: 1   Last Office Visit with [facility], [facility] or [facility] prescribing provider:  3/25/19   Future Office Visit:    Next 5 appointments (look out 90 days)    Sep 23, 2019 11:20 AM CDT  Office Visit with Mulugeta Sharma MD  [facility] ([facility]) [address] [place] [address] [phone]             NSAID Medications Failed - 7/11/2019 10:06 AM        Failed - Normal AST on file in past 12 months     No lab results found.          Failed - Normal CBC on file in past 12 months     Recent Labs   Lab Test 10/31/16  1400   WBC 5.2   RBC 3.57*   HGB 12.1   HCT 35.4                    Passed - Blood pressure under 140/90 in past 12 months     BP Readings from Last 3 Encounters:   03/25/19 109/69   09/21/18 128/60   03/19/18 112/64                 Passed - Normal ALT on file in past 12 months     Recent Labs   Lab Test 09/21/18  1452   ALT 32             Passed - Recent (12 mo) or future (30 days) visit within the authorizing provider's specialty     Patient had office visit in the last 12 months or has a visit in the next 30 days with authorizing provider or within the authorizing provider's specialty.  See \"Patient Info\" tab in inbasket, or \"Choose Columns\" in Meds & Orders section of the refill encounter.              Passed - Patient is age 6-64 years        Passed - Medication is active on med list        Passed - No active pregnancy on record        Passed - Normal serum creatinine on file in past 12 months     Recent Labs   Lab Test 03/25/19  1440   CR 0.87             Passed - No positive pregnancy test in past 12 " months              Lawson Vargasx  [facility]

## 2019-07-15 RX ORDER — NABUMETONE 500 MG/1
TABLET, FILM COATED ORAL
Qty: 60 TABLET | Refills: 2 | Status: SHIPPED | OUTPATIENT
Start: 2019-07-15 | End: 2019-08-24

## 2019-07-15 NOTE — TELEPHONE ENCOUNTER
Routing refill request to provider for review/approval because:     Due to following;   No Normal AST on file in past 12 months    No Normal CBC on file in past 12 months       Elmira Post RN

## 2019-07-23 ENCOUNTER — TRANSFERRED RECORDS (OUTPATIENT)
Dept: HEALTH INFORMATION MANAGEMENT | Facility: CLINIC | Age: 59
End: 2019-07-23

## 2019-07-30 ENCOUNTER — TRANSFERRED RECORDS (OUTPATIENT)
Dept: HEALTH INFORMATION MANAGEMENT | Facility: CLINIC | Age: 59
End: 2019-07-30

## 2019-08-20 ENCOUNTER — TRANSFERRED RECORDS (OUTPATIENT)
Dept: HEALTH INFORMATION MANAGEMENT | Facility: CLINIC | Age: 59
End: 2019-08-20

## 2019-08-24 DIAGNOSIS — M79.601 RIGHT UPPER LIMB PAIN: ICD-10-CM

## 2019-08-24 DIAGNOSIS — M25.511 RIGHT SHOULDER PAIN, UNSPECIFIED CHRONICITY: ICD-10-CM

## 2019-08-24 NOTE — TELEPHONE ENCOUNTER
"Requested Prescriptions   Pending Prescriptions Disp Refills     nabumetone (RELAFEN) 500 MG tablet [Pharmacy Med Name: NABUMETONE 500 MG TABLET]  Last Written Prescription Date:  7/15/19  Last Fill Quantity: 60,  # refills: 2   Last Office Visit with G, [facility] or [facility] prescribing provider:  3/25/19   Future Office Visit:    Next 5 appointments (look out 90 days)    Sep 23, 2019 11:20 AM CDT  Office Visit with Mulugeta Sharma MD  [facility] ([facility]) [address] [place] [address]  [phone]          60 tablet 2     Sig: TAKE 1-2 TABLETS BY MOUTH TWICE A DAY AS NEEDED FOR PAIN IN ARM OR HIP, TAKE WITH FOOD.       NSAID Medications Failed - 8/24/2019 11:26 AM        Failed - Normal AST on file in past 12 months     No lab results found.          Failed - Normal CBC on file in past 12 months     Recent Labs   Lab Test 10/31/16  1400   WBC 5.2   RBC 3.57*   HGB 12.1   HCT 35.4                    Passed - Blood pressure under 140/90 in past 12 months     BP Readings from Last 3 Encounters:   03/25/19 109/69   09/21/18 128/60   03/19/18 112/64                 Passed - Normal ALT on file in past 12 months     Recent Labs   Lab Test 09/21/18  1452   ALT 32             Passed - Recent (12 mo) or future (30 days) visit within the authorizing provider's specialty     Patient had office visit in the last 12 months or has a visit in the next 30 days with authorizing provider or within the authorizing provider's specialty.  See \"Patient Info\" tab in inbasket, or \"Choose Columns\" in Meds & Orders section of the refill encounter.              Passed - Patient is age 6-64 years        Passed - Medication is active on med list        Passed - No active pregnancy on record        Passed - Normal serum creatinine on file in past 12 months     Recent Labs   Lab Test 03/25/19  1440   CR 0.87             Passed - No positive pregnancy test in past 12 months "

## 2019-08-26 NOTE — TELEPHONE ENCOUNTER
Routing refill request to provider for review/approval because:  Labs not current:  AST and CBC    Karlene Zuleta RN, [facility]

## 2019-08-27 RX ORDER — NABUMETONE 500 MG/1
TABLET, FILM COATED ORAL
Qty: 60 TABLET | Refills: 2 | Status: SHIPPED | OUTPATIENT
Start: 2019-08-27 | End: 2020-11-14

## 2019-08-29 DIAGNOSIS — Z53.9 DIAGNOSIS NOT YET DEFINED: ICD-10-CM

## 2019-08-29 NOTE — TELEPHONE ENCOUNTER
"Requested Prescriptions   Pending Prescriptions Disp Refills     SENEXON-S 8.6-50 MG tablet [Pharmacy Med Name: SENEXON-S TABLET]  Last Written Prescription Date:  01/04/19  Last Fill Quantity: 30,  # refills: 7   Last Office Visit with [facility], [facility] or [facility] prescribing provider:  03/25/19Nancy   Future Office Visit:    Next 5 appointments (look out 90 days)    Sep 23, 2019 11:20 AM CDT  Office Visit with Mulugeta Sharma MD  [facility] ([facility]) [address] [place] [address]  [phone]        30 tablet 4     Sig: TAKE 1 TABLET BY MOUTH DAILY AS NEEDED FOR CONSTIPATION       Laxatives Protocol Passed - 8/29/2019  1:33 AM        Passed - Patient is age 6 or older        Passed - Recent (12 mo) or future (30 days) visit within the authorizing provider's specialty     Patient had office visit in the last 12 months or has a visit in the next 30 days with authorizing provider or within the authorizing provider's specialty.  See \"Patient Info\" tab in inbasket, or \"Choose Columns\" in Meds & Orders section of the refill encounter.              Passed - Medication is active on med list          "

## 2019-08-30 RX ORDER — DOCUSATE SODIUM 50MG AND SENNOSIDES 8.6MG 8.6; 5 MG/1; MG/1
TABLET, FILM COATED ORAL
Qty: 90 TABLET | Refills: 1 | Status: SHIPPED | OUTPATIENT
Start: 2019-08-30 | End: 2020-03-03

## 2019-08-30 NOTE — TELEPHONE ENCOUNTER
Prescription approved per [facility] Refill Protocol.  Gris Patel RN

## 2019-09-22 DIAGNOSIS — Z86.73 HISTORY OF CVA (CEREBROVASCULAR ACCIDENT): ICD-10-CM

## 2019-09-22 DIAGNOSIS — Z13.6 CARDIOVASCULAR SCREENING; LDL GOAL LESS THAN 160: ICD-10-CM

## 2019-09-22 DIAGNOSIS — I10 ESSENTIAL HYPERTENSION WITH GOAL BLOOD PRESSURE LESS THAN 140/90: ICD-10-CM

## 2019-09-22 NOTE — TELEPHONE ENCOUNTER
"Requested Prescriptions   Pending Prescriptions Disp Refills     simvastatin (ZOCOR) 20 MG tablet [Pharmacy Med Name: SIMVASTATIN 20 MG TABLET]  Last Written Prescription Date:  3/14/19  Last Fill Quantity: 90,  # refills: 1   Last Office Visit with [facility] or [facility] prescribing provider:  3/25/19   Future Office Visit:    Next 5 appointments (look out 90 days)    Sep 23, 2019 11:15 AM CDT  Office Visit with Mulugeta Sharma MD, [facility]  [facility] ([address] [address]  [phone]          90 tablet 0     Sig: TAKE 1 TABLET (20 MG) BY MOUTH EVERY EVENING FOR CHOLESTEROL.       Statins Protocol Failed - 9/22/2019 12:16 PM        Failed - LDL on file in past 12 months     Recent Labs   Lab Test 09/21/18  1452   LDL 52             Passed - No abnormal creatine kinase in past 12 months     No lab results found.             Passed - Recent (12 mo) or future (30 days) visit within the authorizing provider's specialty     Patient had office visit in the last 12 months or has a visit in the next 30 days with authorizing provider or within the authorizing provider's specialty.  See \"Patient Info\" tab in inbasket, or \"Choose Columns\" in Meds & Orders section of the refill encounter.              Passed - Medication is active on med list        Passed - Patient is age 18 or older        Passed - No active pregnancy on record        Passed - No positive pregnancy test in past 12 months        metoprolol succinate ER (TOPROL-XL) 50 MG 24 hr tablet [Pharmacy Med Name: METOPROLOL SUCC ER 50 MG TAB]  Last Written Prescription Date:  3/14/19  Last Fill Quantity: 90,  # refills: 1   Last Office Visit with ADDY [facility] or [facility] prescribing provider:  3/25/19   Future Office Visit:    Next 5 appointments (look out 90 days)    Sep 23, 2019 11:15 AM CDT  Office Visit with Mulugeta Sharma MD, [facility] " "SERVICES  [facility] ([facility]) [address] [place] [address] [phone]          90 tablet 0     Sig: TAKE 1 TABLET (50 MG) BY MOUTH DAILY FOR BLOOD PRESSURE.       Beta-Blockers Protocol Passed - 9/22/2019 12:16 PM        Passed - Blood pressure under 140/90 in past 12 months     BP Readings from Last 3 Encounters:   03/25/19 109/69   09/21/18 128/60   03/19/18 112/64                 Passed - Patient is age 6 or older        Passed - Recent (12 mo) or future (30 days) visit within the authorizing provider's specialty     Patient had office visit in the last 12 months or has a visit in the next 30 days with authorizing provider or within the authorizing provider's specialty.  See \"Patient Info\" tab in inbasket, or \"Choose Columns\" in Meds & Orders section of the refill encounter.              Passed - Medication is active on med list          "

## 2019-09-23 ENCOUNTER — OFFICE VISIT (OUTPATIENT)
Dept: FAMILY MEDICINE | Facility: CLINIC | Age: 59
End: 2019-09-23
Payer: MEDICARE

## 2019-09-23 VITALS
SYSTOLIC BLOOD PRESSURE: 118 MMHG | TEMPERATURE: 97.9 F | HEART RATE: 67 BPM | HEIGHT: 59 IN | DIASTOLIC BLOOD PRESSURE: 68 MMHG | BODY MASS INDEX: 26.26 KG/M2 | OXYGEN SATURATION: 90 % | RESPIRATION RATE: 16 BRPM

## 2019-09-23 DIAGNOSIS — Z86.73 HISTORY OF CVA (CEREBROVASCULAR ACCIDENT): ICD-10-CM

## 2019-09-23 DIAGNOSIS — I10 ESSENTIAL HYPERTENSION WITH GOAL BLOOD PRESSURE LESS THAN 140/90: Primary | ICD-10-CM

## 2019-09-23 DIAGNOSIS — Z13.6 CARDIOVASCULAR SCREENING; LDL GOAL LESS THAN 160: ICD-10-CM

## 2019-09-23 DIAGNOSIS — Z23 NEED FOR IMMUNIZATION AGAINST INFLUENZA: ICD-10-CM

## 2019-09-23 LAB
ALT SERPL W P-5'-P-CCNC: 48 U/L (ref 0–50)
ANION GAP SERPL CALCULATED.3IONS-SCNC: 7 MMOL/L (ref 3–14)
BUN SERPL-MCNC: 12 MG/DL (ref 7–30)
CALCIUM SERPL-MCNC: 9.2 MG/DL (ref 8.5–10.1)
CHLORIDE SERPL-SCNC: 108 MMOL/L (ref 94–109)
CHOLEST SERPL-MCNC: 149 MG/DL
CO2 SERPL-SCNC: 29 MMOL/L (ref 20–32)
CREAT SERPL-MCNC: 0.93 MG/DL (ref 0.52–1.04)
GFR SERPL CREATININE-BSD FRML MDRD: 68 ML/MIN/{1.73_M2}
GLUCOSE SERPL-MCNC: 88 MG/DL (ref 70–99)
HDLC SERPL-MCNC: 36 MG/DL
LDLC SERPL CALC-MCNC: 71 MG/DL
NONHDLC SERPL-MCNC: 113 MG/DL
POTASSIUM SERPL-SCNC: 3.9 MMOL/L (ref 3.4–5.3)
SODIUM SERPL-SCNC: 144 MMOL/L (ref 133–144)
TRIGL SERPL-MCNC: 212 MG/DL

## 2019-09-23 PROCEDURE — 80061 LIPID PANEL: CPT | Performed by: FAMILY MEDICINE

## 2019-09-23 PROCEDURE — 90686 IIV4 VACC NO PRSV 0.5 ML IM: CPT | Performed by: FAMILY MEDICINE

## 2019-09-23 PROCEDURE — 99213 OFFICE O/P EST LOW 20 MIN: CPT | Mod: 25 | Performed by: FAMILY MEDICINE

## 2019-09-23 PROCEDURE — G0008 ADMIN INFLUENZA VIRUS VAC: HCPCS | Performed by: FAMILY MEDICINE

## 2019-09-23 PROCEDURE — 36415 COLL VENOUS BLD VENIPUNCTURE: CPT | Performed by: FAMILY MEDICINE

## 2019-09-23 PROCEDURE — 80048 BASIC METABOLIC PNL TOTAL CA: CPT | Performed by: FAMILY MEDICINE

## 2019-09-23 PROCEDURE — 84460 ALANINE AMINO (ALT) (SGPT): CPT | Performed by: FAMILY MEDICINE

## 2019-09-23 RX ORDER — LOSARTAN POTASSIUM AND HYDROCHLOROTHIAZIDE 25; 100 MG/1; MG/1
TABLET ORAL
Qty: 90 TABLET | Refills: 0 | Status: SHIPPED | OUTPATIENT
Start: 2019-09-23 | End: 2019-10-01 | Stop reason: DRUGHIGH

## 2019-09-23 RX ORDER — SIMVASTATIN 20 MG
20 TABLET ORAL EVERY EVENING
Qty: 90 TABLET | Refills: 1 | Status: SHIPPED | OUTPATIENT
Start: 2019-09-23 | End: 2020-03-13

## 2019-09-23 RX ORDER — METOPROLOL SUCCINATE 50 MG/1
50 TABLET, EXTENDED RELEASE ORAL DAILY
Qty: 90 TABLET | Refills: 1 | Status: SHIPPED | OUTPATIENT
Start: 2019-09-23 | End: 2020-04-17

## 2019-09-23 ASSESSMENT — PAIN SCALES - GENERAL: PAINLEVEL: NO PAIN (0)

## 2019-09-23 NOTE — PATIENT INSTRUCTIONS
At [facility], we strive to deliver an exceptional experience to you, every time we see you.  If you receive a survey in the mail, please send us back your thoughts. We really do value your feedback.    Based on your medical history, these are the current health maintenance/preventive care services that you are due for (some may have been done at this visit.)  Health Maintenance Due   Topic Date Due     INFLUENZA VACCINE (1) 09/01/2019     ZOSTER IMMUNIZATION (2 of 2) 05/20/2019         Suggested websites for health information:  [email or web address].[email or web address].org : Up to date and easily searchable information on multiple topics.  Www.medlineplus.gov : medication info, interactive tutorials, watch real surgeries online  Www.familydoctor.org : good info from the Academy of Family Physicians  Www.cdc.gov : public health info, travel advisories, epidemics (H1N1)  Www.aap.org : children's health info, normal development, vaccinations  Www.health.[facility].[place].us : [place] dept of health, public health issues in [place], N1N1    Your care team:                            Family Medicine Internal Medicine   MD Sánchez Miller MD Shantel Branch-Fleming, MD Katya Georgiev PA-C Nam Ho, MD Pediatrics   YOANDY Dockery, LEXIE Umaña APRN CNP   MD Radha Del Cid MD Deborah Mielke, MD Kim Thein, APRN CNP      Clinic hours: Monday - Thursday 7 am-7 pm; Fridays 7 am-5 pm.   Urgent care: Monday - Friday 11 am-9 pm; Saturday and Sunday 9 am-5 pm.  Pharmacy : Monday -Thursday 8 am-8 pm; Friday 8 am-6 pm; Saturday and Sunday 9 am-5 pm.     Clinic: [phone]   Pharmacy: [phone]

## 2019-09-23 NOTE — PROGRESS NOTES
"Subjective     Anita Bennett is a 59 year old female who presents to clinic today for the following health issues, she is accompanied by her  and , who interprets for the patient:    HPI   Hypertension Follow-up      Do you check your blood pressure regularly outside of the clinic? Yes     Are you following a low salt diet? Yes    Are your blood pressures ever more than 140 on the top number (systolic) OR more   than 90 on the bottom number (diastolic), for example 140/90? Yes      How many servings of fruits and vegetables do you eat daily?  2-3    On average, how many sweetened beverages do you drink each day (soda, juice, sweet tea, etc)?   0    How many days per week do you miss taking your medication? 0    She takes losartan-hydrochlorothiazide when her blood pressure is high, sometimes with months in between.    Past medical, family, and social histories, medications, and allergies are reviewed and updated in [facility].     Review of Systems   ROS COMP: Constitutional, HEENT, cardiovascular, pulmonary, gi and gu systems are negative, except as otherwise noted.      Any history above obtained by the Medical Assistant was reviewed by Dr. Mulugeta Sharma MD, and edited when necessary.    This document serves as a record of the services and decisions personally performed and made by Dr. Sharma. It was created on his behalf by Bobbi Johnson, a trained medical scribe. The creation of this document is based the provider's statements to the medical scribe.  Bobbi Johnson,  11:42 AM      Objective    /69 (BP Location: Left arm, Patient Position: Sitting, Cuff Size: Adult Regular)   Pulse 67   Temp 97.9  F (36.6  C) (Oral)   Resp 16   Ht 1.499 m (4' 11\")   LMP  (LMP Unknown)   SpO2 90%   Breastfeeding? No   BMI 26.26 kg/m    Body mass index is 26.26 kg/m .     Physical Exam   GENERAL: healthy, alert and no distress  EYES: Eyes grossly normal to inspection, PERRL, EOMI, sclerae white and " conjunctivae normal  RESP: lungs clear to auscultation - no crackles or wheezes, no areas of dullness, no tachypnea  CV: Heart regular rate and rhythm without murmur, click or rub. No peripheral edema and peripheral pulses strong  MS: no gross musculoskeletal defects noted, no edema  SKIN: no suspicious lesions or rashes to visible skin  NEURO: Normal strength and tone, sensory exam grossly normal, mentation intact, oriented times 3 and cranial nerves 2-12 intact  PSYCH: mentation appears normal, affect normal/bright     Assessment & Plan     (Z23) Need for immunization against influenza  (primary encounter diagnosis)  Comment: Influenza vaccine requested and given.   Plan: HC FLU VAC PRESRV FREE QUAD SPLIT VIR 3+YRS IM         [90607]            (I10) Essential hypertension with goal blood pressure less than 140/90  Comment: well-controlled  Plan: metoprolol succinate ER (TOPROL-XL) 50 MG 24 hr        tablet, losartan-hydrochlorothiazide (HYZAAR)         100-25 MG tablet, Basic metabolic panel        Return in about 6 months (around 3/14/2020) for full physical, blood pressure check, lab tests.      (Z86.73) History of CVA (cerebrovascular accident)  (Z13.6) CARDIOVASCULAR SCREENING; LDL GOAL LESS THAN 160  Comment: she is on a moderate-intensity statin   Plan: simvastatin (ZOCOR) 20 MG tablet, Lipid panel         reflex to direct LDL Non-fasting, ALT        Return in about 6 months (around 3/14/2020) for full physical, blood pressure check, lab tests.      The information in this document, created by the medical scribe for me, accurately reflects the services I personally performed and the decisions made by me. I have reviewed and approved this document for accuracy prior to leaving the patient care area.  Mulugeta Sharma MD

## 2019-09-24 RX ORDER — METOPROLOL SUCCINATE 50 MG/1
50 TABLET, EXTENDED RELEASE ORAL DAILY
Qty: 90 TABLET | Refills: 0 | OUTPATIENT
Start: 2019-09-24

## 2019-09-24 RX ORDER — SIMVASTATIN 20 MG
20 TABLET ORAL EVERY EVENING
Qty: 90 TABLET | Refills: 0 | OUTPATIENT
Start: 2019-09-24

## 2019-09-26 ENCOUNTER — TELEPHONE (OUTPATIENT)
Dept: FAMILY MEDICINE | Facility: CLINIC | Age: 59
End: 2019-09-26

## 2019-09-26 NOTE — TELEPHONE ENCOUNTER
losartan-hydrochlorothiazide (HYZAAR) 100-25 MG tablet is on back order.    Pharmacy requesting rx's for two separate scripts    Thank you

## 2019-09-27 ENCOUNTER — TELEPHONE (OUTPATIENT)
Dept: FAMILY MEDICINE | Facility: CLINIC | Age: 59
End: 2019-09-27

## 2019-09-27 DIAGNOSIS — I10 ESSENTIAL HYPERTENSION WITH GOAL BLOOD PRESSURE LESS THAN 140/90: ICD-10-CM

## 2019-09-27 RX ORDER — LOSARTAN POTASSIUM AND HYDROCHLOROTHIAZIDE 25; 100 MG/1; MG/1
TABLET ORAL
Qty: 90 TABLET | Refills: 0 | Status: CANCELLED | OUTPATIENT
Start: 2019-09-27

## 2019-09-27 NOTE — TELEPHONE ENCOUNTER
Patient asking for separate medications, not the combination form.     Routing to refill pool.   Danielle Park RN

## 2019-09-27 NOTE — TELEPHONE ENCOUNTER
Reason for Call:  Other prescription    Detailed comments: Out of med can you split as 2 RX one for Losartin and one for hydrochlorothiazide. Please call back and advise.    Phone Number can be reached at: [facility] [id number] [place] [facility]  [phone]    Best Time: any    Can we leave a detailed message on this number? YES    Call taken on 9/27/2019 at 2:28 PM by Lupe Butt

## 2019-09-27 NOTE — TELEPHONE ENCOUNTER
"Requested Prescriptions   Pending Prescriptions Disp Refills     losartan-hydrochlorothiazide (HYZAAR) 100-25 MG tablet 90 tablet 0     Sig: TAKE 1/2 TABLET BY MOUTH DAILY AS NEEDED FOR HIGH BLOOD PRESSURE.       Angiotensin-II Receptors Passed - 9/27/2019  2:46 PM        Passed - Last blood pressure under 140/90 in past 12 months     BP Readings from Last 3 Encounters:   09/23/19 118/68   03/25/19 109/69   09/21/18 128/60                 Passed - Recent (12 mo) or future (30 days) visit within the authorizing provider's specialty     Patient has had an office visit with the authorizing provider or a provider within the authorizing providers department within the previous 12 mos or has a future within next 30 days. See \"Patient Info\" tab in inbasket, or \"Choose Columns\" in Meds & Orders section of the refill encounter.              Passed - Medication is active on med list        Passed - Patient is age 18 or older        Passed - No active pregnancy on record        Passed - Normal serum creatinine on file in past 12 months     Recent Labs   Lab Test 09/23/19  1159   CR 0.93             Passed - Normal serum potassium on file in past 12 months     Recent Labs   Lab Test 09/23/19  1159   POTASSIUM 3.9                    Passed - No positive pregnancy test in past 12 months          "

## 2019-09-27 NOTE — TELEPHONE ENCOUNTER
Routing refill request to provider for review/approval because:  Patient asking for separate medications, not the combination form.  Danielle Park RN

## 2019-10-01 RX ORDER — HYDROCHLOROTHIAZIDE 12.5 MG/1
12.5 CAPSULE ORAL DAILY
Qty: 90 CAPSULE | Refills: 0 | Status: CANCELLED | OUTPATIENT
Start: 2019-10-01

## 2019-10-01 RX ORDER — LOSARTAN POTASSIUM 50 MG/1
50 TABLET ORAL DAILY PRN
Qty: 90 TABLET | Refills: 0 | Status: SHIPPED | OUTPATIENT
Start: 2019-10-01 | End: 2020-09-11

## 2019-10-01 RX ORDER — HYDROCHLOROTHIAZIDE 12.5 MG/1
12.5 TABLET ORAL DAILY PRN
Qty: 90 TABLET | Refills: 0 | Status: SHIPPED | OUTPATIENT
Start: 2019-10-01 | End: 2020-01-29

## 2019-10-01 RX ORDER — LOSARTAN POTASSIUM AND HYDROCHLOROTHIAZIDE 12.5; 5 MG/1; MG/1
1 TABLET ORAL DAILY PRN
Qty: 90 TABLET | Refills: 0 | Status: SHIPPED | OUTPATIENT
Start: 2019-10-01 | End: 2020-09-11

## 2019-10-01 NOTE — TELEPHONE ENCOUNTER
Please notify the patient that this prescription has been sent to their pharmacy.     Please instruct pharmacy to dispense the combo tablet (lower dose) if available, and if not available dispense the other two.

## 2019-10-03 ENCOUNTER — TRANSFERRED RECORDS (OUTPATIENT)
Dept: HEALTH INFORMATION MANAGEMENT | Facility: CLINIC | Age: 59
End: 2019-10-03

## 2019-10-04 NOTE — TELEPHONE ENCOUNTER
Called and verified with [facility] that patient picked up the separate medications to take together because the combination medication is currently out and pharmacy does not know when they will be able to get it back in due to  shortage.    Richard Morton CMA

## 2019-10-06 DIAGNOSIS — Z86.73 PERSONAL HISTORY OF TRANSIENT CEREBRAL ISCHEMIA: ICD-10-CM

## 2019-10-07 NOTE — TELEPHONE ENCOUNTER
"Requested Prescriptions   Pending Prescriptions Disp Refills     aspirin (ASA) 325 MG tablet [Pharmacy Med Name: ASPIRIN 325 MG TABLET]  Last Written Prescription Date:  07/01/19  Last Fill Quantity: 30,  # refills: 2   Last Office Visit with [facility] or [facility] prescribing provider:  09*23/19Nancy   Future Office Visit:    30 tablet 2     Sig: TAKE 1 TABLET (325 MG) BY MOUTH DAILY       Analgesics (Non-Narcotic Tylenol and ASA Only) Passed - 10/7/2019 10:36 AM        Passed - Recent (12 mo) or future (30 days) visit within the authorizing provider's specialty     Patient has had an office visit with the authorizing provider or a provider within the authorizing providers department within the previous 12 mos or has a future within next 30 days. See \"Patient Info\" tab in inbasket, or \"Choose Columns\" in Meds & Orders section of the refill encounter.              Passed - Patient is age 20 years or older     If ASA is flagged for ages under 20 years old. Forward to provider for confirmation Ryes Syndrome is not a concern.              Passed - Medication is active on med list          "

## 2019-10-09 RX ORDER — ASPIRIN 325 MG
325 TABLET ORAL DAILY
Qty: 30 TABLET | Refills: 5 | Status: SHIPPED | OUTPATIENT
Start: 2019-10-09 | End: 2020-03-24

## 2019-10-09 NOTE — TELEPHONE ENCOUNTER
Prescription approved per [facility] Refill Protocol.    Rosa Segovia RN

## 2019-10-11 ENCOUNTER — ANCILLARY PROCEDURE (OUTPATIENT)
Dept: MAMMOGRAPHY | Facility: CLINIC | Age: 59
End: 2019-10-11
Attending: FAMILY MEDICINE
Payer: MEDICARE

## 2019-10-11 DIAGNOSIS — Z12.31 VISIT FOR SCREENING MAMMOGRAM: ICD-10-CM

## 2019-10-11 PROCEDURE — 77067 SCR MAMMO BI INCL CAD: CPT | Performed by: RADIOLOGY

## 2020-01-16 ENCOUNTER — TRANSFERRED RECORDS (OUTPATIENT)
Dept: HEALTH INFORMATION MANAGEMENT | Facility: CLINIC | Age: 60
End: 2020-01-16

## 2020-01-25 DIAGNOSIS — I10 ESSENTIAL HYPERTENSION WITH GOAL BLOOD PRESSURE LESS THAN 140/90: ICD-10-CM

## 2020-01-25 NOTE — TELEPHONE ENCOUNTER
"Requested Prescriptions   Pending Prescriptions Disp Refills     hydrochlorothiazide (HYDRODIURIL) 12.5 MG tablet [Pharmacy Med Name: HYDROCHLOROTHIAZIDE 12.5 MG TB]  Last Written Prescription Date:  10/1/19  Last Fill Quantity: 90,  # refills: 0   Last Office Visit with [facility], [facility] or [facility] prescribing provider:  9/23/19   Future Office Visit:    Next 5 appointments (look out 90 days)    Mar 16, 2020 11:20 AM CDT  PHYSICAL with Mulugeta Sharma MD  [facility] [address] [place] [id number] [phone]          90 tablet 0     Sig: TAKE 1 TABLET (12.5 MG) BY MOUTH DAILY AS NEEDED FOR BLOOD PRESSURE.       Diuretics (Including Combos) Protocol Passed - 1/25/2020 11:47 AM        Passed - Blood pressure under 140/90 in past 12 months     BP Readings from Last 3 Encounters:   09/23/19 118/68   03/25/19 109/69   09/21/18 128/60                 Passed - Recent (12 mo) or future (30 days) visit within the authorizing provider's specialty     Patient has had an office visit with the authorizing provider or a provider within the authorizing providers department within the previous 12 mos or has a future within next 30 days. See \"Patient Info\" tab in inbasket, or \"Choose Columns\" in Meds & Orders section of the refill encounter.              Passed - Medication is active on med list        Passed - Patient is age 18 or older        Passed - No active pregancy on record        Passed - Normal serum creatinine on file in past 12 months     Recent Labs   Lab Test 09/23/19  1159   CR 0.93              Passed - Normal serum potassium on file in past 12 months     Recent Labs   Lab Test 09/23/19  1159   POTASSIUM 3.9                    Passed - Normal serum sodium on file in past 12 months     Recent Labs   Lab Test 09/23/19  1159                 Passed - No positive pregnancy test in past 12 months          "

## 2020-01-29 RX ORDER — HYDROCHLOROTHIAZIDE 12.5 MG/1
12.5 TABLET ORAL DAILY PRN
Qty: 90 TABLET | Refills: 1 | Status: SHIPPED | OUTPATIENT
Start: 2020-01-29 | End: 2020-07-17

## 2020-01-29 NOTE — TELEPHONE ENCOUNTER
Prescription approved per [facility] Refill Protocol.      Marichuy Neely RN  [facility]

## 2020-02-29 DIAGNOSIS — Z53.9 DIAGNOSIS NOT YET DEFINED: ICD-10-CM

## 2020-02-29 NOTE — TELEPHONE ENCOUNTER
"Requested Prescriptions   Pending Prescriptions Disp Refills     SENNA-PLUS 8.6-50 MG tablet [Pharmacy Med Name: SENNA PLUS TABLET] 30 tablet 5     Sig: TAKE 1 TABLET BY MOUTH DAILY AS NEEDED FOR CONSTIPATION           Last Written Prescription Date:  8/30/19  Last Fill Quantity: 90,  # refills: 1   Last Office Visit with [facility], [facility] or [facility] prescribing provider:  9/23/19   Future Office Visit:    Next 5 appointments (look out 90 days)    Mar 16, 2020 11:20 AM CDT  PHYSICAL with Mulugeta Sharma MD  [facility] ([facility]) [address] [place] [id number]  [phone]             Laxatives Protocol Passed - 2/29/2020 12:05 PM        Passed - Patient is age 6 or older        Passed - Recent (12 mo) or future (30 days) visit within the authorizing provider's specialty     Patient has had an office visit with the authorizing provider or a provider within the authorizing providers department within the previous 12 mos or has a future within next 30 days. See \"Patient Info\" tab in inbasket, or \"Choose Columns\" in Meds & Orders section of the refill encounter.              Passed - Medication is active on med list              Lawson Faarax  [facility]  "

## 2020-03-03 RX ORDER — SENNOSIDES AND DOCUSATE SODIUM 8.6; 5 MG/1; MG/1
TABLET ORAL
Qty: 90 TABLET | Refills: 1 | Status: SHIPPED | OUTPATIENT
Start: 2020-03-03 | End: 2020-09-14

## 2020-03-03 NOTE — TELEPHONE ENCOUNTER
Prescription approved per [facility] Refill Protocol.    Karlene Zuleta RN, [facility] Triage

## 2020-03-11 DIAGNOSIS — Z86.73 HISTORY OF CVA (CEREBROVASCULAR ACCIDENT): ICD-10-CM

## 2020-03-11 DIAGNOSIS — Z13.6 CARDIOVASCULAR SCREENING; LDL GOAL LESS THAN 160: ICD-10-CM

## 2020-03-11 NOTE — TELEPHONE ENCOUNTER
"Requested Prescriptions   Pending Prescriptions Disp Refills     simvastatin (ZOCOR) 20 MG tablet [Pharmacy Med Name: SIMVASTATIN 20 MG TABLET] 90 tablet 1     Sig: TAKE ONE TABLET BY MOUTH IN THE EVENING FOR CHOLESTEROL.       Statins Protocol Passed - 3/11/2020  8:58 AM        Passed - LDL on file in past 12 months     Recent Labs   Lab Test 09/23/19  1159   LDL 71             Passed - No abnormal creatine kinase in past 12 months     No lab results found.             Passed - Recent (12 mo) or future (30 days) visit within the authorizing provider's specialty     Patient has had an office visit with the authorizing provider or a provider within the authorizing providers department within the previous 12 mos or has a future within next 30 days. See \"Patient Info\" tab in inbasket, or \"Choose Columns\" in Meds & Orders section of the refill encounter.              Passed - Medication is active on med list        Passed - Patient is age 18 or older        Passed - No active pregnancy on record        Passed - No positive pregnancy test in past 12 months           Last Written Prescription Date:  9/23/19  Last Fill Quantity: 90,  # refills: 1   Last office visit: 9/23/2019 with prescribing provider:  Mulugeta Sharma     Future Office Visit:   Next 5 appointments (look out 90 days)    Mar 16, 2020 11:20 AM CDT  PHYSICAL with Muulgeta Sharma MD  [facility] [address] [place] [id number] [phone]           "

## 2020-03-13 RX ORDER — SIMVASTATIN 20 MG
TABLET ORAL
Qty: 90 TABLET | Refills: 1 | Status: SHIPPED | OUTPATIENT
Start: 2020-03-13 | End: 2020-09-11

## 2020-03-13 NOTE — TELEPHONE ENCOUNTER
Prescription approved per [facility] Refill Protocol.    Karlene Zuleta RN, [facility] Triage

## 2020-03-19 ENCOUNTER — APPOINTMENT (OUTPATIENT)
Dept: INTERPRETER SERVICES | Facility: CLINIC | Age: 60
End: 2020-03-19
Payer: MEDICARE

## 2020-03-23 ENCOUNTER — MEDICAL CORRESPONDENCE (OUTPATIENT)
Dept: HEALTH INFORMATION MANAGEMENT | Facility: CLINIC | Age: 60
End: 2020-03-23

## 2020-03-24 DIAGNOSIS — Z86.73 PERSONAL HISTORY OF TRANSIENT CEREBRAL ISCHEMIA: ICD-10-CM

## 2020-03-24 RX ORDER — ASPIRIN 325 MG
325 TABLET ORAL DAILY
Qty: 30 TABLET | Refills: 3 | Status: SHIPPED | OUTPATIENT
Start: 2020-03-24 | End: 2020-08-04

## 2020-03-24 NOTE — TELEPHONE ENCOUNTER
Prescription approved per [facility] Refill Protocol.  Danielle Park RN  [facility]

## 2020-03-24 NOTE — TELEPHONE ENCOUNTER
"Requested Prescriptions   Pending Prescriptions Disp Refills     aspirin (ASA) 325 MG tablet 30 tablet 5     Sig: Take 1 tablet (325 mg) by mouth daily       Analgesics (Non-Narcotic Tylenol and ASA Only) Passed - 3/24/2020  3:51 PM        Passed - Recent (12 mo) or future (30 days) visit within the authorizing provider's specialty     Patient has had an office visit with the authorizing provider or a provider within the authorizing providers department within the previous 12 mos or has a future within next 30 days. See \"Patient Info\" tab in inbasket, or \"Choose Columns\" in Meds & Orders section of the refill encounter.              Passed - Patient is age 20 years or older     If ASA is flagged for ages under 20 years old. Forward to provider for confirmation Ryes Syndrome is not a concern.              Passed - Medication is active on med list           Last Written Prescription Date: 10/9/19   Last Fill Quantity: 30,  # refills: 5   Last office visit: 9/23/2019 with prescribing provider:  Marco   Future Office Visit:      "

## 2020-03-25 ENCOUNTER — TELEPHONE (OUTPATIENT)
Dept: OPTOMETRY | Facility: CLINIC | Age: 60
End: 2020-03-25

## 2020-03-25 DIAGNOSIS — H10.13 ALLERGIC CONJUNCTIVITIS, BILATERAL: ICD-10-CM

## 2020-03-25 NOTE — TELEPHONE ENCOUNTER
[facility] called to check status on this medicaton - I do not see a refill request in the chart. Told them I would put the refill request in for this medication.

## 2020-03-26 RX ORDER — AZELASTINE HYDROCHLORIDE 0.5 MG/ML
1 SOLUTION/ DROPS OPHTHALMIC 2 TIMES DAILY PRN
Qty: 5 ML | Refills: 11 | Status: SHIPPED | OUTPATIENT
Start: 2020-03-26 | End: 2022-08-09

## 2020-03-28 DIAGNOSIS — K21.9 GASTROESOPHAGEAL REFLUX DISEASE WITHOUT ESOPHAGITIS: ICD-10-CM

## 2020-03-28 NOTE — TELEPHONE ENCOUNTER
"Requested Prescriptions   Pending Prescriptions Disp Refills     pantoprazole (PROTONIX) 40 MG EC tablet [Pharmacy Med Name: PANTOPRAZOLE SOD DR 40 MG TAB] 90 tablet 2     Sig: TAKE 1 TABLET (40 MG) BY MOUTH DAILY FOR REFLUX.       PPI Protocol Passed - 3/28/2020 12:35 PM        Passed - Not on Clopidogrel (unless Pantoprazole ordered)        Passed - No diagnosis of osteoporosis on record        Passed - Recent (12 mo) or future (30 days) visit within the authorizing provider's specialty     Patient has had an office visit with the authorizing provider or a provider within the authorizing providers department within the previous 12 mos or has a future within next 30 days. See \"Patient Info\" tab in inbasket, or \"Choose Columns\" in Meds & Orders section of the refill encounter.              Passed - Medication is active on med list        Passed - Patient is age 18 or older        Passed - No active pregnacy on record        Passed - No positive pregnancy test in past 12 months           Last Written Prescription Date:  03/25/2019  Last Fill Quantity: 90,  # refills: 3   Last office visit: 9/23/2019 with prescribing provider:     Future Office Visit:      "

## 2020-03-30 RX ORDER — PANTOPRAZOLE SODIUM 40 MG/1
40 TABLET, DELAYED RELEASE ORAL DAILY
Qty: 90 TABLET | Refills: 1 | Status: SHIPPED | OUTPATIENT
Start: 2020-03-30 | End: 2020-09-11

## 2020-03-30 NOTE — TELEPHONE ENCOUNTER
Prescription approved per [facility] Refill Protocol.        Eliu Singer RN, BSN, PHN

## 2020-04-14 DIAGNOSIS — I10 ESSENTIAL HYPERTENSION WITH GOAL BLOOD PRESSURE LESS THAN 140/90: ICD-10-CM

## 2020-04-14 NOTE — TELEPHONE ENCOUNTER
"Requested Prescriptions   Pending Prescriptions Disp Refills     metoprolol succinate ER (TOPROL-XL) 50 MG 24 hr tablet [Pharmacy Med Name: METOPROLOL SUCC ER 50 MG TAB]  Last Written Prescription Date:  9/23/19  Last Fill Quantity: 90 tablet,  # refills: 1   Last office visit: 9/23/2019 with prescribing provider:  Mulugeta hSarma MD     Future Office Visit:     90 tablet 1     Sig: TAKE 1 TABLET BY MOUTH EVERY DAY       Beta-Blockers Protocol Passed - 4/14/2020  6:25 PM        Passed - Blood pressure under 140/90 in past 12 months     BP Readings from Last 3 Encounters:   09/23/19 118/68   03/25/19 109/69   09/21/18 128/60                 Passed - Patient is age 6 or older        Passed - Recent (12 mo) or future (30 days) visit within the authorizing provider's specialty     Patient has had an office visit with the authorizing provider or a provider within the authorizing providers department within the previous 12 mos or has a future within next 30 days. See \"Patient Info\" tab in inbasket, or \"Choose Columns\" in Meds & Orders section of the refill encounter.              Passed - Medication is active on med list             "

## 2020-04-17 RX ORDER — METOPROLOL SUCCINATE 50 MG/1
TABLET, EXTENDED RELEASE ORAL
Qty: 90 TABLET | Refills: 1 | Status: SHIPPED | OUTPATIENT
Start: 2020-04-17 | End: 2020-09-11

## 2020-04-17 NOTE — TELEPHONE ENCOUNTER
Prescription approved per [facility] Refill Protocol.      Eliu Singer RN, BSN, PHN

## 2020-06-25 ENCOUNTER — TELEPHONE (OUTPATIENT)
Dept: FAMILY MEDICINE | Facility: CLINIC | Age: 60
End: 2020-06-25

## 2020-06-25 DIAGNOSIS — N39.46 MIXED INCONTINENCE: Primary | ICD-10-CM

## 2020-06-25 NOTE — TELEPHONE ENCOUNTER
This writer attempted to contact care coordinator Medicare on 06/25/20    Reason for call glove DME request-need more information and left detailed message.    When patient calls back, please contact 1st floor Jade Palacios. routine priority.        Richard Morton

## 2020-06-25 NOTE — TELEPHONE ENCOUNTER
Gloves are needed for home care after stroke    Send to [facility] medical    Fax order to Mar[place] at [phone]

## 2020-06-25 NOTE — TELEPHONE ENCOUNTER
Reason for Call:  Other prescription    Detailed comments: Pt's Medicare Care Coordinator calling and would like to put in a medication request for size large gloves to be faxed to her  fax # [phone]     Phone Number Medicare Care Coordinator can be reached at: Other phone number:  [phone]    Best Time: anytime    Can we leave a detailed message on this number? YES    Call taken on 6/25/2020 at 9:35 AM by Patrick Muñiz

## 2020-06-26 NOTE — TELEPHONE ENCOUNTER
DME order completed. Please sign with my signature stamp and send to supply company.  Cristine Lieberman MD

## 2020-07-09 DIAGNOSIS — Z53.9 DIAGNOSIS NOT YET DEFINED: Primary | ICD-10-CM

## 2020-07-09 PROBLEM — M47.817 LUMBOSACRAL SPONDYLOSIS WITHOUT MYELOPATHY: Status: ACTIVE | Noted: 2019-07-01

## 2020-07-09 PROBLEM — M43.6 TORTICOLLIS: Status: ACTIVE | Noted: 2019-03-28

## 2020-07-09 PROBLEM — M43.02 CERVICAL SPONDYLOLYSIS: Status: ACTIVE | Noted: 2019-07-30

## 2020-07-09 PROBLEM — R26.9 GAIT ABNORMALITY: Status: ACTIVE | Noted: 2019-03-28

## 2020-07-09 PROCEDURE — G0179 MD RECERTIFICATION HHA PT: HCPCS | Performed by: FAMILY MEDICINE

## 2020-07-12 DIAGNOSIS — E55.9 VITAMIN D DEFICIENCY: ICD-10-CM

## 2020-07-15 RX ORDER — CHOLECALCIFEROL (VITAMIN D3) 25 MCG
TABLET ORAL
Qty: 30 TABLET | Refills: 1 | Status: SHIPPED | OUTPATIENT
Start: 2020-07-15 | End: 2020-10-30

## 2020-07-15 NOTE — TELEPHONE ENCOUNTER
Prescription approved per [facility] Refill Protocol.    Karlene Zuleta RN, [facility] Triage

## 2020-07-16 DIAGNOSIS — I10 ESSENTIAL HYPERTENSION WITH GOAL BLOOD PRESSURE LESS THAN 140/90: ICD-10-CM

## 2020-07-17 RX ORDER — HYDROCHLOROTHIAZIDE 12.5 MG/1
12.5 TABLET ORAL DAILY PRN
Qty: 90 TABLET | Refills: 0 | Status: SHIPPED | OUTPATIENT
Start: 2020-07-17 | End: 2020-09-11

## 2020-07-29 PROBLEM — R39.81 URINARY INCONTINENCE DUE TO IMMOBILITY: Status: ACTIVE | Noted: 2020-07-29

## 2020-09-10 DIAGNOSIS — Z53.9 DIAGNOSIS NOT YET DEFINED: ICD-10-CM

## 2020-09-11 ENCOUNTER — OFFICE VISIT (OUTPATIENT)
Dept: FAMILY MEDICINE | Facility: CLINIC | Age: 60
End: 2020-09-11
Payer: MEDICARE

## 2020-09-11 VITALS
OXYGEN SATURATION: 98 % | RESPIRATION RATE: 16 BRPM | SYSTOLIC BLOOD PRESSURE: 134 MMHG | BODY MASS INDEX: 26.26 KG/M2 | HEIGHT: 59 IN | DIASTOLIC BLOOD PRESSURE: 83 MMHG | HEART RATE: 77 BPM

## 2020-09-11 DIAGNOSIS — Z23 NEED FOR PROPHYLACTIC VACCINATION AND INOCULATION AGAINST INFLUENZA: ICD-10-CM

## 2020-09-11 DIAGNOSIS — Z12.31 ENCOUNTER FOR SCREENING MAMMOGRAM FOR BREAST CANCER: ICD-10-CM

## 2020-09-11 DIAGNOSIS — I10 ESSENTIAL HYPERTENSION WITH GOAL BLOOD PRESSURE LESS THAN 140/90: ICD-10-CM

## 2020-09-11 DIAGNOSIS — Z23 NEED FOR INFLUENZA VACCINATION: ICD-10-CM

## 2020-09-11 DIAGNOSIS — Z00.00 ENCOUNTER FOR MEDICARE ANNUAL WELLNESS EXAM: Primary | ICD-10-CM

## 2020-09-11 DIAGNOSIS — Z86.73 HISTORY OF CVA (CEREBROVASCULAR ACCIDENT): ICD-10-CM

## 2020-09-11 DIAGNOSIS — K21.9 GASTROESOPHAGEAL REFLUX DISEASE WITHOUT ESOPHAGITIS: ICD-10-CM

## 2020-09-11 LAB
ALT SERPL W P-5'-P-CCNC: 30 U/L (ref 0–50)
ANION GAP SERPL CALCULATED.3IONS-SCNC: 5 MMOL/L (ref 3–14)
BUN SERPL-MCNC: 11 MG/DL (ref 7–30)
CALCIUM SERPL-MCNC: 9 MG/DL (ref 8.5–10.1)
CHLORIDE SERPL-SCNC: 109 MMOL/L (ref 94–109)
CHOLEST SERPL-MCNC: 146 MG/DL
CO2 SERPL-SCNC: 29 MMOL/L (ref 20–32)
CREAT SERPL-MCNC: 0.98 MG/DL (ref 0.52–1.04)
GFR SERPL CREATININE-BSD FRML MDRD: 63 ML/MIN/{1.73_M2}
GLUCOSE SERPL-MCNC: 87 MG/DL (ref 70–99)
HDLC SERPL-MCNC: 39 MG/DL
LDLC SERPL CALC-MCNC: 72 MG/DL
NONHDLC SERPL-MCNC: 107 MG/DL
POTASSIUM SERPL-SCNC: 3.9 MMOL/L (ref 3.4–5.3)
SODIUM SERPL-SCNC: 143 MMOL/L (ref 133–144)
TRIGL SERPL-MCNC: 174 MG/DL

## 2020-09-11 PROCEDURE — 80048 BASIC METABOLIC PNL TOTAL CA: CPT | Performed by: FAMILY MEDICINE

## 2020-09-11 PROCEDURE — G0008 ADMIN INFLUENZA VIRUS VAC: HCPCS | Performed by: FAMILY MEDICINE

## 2020-09-11 PROCEDURE — 80061 LIPID PANEL: CPT | Performed by: FAMILY MEDICINE

## 2020-09-11 PROCEDURE — G0439 PPPS, SUBSEQ VISIT: HCPCS | Performed by: FAMILY MEDICINE

## 2020-09-11 PROCEDURE — 99214 OFFICE O/P EST MOD 30 MIN: CPT | Mod: 25 | Performed by: FAMILY MEDICINE

## 2020-09-11 PROCEDURE — 84460 ALANINE AMINO (ALT) (SGPT): CPT | Performed by: FAMILY MEDICINE

## 2020-09-11 PROCEDURE — 90686 IIV4 VACC NO PRSV 0.5 ML IM: CPT | Performed by: FAMILY MEDICINE

## 2020-09-11 PROCEDURE — 36415 COLL VENOUS BLD VENIPUNCTURE: CPT | Performed by: FAMILY MEDICINE

## 2020-09-11 RX ORDER — BACLOFEN 20 MG/1
TABLET ORAL
COMMUNITY
Start: 2020-08-28 | End: 2020-11-24

## 2020-09-11 RX ORDER — LOSARTAN POTASSIUM 50 MG/1
50 TABLET ORAL DAILY PRN
Qty: 90 TABLET | Refills: 0 | Status: SHIPPED | OUTPATIENT
Start: 2020-09-11 | End: 2020-12-08

## 2020-09-11 RX ORDER — HYDROCHLOROTHIAZIDE 12.5 MG/1
12.5 TABLET ORAL DAILY PRN
Qty: 90 TABLET | Refills: 0 | Status: SHIPPED | OUTPATIENT
Start: 2020-09-11 | End: 2020-12-11

## 2020-09-11 RX ORDER — SIMVASTATIN 20 MG
20 TABLET ORAL EVERY EVENING
Qty: 90 TABLET | Refills: 1 | Status: SHIPPED | OUTPATIENT
Start: 2020-09-11 | End: 2021-02-09

## 2020-09-11 RX ORDER — METOPROLOL SUCCINATE 50 MG/1
50 TABLET, EXTENDED RELEASE ORAL DAILY
Qty: 90 TABLET | Refills: 1 | Status: SHIPPED | OUTPATIENT
Start: 2020-09-11 | End: 2021-03-11

## 2020-09-11 RX ORDER — PANTOPRAZOLE SODIUM 40 MG/1
40 TABLET, DELAYED RELEASE ORAL DAILY
Qty: 90 TABLET | Refills: 1 | Status: SHIPPED | OUTPATIENT
Start: 2020-09-11 | End: 2021-03-11

## 2020-09-11 ASSESSMENT — PAIN SCALES - GENERAL: PAINLEVEL: SEVERE PAIN (6)

## 2020-09-11 NOTE — PROGRESS NOTES
"  SUBJECTIVE:   Anita Bennett is a 60 year old female who presents for Preventive Visit.    Are you in the first 12 months of your Medicare Part B coverage?  No    Physical Health:    In general, how would you rate your overall physical health? good    Outside of work, how many days during the week do you exercise? 1 day/week    Outside of work, approximately how many minutes a day do you exercise?15-30 minutes    If you drink alcohol do you typically have >3 drinks per day or >7 drinks per week? No    Do you usually eat at least 4 servings of fruit and vegetables a day, include whole grains & fiber and avoid regularly eating high fat or \"junk\" foods? Yes    Do you have any problems taking medications regularly?  No    Do you have any side effects from medications? none    Needs assistance for the following daily activities: no assistance needed    Which of the following safety concerns are present in your home?  none identified     Hearing impairment: No    In the past 6 months, have you been bothered by leaking of urine? no    Mental Health:    In general, how would you rate your overall mental or emotional health? good  PHQ-2 Score:      Do you feel safe in your environment? Yes    Have you ever done Advance Care Planning? (For example, a Health Directive, POLST, or a discussion with a medical provider or your loved ones about your wishes): No, advance care planning information given to patient to review.  Patient plans to discuss their wishes with loved ones or provider.      Additional concerns to address?  No    Fall risk:  Fallen 2 or more times in the past year?: No  Any fall with injury in the past year?: No    Cognitive Screenin) Repeat 3 items (Leader, Season, Table)    2) Clock draw: NORMAL  3) 3 item recall: Recalls NO objects   Results: normal clock, unable to recall     Mini-CogTM Copyright BERNY Villela. Licensed by the author for use in [facility]; reprinted with permission (luciano@.[email or web address]). " All rights reserved.      Do you have sleep apnea, excessive snoring or daytime drowsiness?: no      Hypertension Follow-up      Do you check your blood pressure regularly outside of the clinic? Yes     Are you following a low salt diet? Yes    Are your blood pressures ever more than 140 on the top number (systolic) OR more   than 90 on the bottom number (diastolic), for example 140/90? Yes If so adds 1/2 of Hyzaar 100/25 tablet (components are divided, not sure how  does it now, it is unclear during our discussion)        Social History     Tobacco Use     Smoking status: Never Smoker     Smokeless tobacco: Never Used   Substance Use Topics     Alcohol use: Yes                           Current providers sharing in care for this patient include:   Patient Care Team:  Mulugeta Sharma MD as PCP - General (Family Practice)  Mulugeta Sharma MD as Assigned PCP    The following health maintenance items are reviewed in Epic and correct as of today:  Health Maintenance   Topic Date Due     PHQ-2  01/01/2020     MEDICARE ANNUAL WELLNESS VISIT  03/25/2020     EYE EXAM  05/06/2020     INFLUENZA VACCINE (1) 09/01/2020     ADVANCE CARE PLANNING  09/30/2021     MAMMO SCREENING  10/11/2021     DTAP/TDAP/TD IMMUNIZATION (3 - Td) 04/09/2022     COLORECTAL CANCER SCREENING  05/22/2022     HPV TEST  03/25/2024     PAP  03/25/2024     LIPID  09/23/2024     HEPATITIS C SCREENING  Completed     HIV SCREENING  Completed     ZOSTER IMMUNIZATION  Completed     IPV IMMUNIZATION  Aged Out     MENINGITIS IMMUNIZATION  Aged Out     HEPATITIS B IMMUNIZATION  Aged Out     Past medical, family, and social histories, medications, and allergies are reviewed and updated in Epic.     ROS:  Constitutional, HEENT, cardiovascular, pulmonary, GI, , musculoskeletal, neuro, skin, endocrine and psych systems are negative, except as otherwise noted.    OBJECTIVE:   /83 (BP Location: Left arm, Patient Position: Chair, Cuff Size: Adult  "Regular)   Pulse 77   Resp 16   Ht 1.499 m (4' 11\")   LMP  (LMP Unknown)   SpO2 98%   BMI 26.26 kg/m   Estimated body mass index is 26.26 kg/m  as calculated from the following:    Height as of this encounter: 1.499 m (4' 11\").    Weight as of 9/12/17: 59 kg (130 lb).  EXAM:   GENERAL APPEARANCE: healthy, alert and no distress  EYES: Eyes grossly normal to inspection, PERRL and conjunctivae and sclerae normal  HENT: ear canals and TM's normal, nose and mouth without ulcers or lesions, oropharynx clear and oral mucous membranes moist  NECK: no adenopathy, no asymmetry, masses, or scars and thyroid normal to palpation  RESP: lungs clear to auscultation - no rales, rhonchi or wheezes  BREAST: normal without masses, tenderness or nipple discharge and no palpable axillary masses or adenopathy  CV: regular rate and rhythm, normal S1 S2, no S3 or S4, no murmur, click or rub, no peripheral edema and peripheral pulses strong  ABDOMEN: soft, nontender, no hepatosplenomegaly, no masses and bowel sounds normal  MS: As usual, the patient is in a wheelchair due to loss of function of the right upper extremity and right lower extremity  SKIN: no suspicious lesions or rashes  NEURO: Cranial nerves II through XII intact, speech is affected (aphasia), but she does speak a little bit.  Right-sided flaccid hemiplegia (during the visit, the patient pauses to do right upper extremity passive range of motion stretching using her left upper extremity)  PSYCH: mentation appears normal and affect normal/bright      Diagnostic Test Results:  Labs reviewed in Epic    ASSESSMENT / PLAN:   (Z00.00) Encounter for Medicare annual wellness exam  (primary encounter diagnosis)  Comment:   Plan:  C RIV4 (FLUBLOK) VACCINE RECOMBINANT         DNA PRSRV ANTIBIO FREE, IM [15543]        Follow-up in 1 year    (Z86.73) History of CVA (cerebrovascular accident)  Comment:   Plan: simvastatin (ZOCOR) 20 MG tablet, Lipid panel         reflex to direct LDL " "Non-fasting, ALT         Return in about 6 months (around 3/11/2021) for cholesterol, blood pressure check.      (I10) Essential hypertension with goal blood pressure less than 140/90  Comment: Well-controlled on her current regimen  Plan: hydrochlorothiazide (HYDRODIURIL) 12.5 MG         tablet, losartan (COZAAR) 50 MG tablet,         metoprolol succinate ER (TOPROL-XL) 50 MG 24 hr        tablet, Lipid panel reflex to direct LDL         Non-fasting, Basic metabolic panel         Return in about 6 months (around 3/11/2021) for cholesterol, blood pressure check.      (K21.9) Gastroesophageal reflux disease without esophagitis  Comment: Refill request  Plan: pantoprazole (PROTONIX) 40 MG EC tablet          (Z12.31) Encounter for screening mammogram for breast cancer  Comment:   Plan: *MA Screening Digital Bilateral            (Z23) Need for influenza vaccination  Comment: Influenza vaccine offered and accepted by patient. She has received it before without problems.   Plan: ADMIN INFLUENZA VIRUS VAC, INFLUENZA VACCINE IM > 6 MONTHS VALENT IIV4 [41920], CANCELED: C RIV4         (FLUBLOK) VACCINE RECOMBINANT DNA PRSRV ANTIBIO        FREE, IM [[id number]]            COUNSELING:  Reviewed preventive health counseling, as reflected in patient instructions  Special attention given to:       Immunizations    Vaccinated for: Influenza          Estimated body mass index is 26.26 kg/m  as calculated from the following:    Height as of this encounter: 1.499 m (4' 11\").    Weight as of 9/12/17: 59 kg (130 lb).        She reports that she has never smoked. She has never used smokeless tobacco.    Appropriate preventive services were discussed with this patient, including applicable screening as appropriate for cardiovascular disease, diabetes, osteopenia/osteoporosis, and glaucoma.  As appropriate for age/gender, discussed screening for colorectal cancer, prostate cancer, breast cancer, and cervical cancer. Checklist reviewing " preventive services available has been given to the patient.    Reviewed patients plan of care and provided an AVS. The Basic Care Plan (routine screening as documented in Health Maintenance) for Anita meets the Care Plan requirement. This Care Plan has been established and reviewed with the Patient and spouse.    Counseling Resources:  ATP IV Guidelines  Pooled Cohorts Equation Calculator  Breast Cancer Risk Calculator  BRCA-Related Cancer Risk Assessment: FHS-7 Tool  FRAX Risk Assessment  ICSI Preventive Guidelines  Dietary Guidelines for Americans, 2010  USDA's MyPlate  ASA Prophylaxis  Lung CA Screening    Mulugeta Sharma MD  [facility]

## 2020-09-11 NOTE — PATIENT INSTRUCTIONS
Patient Education   Personalized Prevention Plan  You are due for the preventive services outlined below.  Your care team is available to assist you in scheduling these services.  If you have already completed any of these items, please share that information with your care team to update in your medical record.  Health Maintenance Due   Topic Date Due     PHQ-2  01/01/2020     Annual Wellness Visit  03/25/2020     Eye Exam  05/06/2020     Flu Vaccine (1) 09/01/2020       At [facility], we strive to deliver an exceptional experience to you, every time we see you. If you receive a survey, please complete it as we do value your feedback.  If you have MyChart, you can expect to receive results automatically within 24 hours of their completion.  Your provider will send a note interpreting your results as well.   If you do not have MyChart, you should receive your results in about a week by mail.    Your care team:                            Family Medicine Internal Medicine   MD Sánchez Miller, MD Tamra Mauricio, MD Storm Archibald, MD Sneha Teague PA-C  Lana Kwok, APRN CNP    Gregory Osborne, MD Pediatrics   Yuri Lara, PA-CLINT Hanna, CNP MD Marleny Sharma APRN CNP   MD Radha Del Cid MD Deborah Mielke, MD Kim Thein, APRN CNP  Penelope Crow, PA-CLINT Rachel, CNP  MD Roseline Delarosa MD Angela Wermerskirchen, MD      Clinic hours: Monday - Thursday 7 am-7 pm; Fridays 7 am-5 pm.   Urgent care: Monday - Friday 11 am-9 pm; Saturday and Sunday 9 am-5 pm.    Clinic: [phone]       [facility]: Monday - Thursday 8 am - 7 pm; Friday 8 am - 6 pm  [facility]: [phone]     Use [email or web address] for 24/7 diagnosis and treatment of dozens of conditions.      Please call [facility]: [phone] to schedule your mammogram  for around 10/11/20.

## 2020-09-14 RX ORDER — DOCUSATE SODIUM 50MG AND SENNOSIDES 8.6MG 8.6; 5 MG/1; MG/1
TABLET, FILM COATED ORAL
Qty: 30 TABLET | Refills: 11 | Status: SHIPPED | OUTPATIENT
Start: 2020-09-14 | End: 2021-10-19

## 2020-09-14 NOTE — TELEPHONE ENCOUNTER
Prescription approved per [facility] Refill Protocol.      Eliu Singer RN, BSN, PHN

## 2020-09-30 DIAGNOSIS — G81.11 RIGHT SPASTIC HEMIPARESIS (H): Primary | ICD-10-CM

## 2020-10-12 ENCOUNTER — OFFICE VISIT (OUTPATIENT)
Dept: PHYSICAL MEDICINE AND REHAB | Facility: CLINIC | Age: 60
End: 2020-10-12
Payer: MEDICARE

## 2020-10-12 VITALS
DIASTOLIC BLOOD PRESSURE: 83 MMHG | HEART RATE: 67 BPM | OXYGEN SATURATION: 96 % | TEMPERATURE: 97.8 F | SYSTOLIC BLOOD PRESSURE: 138 MMHG | RESPIRATION RATE: 16 BRPM

## 2020-10-12 DIAGNOSIS — I69.951 SPASTIC HEMIPLEGIA OF RIGHT DOMINANT SIDE AS LATE EFFECT OF CEREBROVASCULAR DISEASE, UNSPECIFIED CEREBROVASCULAR DISEASE TYPE (H): Primary | ICD-10-CM

## 2020-10-12 DIAGNOSIS — M43.6 TORTICOLLIS: ICD-10-CM

## 2020-10-12 DIAGNOSIS — R26.9 ABNORMAL GAIT: ICD-10-CM

## 2020-10-12 DIAGNOSIS — M62.838 SPASM OF MUSCLE: ICD-10-CM

## 2020-10-12 PROCEDURE — 95874 GUIDE NERV DESTR NEEDLE EMG: CPT | Performed by: PHYSICAL MEDICINE & REHABILITATION

## 2020-10-12 PROCEDURE — 64645 CHEMODENERV 1 EXTREM 5/> EA: CPT | Performed by: PHYSICAL MEDICINE & REHABILITATION

## 2020-10-12 PROCEDURE — 64644 CHEMODENERV 1 EXTREM 5/> MUS: CPT | Performed by: PHYSICAL MEDICINE & REHABILITATION

## 2020-10-12 PROCEDURE — 99214 OFFICE O/P EST MOD 30 MIN: CPT | Mod: 25 | Performed by: PHYSICAL MEDICINE & REHABILITATION

## 2020-10-12 PROCEDURE — 64616 CHEMODENERV MUSC NECK DYSTON: CPT | Performed by: PHYSICAL MEDICINE & REHABILITATION

## 2020-10-12 PROCEDURE — 64643 CHEMODENERV 1 EXTREM 1-4 EA: CPT | Performed by: PHYSICAL MEDICINE & REHABILITATION

## 2020-10-12 NOTE — PROGRESS NOTES
The patient returns for a follow-up visit for her ongoing issues related to spastic hemiparesis affecting the right side.    She was last seen by me at [facility] [place].  Due to Covid and other reasons, her follow-up was delayed. She continues to have spasticity limiting function and causing pain.    She reports her function is affected and she needs a little more help.  She has some sadness but is tolerating her antidepressant.    Pain in the neck and shoulder region is also limiting.    /83   Pulse 67   Temp 97.8  F (36.6  C)   Resp 16   LMP  (LMP Unknown)   SpO2 96%      On examination, the patient is in her manual wheelchair.  She transfers with assist of her .  She has involvement of right levator scapula, right pectoralis major, right biceps brachii, triceps, flexor carpi ulnaris and flexor carpi radialis, flexor digitorum superficialis and flexor digitorum profundus.  She has increased tone in the flexor pollicis longus and pronator teres.  In the lower extremity tibialis posterior and gastrocnemius are tight.    Impression: Right spastic hemiparesis, torticollis, gait abnormality and spasm of muscles due to cerebrovascular accident.  Based on today's assessment, she would benefit from 600 units of Botox injections.  I will see her in follow-up in about a month's time to see how she is responding and plan future treatments every 12 weeks.    25 minutes for the evaluation and management portion of the visit, greater than 50% was for counseling on hemiparesis and treatment.    Procedure note: With her informed consent, after explaining the benefits and risks of the procedure, and using Betasept for skin prep, EMG for localization, preservative-free normal saline for dilution, 600 units of Botox, lot numbers  [id number]   Expiration June 2023, 100 units were injected into the right pectoralis major, 25 units to right levator scapulae and the neck 25 needs to right flexor pollicis  longus, 50 unis for injected into the following muscles triceps, pronator teres, l flexor carpi radialis, flexor carpi ulnaris, flexor digitorum  Superficialis, flexor digitorum profundus.  50 units each were injected into tibialis posterior and 100 units into the gastrocnemius.. 600 units were utilized in all.  She tolerated it well.  She will use ice, Tylenol as needed.    Cheng Jean MD

## 2020-10-12 NOTE — LETTER
10/12/2020       RE: Anita Bennett  [address]  [place] [address]     Dear Colleague,    Thank you for referring your patient, Anita Bennett, to the [facility] PHYSICAL MEDICINE AND REHABILITATION CLINIC [place] at [facility]. Please see a copy of my visit note below.    The patient returns for a follow-up visit for her ongoing issues related to spastic hemiparesis affecting the right side.    She was last seen by me at [facility].  Due to Covid and other reasons, her follow-up was delayed. She continues to have spasticity limiting function and causing pain.    She reports her function is affected and she needs a little more help.  She has some sadness but is tolerating her antidepressant.    Pain in the neck and shoulder region is also limiting.    /83   Pulse 67   Temp 97.8  F (36.6  C)   Resp 16   LMP  (LMP Unknown)   SpO2 96%      On examination, the patient is in her manual wheelchair.  She transfers with assist of her .  She has involvement of right levator scapula, right pectoralis major, right biceps brachii, triceps, flexor carpi ulnaris and flexor carpi radialis, flexor digitorum superficialis and flexor digitorum profundus.  She has increased tone in the flexor pollicis longus and pronator teres.  In the lower extremity tibialis posterior and gastrocnemius are tight.    Impression: Right spastic hemiparesis, torticollis, gait abnormality and spasm of muscles due to cerebrovascular accident.  Based on today's assessment, she would benefit from 600 units of Botox injections.  I will see her in follow-up in about a month's time to see how she is responding and plan future treatments every 12 weeks.    25 minutes for the evaluation and management portion of the visit, greater than 50% was for counseling on hemiparesis and treatment.    Procedure note: With her informed consent, after explaining the benefits and risks of  the procedure, and using Betasept for skin prep, EMG for localization, preservative-free normal saline for dilution, 600 units of Botox, lot numbers  [id number]   Expiration June 2023, 100 units were injected into the right pectoralis major, 25 units to right levator scapulae and the neck 25 needs to right flexor pollicis longus, 50 unis for injected into the following muscles triceps, pronator teres, l flexor carpi radialis, flexor carpi ulnaris, flexor digitorum  Superficialis, flexor digitorum profundus.  50 units each were injected into tibialis posterior and 100 units into the gastrocnemius.. 600 units were utilized in all.  She tolerated it well.  She will use ice, Tylenol as needed.      Again, thank you for allowing me to participate in the care of your patient.  Sincerely,    Cheng Jean MD

## 2020-10-12 NOTE — LETTER
10/12/2020       RE: Anita Bennett  [address]  [place] [address]     Dear Colleague,    Thank you for referring your patient, Anita Bennett, to the [facility] PHYSICAL MEDICINE AND REHABILITATION CLINIC [place] at [facility]. Please see a copy of my visit note below.        The patient returns for a follow-up visit for her ongoing issues related to spastic hemiparesis affecting the right side.    She was last seen by me at [facility].  Due to Covid and other reasons, her follow-up was delayed. She continues to have spasticity limiting function and causing pain.    She reports her function is affected and she needs a little more help.  She has some sadness but is tolerating her antidepressant.    Pain in the neck and shoulder region is also limiting.    /83   Pulse 67   Temp 97.8  F (36.6  C)   Resp 16   LMP  (LMP Unknown)   SpO2 96%      On examination, the patient is in her manual wheelchair.  She transfers with assist of her .  She has involvement of right levator scapula, right pectoralis major, right biceps brachii, triceps, flexor carpi ulnaris and flexor carpi radialis, flexor digitorum superficialis and flexor digitorum profundus.  She has increased tone in the flexor pollicis longus and pronator teres.  In the lower extremity tibialis posterior and gastrocnemius are tight.    Impression: Right spastic hemiparesis, torticollis, gait abnormality and spasm of muscles due to cerebrovascular accident.  Based on today's assessment, she would benefit from 600 units of Botox injections.  I will see her in follow-up in about a month's time to see how she is responding and plan future treatments every 12 weeks.    25 minutes for the evaluation and management portion of the visit, greater than 50% was for counseling on hemiparesis and treatment.    Procedure note: With her informed consent, after explaining the benefits and risks  of the procedure, and using Betasept for skin prep, EMG for localization, preservative-free normal saline for dilution, 600 units of Botox, lot numbers  [id number]   Expiration June 2023, 100 units were injected into the right pectoralis major, 25 units to right levator scapulae and the neck 25 needs to right flexor pollicis longus, 50 unis for injected into the following muscles triceps, pronator teres, l flexor carpi radialis, flexor carpi ulnaris, flexor digitorum  Superficialis, flexor digitorum profundus.  50 units each were injected into tibialis posterior and 100 units into the gastrocnemius.. 600 units were utilized in all.  She tolerated it well.  She will use ice, Tylenol as needed.    Cheng Jean MD       Again, thank you for allowing me to participate in the care of your patient.      Sincerely,    Cheng Jean MD

## 2020-10-15 ENCOUNTER — DOCUMENTATION ONLY (OUTPATIENT)
Dept: CARE COORDINATION | Facility: CLINIC | Age: 60
End: 2020-10-15

## 2020-10-24 DIAGNOSIS — Z86.73 PERSONAL HISTORY OF TRANSIENT CEREBRAL ISCHEMIA: ICD-10-CM

## 2020-10-24 RX ORDER — ASPIRIN 325 MG
TABLET ORAL
Qty: 90 TABLET | Refills: 3 | Status: SHIPPED | OUTPATIENT
Start: 2020-10-24 | End: 2021-10-28

## 2020-10-24 NOTE — TELEPHONE ENCOUNTER
"Requested Prescriptions   Pending Prescriptions Disp Refills     aspirin (ASA) 325 MG tablet [Pharmacy Med Name: ASPIRIN 325 MG TABLET] 90 tablet 1     Sig: TAKE 1 TABLET BY MOUTH EVERY DAY       Analgesics (Non-Narcotic Tylenol and ASA Only) Passed - 10/24/2020 12:16 PM        Passed - Recent (12 mo) or future (30 days) visit within the authorizing provider's specialty     Patient has had an office visit with the authorizing provider or a provider within the authorizing providers department within the previous 12 mos or has a future within next 30 days. See \"Patient Info\" tab in inbasket, or \"Choose Columns\" in Meds & Orders section of the refill encounter.              Passed - Patient is age 20 years or older     If ASA is flagged for ages under 20 years old. Forward to provider for confirmation Ryes Syndrome is not a concern.              Passed - Medication is active on med list           Signed Prescriptions:                        Disp   Refills    aspirin (ASA) 325 MG tablet                90 tab*3        Sig: TAKE 1 TABLET BY MOUTH EVERY DAY  Authorizing Provider: SKYLER BE  Ordering User: TIMI ROSAS      "

## 2020-10-28 ENCOUNTER — OFFICE VISIT (OUTPATIENT)
Dept: OPTOMETRY | Facility: CLINIC | Age: 60
End: 2020-10-28
Payer: MEDICARE

## 2020-10-28 DIAGNOSIS — H52.223 REGULAR ASTIGMATISM OF BOTH EYES: ICD-10-CM

## 2020-10-28 DIAGNOSIS — H52.03 HYPEROPIA, BILATERAL: ICD-10-CM

## 2020-10-28 DIAGNOSIS — H52.4 PRESBYOPIA: ICD-10-CM

## 2020-10-28 DIAGNOSIS — H11.001 PTERYGIUM OF RIGHT EYE: ICD-10-CM

## 2020-10-28 DIAGNOSIS — H10.13 ALLERGIC CONJUNCTIVITIS, BILATERAL: ICD-10-CM

## 2020-10-28 DIAGNOSIS — H25.13 NUCLEAR SCLEROSIS OF BOTH EYES: Primary | ICD-10-CM

## 2020-10-28 PROCEDURE — 92014 COMPRE OPH EXAM EST PT 1/>: CPT | Performed by: OPTOMETRIST

## 2020-10-28 PROCEDURE — 92015 DETERMINE REFRACTIVE STATE: CPT | Performed by: OPTOMETRIST

## 2020-10-28 ASSESSMENT — CONF VISUAL FIELD
OS_NORMAL: 1
OD_NORMAL: 1

## 2020-10-28 ASSESSMENT — VISUAL ACUITY
METHOD: SNELLEN - LINEAR
OS_CC: 20/50?
OS_CC: 20/200?
OD_CC: 20/40?
METHOD_MR: NUMBERS

## 2020-10-28 ASSESSMENT — REFRACTION_MANIFEST
OS_ADD: +2.50
OD_AXIS: 030
OD_ADD: +2.50
OS_AXIS: 180
OS_CYLINDER: +0.50
OS_SPHERE: +0.75
OD_CYLINDER: +0.50
OD_SPHERE: +1.00

## 2020-10-28 ASSESSMENT — EXTERNAL EXAM - RIGHT EYE: OD_EXAM: NORMAL

## 2020-10-28 ASSESSMENT — CUP TO DISC RATIO
OD_RATIO: 0.45
OS_RATIO: 0.4

## 2020-10-28 ASSESSMENT — REFRACTION_WEARINGRX
OS_AXIS: 180
OD_ADD: +2.50
OD_SPHERE: +1.00
OS_SPHERE: +0.50
OD_CYLINDER: +0.50
OS_CYLINDER: +0.50
SPECS_TYPE: BIFOCAL
OS_ADD: +2.50
OD_AXIS: 030

## 2020-10-28 ASSESSMENT — TONOMETRY
OD_IOP_MMHG: 15
IOP_METHOD: TONOPEN
OS_IOP_MMHG: 16

## 2020-10-28 ASSESSMENT — EXTERNAL EXAM - LEFT EYE: OS_EXAM: NORMAL

## 2020-10-28 ASSESSMENT — SLIT LAMP EXAM - LIDS
COMMENTS: NORMAL
COMMENTS: NORMAL

## 2020-10-28 NOTE — PROGRESS NOTES
Chief Complaint   Patient presents with     Annual Eye Exam      Accompanied by   Last Eye Exam: 5-6-2019  Dilated Previously: Yes    What are you currently using to see?  glasses       Distance Vision Acuity: Satisfied with vision    Near Vision Acuity: Satisfied with vision while reading  with glasses    Eye Comfort: good  Do you use eye drops? : Yes: optivar daily  Occupation or Hobbies: none    Paulette Ayala Optometric Assistant, A.B.O.C.          Medical, surgical and family histories reviewed and updated 10/28/2020.       OBJECTIVE: See Ophthalmology exam    ASSESSMENT:    ICD-10-CM    1. Nuclear sclerosis of both eyes  H25.13 EYE EXAM (SIMPLE-NONBILLABLE)   2. Pterygium of right eye  H11.001 EYE EXAM (SIMPLE-NONBILLABLE)   3. Hyperopia, bilateral  H52.03 REFRACTION   4. Regular astigmatism of both eyes  H52.223 REFRACTION   5. Presbyopia  H52.4 REFRACTION   6. Allergic conjunctivitis, bilateral  H10.13 EYE EXAM (SIMPLE-NONBILLABLE)      PLAN:     Patient Instructions   You have the start of mild cataracts.  You may notice some blurred vision or glare with night driving.  It is important that you wear good sunglasses to protect your eyes from the ultraviolet light from the sun.  I recommend that you return in 1 year for an eye exam unless there are any sudden changes in your vision.       Eyeglass prescription given.    Optivar- 1 drop both eyes 2 x day as needed for itchy eyes.    Return in 1 year for a complete eye exam or sooner if needed.    James Jo, OD

## 2020-10-28 NOTE — PATIENT INSTRUCTIONS
You have the start of mild cataracts.  You may notice some blurred vision or glare with night driving.  It is important that you wear good sunglasses to protect your eyes from the ultraviolet light from the sun.  I recommend that you return in 1 year for an eye exam unless there are any sudden changes in your vision.       Eyeglass prescription given.    Optivar- 1 drop both eyes 2 x day as needed for itchy eyes.    Return in 1 year for a complete eye exam or sooner if needed.    James Jo, OD    The affects of the dilating drops last for 4- 6 hours.  You will be more sensitive to light and vision will be blurry up close.  Do not drive if you do not feel comfortable.  Mydriatic sunglasses were given if needed.      Optometry Providers       Clinic Locations                                 Telephone Number   Dr. Stella Livingston [phone]     [facility] Hours:                [place] [facility] Hours:       [facility] Hours:   [address]   [address] [address]     [address]  [place] [address]   [place] [address]    [place] [address]  Phone: [phone]                    Phone: [phone]     Phone: [phone]                      Monday 8:00-7:00                          Monday 8:00-7:00                          Monday 8:00-7:00              Tuesday 8:00-6:00                          Tuesday 8:00-7:00                          Tuesday 8:00-7:00              Wednesday 8:00-6:00                  Wednesday 8:00-7:00                   Wednesday 8:00-7:00      Thursday 8:00-6:00                        Thursday 8:00-7:00                         Thursday 8:00-7:00            Friday 8:00-5:00                              Friday 8:00-5:00                              Friday 8:00-5:00    [facility] Hours:   [address] [place] [id number] [phone]    Monday  8:00-7:00  Tuesday 8:00-7:00  Wednesday 8:00-7:00  Thursday 8:00-7:00  Friday 8:00-5:00  Please log on to [email or web address] to order your contact lenses.  The link is found on the Eye Care and Vision Services page.  As always, Thank you for trusting us with your health care needs!

## 2020-10-28 NOTE — LETTER
10/28/2020         RE: Anita Bennett  [address]  [place] [address]        Dear Colleague,    Thank you for referring your patient, Anita Bennett, to the [facility]. Please see a copy of my visit note below.    Chief Complaint   Patient presents with     Annual Eye Exam      Accompanied by   Last Eye Exam: 5-6-2019  Dilated Previously: Yes    What are you currently using to see?  glasses       Distance Vision Acuity: Satisfied with vision    Near Vision Acuity: Satisfied with vision while reading  with glasses    Eye Comfort: good  Do you use eye drops? : Yes: optivar daily  Occupation or Hobbies: none    Paulette Ayala Optometric Assistant, A.B.O.C.          Medical, surgical and family histories reviewed and updated 10/28/2020.       OBJECTIVE: See Ophthalmology exam    ASSESSMENT:    ICD-10-CM    1. Nuclear sclerosis of both eyes  H25.13 EYE EXAM (SIMPLE-NONBILLABLE)   2. Pterygium of right eye  H11.001 EYE EXAM (SIMPLE-NONBILLABLE)   3. Hyperopia, bilateral  H52.03 REFRACTION   4. Regular astigmatism of both eyes  H52.223 REFRACTION   5. Presbyopia  H52.4 REFRACTION   6. Allergic conjunctivitis, bilateral  H10.13 EYE EXAM (SIMPLE-NONBILLABLE)      PLAN:     Patient Instructions   You have the start of mild cataracts.  You may notice some blurred vision or glare with night driving.  It is important that you wear good sunglasses to protect your eyes from the ultraviolet light from the sun.  I recommend that you return in 1 year for an eye exam unless there are any sudden changes in your vision.       Eyeglass prescription given.    Optivar- 1 drop both eyes 2 x day as needed for itchy eyes.    Return in 1 year for a complete eye exam or sooner if needed.    James Jo, OD           Again, thank you for allowing me to participate in the care of your patient.        Sincerely,        James Jo, OD

## 2020-10-29 DIAGNOSIS — E55.9 VITAMIN D DEFICIENCY: ICD-10-CM

## 2020-10-30 RX ORDER — CHOLECALCIFEROL (VITAMIN D3) 25 MCG
TABLET ORAL
Qty: 30 TABLET | Refills: 10 | Status: SHIPPED | OUTPATIENT
Start: 2020-10-30 | End: 2021-10-11

## 2020-10-30 NOTE — TELEPHONE ENCOUNTER
"Prescription approved per [facility] [place] Protocol.    Requested Prescriptions   Pending Prescriptions Disp Refills     VITAMIN D3 25 MCG (1000 UT) tablet [Pharmacy Med Name: VITAMIN D3 25 MCG TABLET] 30 tablet 1     Sig: TAKE 1 TABLET BY MOUTH EVERY DAY       Vitamin Supplements (Adult) Protocol Passed - 10/29/2020  1:17 PM        Passed - High dose Vitamin D not ordered        Passed - Recent (12 mo) or future (30 days) visit within the authorizing provider's specialty     Patient has had an office visit with the authorizing provider or a provider within the authorizing providers department within the previous 12 mos or has a future within next 30 days. See \"Patient Info\" tab in inbasket, or \"Choose Columns\" in Meds & Orders section of the refill encounter.              Passed - Medication is active on med list           Eliu Singer RN, BSN, PHN    "

## 2020-11-06 ENCOUNTER — ANCILLARY PROCEDURE (OUTPATIENT)
Dept: MAMMOGRAPHY | Facility: CLINIC | Age: 60
End: 2020-11-06
Attending: FAMILY MEDICINE
Payer: MEDICARE

## 2020-11-06 DIAGNOSIS — Z12.31 ENCOUNTER FOR SCREENING MAMMOGRAM FOR BREAST CANCER: ICD-10-CM

## 2020-11-06 PROCEDURE — 77067 SCR MAMMO BI INCL CAD: CPT | Performed by: RADIOLOGY

## 2020-11-09 ENCOUNTER — OFFICE VISIT (OUTPATIENT)
Dept: PHYSICAL MEDICINE AND REHAB | Facility: CLINIC | Age: 60
End: 2020-11-09
Payer: MEDICARE

## 2020-11-09 VITALS
RESPIRATION RATE: 16 BRPM | HEART RATE: 69 BPM | OXYGEN SATURATION: 93 % | SYSTOLIC BLOOD PRESSURE: 104 MMHG | DIASTOLIC BLOOD PRESSURE: 68 MMHG

## 2020-11-09 DIAGNOSIS — M53.3 DISORDER OF SACRUM: ICD-10-CM

## 2020-11-09 DIAGNOSIS — I69.951 SPASTIC HEMIPLEGIA OF RIGHT DOMINANT SIDE AS LATE EFFECT OF CEREBROVASCULAR DISEASE, UNSPECIFIED CEREBROVASCULAR DISEASE TYPE (H): Primary | ICD-10-CM

## 2020-11-09 DIAGNOSIS — M70.61 TROCHANTERIC BURSITIS OF RIGHT HIP: ICD-10-CM

## 2020-11-09 DIAGNOSIS — G57.01 PIRIFORMIS SYNDROME, RIGHT: ICD-10-CM

## 2020-11-09 PROCEDURE — 99215 OFFICE O/P EST HI 40 MIN: CPT | Performed by: PHYSICAL MEDICINE & REHABILITATION

## 2020-11-09 NOTE — LETTER
11/9/2020       RE: Anita Bennett  [address]  [place] [address]     Dear Colleague,    Thank you for referring your patient, Anita Bennett, to the [facility] [place] [facility]. Please see a copy of my visit note below.    Patient returns for follow-up visit.  She was last seen by me on 10/12/2020.  At that time she underwent Botox injections.  She finds them helpful in reducing the tone on the right side.  She still has spasticity affecting her finger flexors and thumb flexors.    She has been dealing with some back and hip issues with chronic pain.  She has had previous treatment with improvement.    Past Medical History:   Diagnosis Date     Acute ischemic left MCA stroke (H) 3/22/15     Essential hypertension, benign      Past Surgical History:   Procedure Laterality Date     NO HISTORY OF SURGERY       Current Outpatient Medications   Medication Sig Dispense Refill     aspirin (ASA) 325 MG tablet TAKE 1 TABLET BY MOUTH EVERY DAY 90 tablet 3     azelastine (OPTIVAR) 0.05 % ophthalmic solution Place 1 drop into both eyes 2 times daily as needed (for itchy water eyes) 5 mL 11     baclofen (LIORESAL) 20 MG tablet TAKE 1 TABLET BY MOUTH 3 TIMES A DAY       escitalopram (LEXAPRO) 10 MG tablet Take 10 mg by mouth daily       gabapentin (NEURONTIN) 400 MG capsule 1 capsule 3 times daily for arm mobility.       hydrochlorothiazide (HYDRODIURIL) 12.5 MG tablet Take 1 tablet (12.5 mg) by mouth daily as needed for blood pressure. 90 tablet 0     losartan (COZAAR) 50 MG tablet Take 1 tablet (50 mg) by mouth daily as needed for blood pressure. 90 tablet 0     metoprolol succinate ER (TOPROL-XL) 50 MG 24 hr tablet Take 1 tablet (50 mg) by mouth daily for blood pressure. 90 tablet 1     nabumetone (RELAFEN) 500 MG tablet TAKE 1-2 TABLETS BY MOUTH TWICE A DAY AS NEEDED FOR PAIN IN ARM OR HIP, TAKE WITH FOOD. 60 tablet 2      order for DME Equipment being ordered: gloves for use in patient post stroke with incontinence 100 Device 11     pantoprazole (PROTONIX) 40 MG EC tablet Take 1 tablet (40 mg) by mouth daily for reflux. 90 tablet 1     SENEXON-S 8.6-50 MG tablet TAKE 1 TABLET BY MOUTH DAILY AS NEEDED FOR CONSTIPATION 30 tablet 11     simvastatin (ZOCOR) 20 MG tablet Take 1 tablet (20 mg) by mouth every evening for cholesterol. 90 tablet 1     triamcinolone (KENALOG) 0.1 % cream Apply sparingly to affected area three times daily as needed 80 g 0     VITAMIN D3 25 MCG (1000 UT) tablet TAKE 1 TABLET BY MOUTH EVERY DAY 30 tablet 10        /68   Pulse 69   Resp 16   LMP  (LMP Unknown)   SpO2 93%     On examination the patient is alert and cooperative.   present.  Spouse is helpful.  She is able to get up from the wheelchair onto the examination table and lie prone.  In a supine position, she has pain with Ian's bilaterally.  She is also tender over the right trochanteric bursa and right piriformis.  She had minimal tenderness over the right lumbar paraspinal region.    Impression: At this point,  , this patient is dealing with spastic hemiparesis affecting the right side.  This is resulting in  Significant gait abnormality which is contributing to her pain.This is affecting her hip and back resulting in chronic pain.  I would therefore recommend injecting the SI joints bilaterally, right trochanteric bursa and right piriformis.  Anticipate she will get good relief with this lasting about 6 months.  At that point it will be repeated if pain recurs.    This was discussed at length with the patient.  No need for her to get off any anticoagulation at this point for these procedures.  This will be scheduled once approved.  She will come 1 hour before procedure which will take about 30 to 45 minutes and she will be observed for about 30 minutes before being discharged home.    40 minutes visit, greater than 50% was for  counseling on chronic pain and treatment.    Cheng Jean MD

## 2020-11-09 NOTE — PROGRESS NOTES
Patient returns for follow-up visit.  She was last seen by me on 10/12/2020.  At that time she underwent Botox injections.  She finds them helpful in reducing the tone on the right side.  She still has spasticity affecting her finger flexors and thumb flexors.    She has been dealing with some back and hip issues with chronic pain.  She has had previous treatment with improvement.    Past Medical History:   Diagnosis Date     Acute ischemic left MCA stroke (H) 3/22/15     Essential hypertension, benign      Past Surgical History:   Procedure Laterality Date     NO HISTORY OF SURGERY       Current Outpatient Medications   Medication Sig Dispense Refill     aspirin (ASA) 325 MG tablet TAKE 1 TABLET BY MOUTH EVERY DAY 90 tablet 3     azelastine (OPTIVAR) 0.05 % ophthalmic solution Place 1 drop into both eyes 2 times daily as needed (for itchy water eyes) 5 mL 11     baclofen (LIORESAL) 20 MG tablet TAKE 1 TABLET BY MOUTH 3 TIMES A DAY       escitalopram (LEXAPRO) 10 MG tablet Take 10 mg by mouth daily       gabapentin (NEURONTIN) 400 MG capsule 1 capsule 3 times daily for arm mobility.       hydrochlorothiazide (HYDRODIURIL) 12.5 MG tablet Take 1 tablet (12.5 mg) by mouth daily as needed for blood pressure. 90 tablet 0     losartan (COZAAR) 50 MG tablet Take 1 tablet (50 mg) by mouth daily as needed for blood pressure. 90 tablet 0     metoprolol succinate ER (TOPROL-XL) 50 MG 24 hr tablet Take 1 tablet (50 mg) by mouth daily for blood pressure. 90 tablet 1     nabumetone (RELAFEN) 500 MG tablet TAKE 1-2 TABLETS BY MOUTH TWICE A DAY AS NEEDED FOR PAIN IN ARM OR HIP, TAKE WITH FOOD. 60 tablet 2     order for DME Equipment being ordered: gloves for use in patient post stroke with incontinence 100 Device 11     pantoprazole (PROTONIX) 40 MG EC tablet Take 1 tablet (40 mg) by mouth daily for reflux. 90 tablet 1     SENEXON-S 8.6-50 MG tablet TAKE 1 TABLET BY MOUTH DAILY AS NEEDED FOR CONSTIPATION 30 tablet 11      simvastatin (ZOCOR) 20 MG tablet Take 1 tablet (20 mg) by mouth every evening for cholesterol. 90 tablet 1     triamcinolone (KENALOG) 0.1 % cream Apply sparingly to affected area three times daily as needed 80 g 0     VITAMIN D3 25 MCG (1000 UT) tablet TAKE 1 TABLET BY MOUTH EVERY DAY 30 tablet 10        /68   Pulse 69   Resp 16   LMP  (LMP Unknown)   SpO2 93%       On examination the patient is alert and cooperative.   present.  Spouse is helpful.  She is able to get up from the wheelchair onto the examination table and lie prone.  In a supine position, she has pain with Ian's bilaterally.  She is also tender over the right trochanteric bursa and right piriformis.  She had minimal tenderness over the right lumbar paraspinal region.    Impression: At this point,  , this patient is dealing with spastic hemiparesis affecting the right side.  This is resulting in  Significant gait abnormality which is contributing to her pain.This is affecting her hip and back resulting in chronic pain.  I would therefore recommend injecting the SI joints bilaterally, right trochanteric bursa and right piriformis.  Anticipate she will get good relief with this lasting about 6 months.  At that point it will be repeated if pain recurs.    This was discussed at length with the patient.  No need for her to get off any anticoagulation at this point for these procedures.  This will be scheduled once approved.  She will come 1 hour before procedure which will take about 30 to 45 minutes and she will be observed for about 30 minutes before being discharged home.    40 minutes visit, greater than 50% was for counseling on chronic pain and treatment.    Cheng Jean MD

## 2020-11-10 DIAGNOSIS — M25.511 RIGHT SHOULDER PAIN, UNSPECIFIED CHRONICITY: ICD-10-CM

## 2020-11-10 DIAGNOSIS — M79.601 RIGHT UPPER LIMB PAIN: ICD-10-CM

## 2020-11-12 NOTE — TELEPHONE ENCOUNTER
Routing refill request to provider for review/approval because:  Labs not current:  AST, CBC    Rosa Segovia RN  [facility]

## 2020-11-14 RX ORDER — NABUMETONE 500 MG/1
TABLET, FILM COATED ORAL
Qty: 60 TABLET | Refills: 2 | Status: SHIPPED | OUTPATIENT
Start: 2020-11-14 | End: 2021-03-11

## 2020-11-24 DIAGNOSIS — M62.838 SPASM OF MUSCLE: Primary | ICD-10-CM

## 2020-11-24 RX ORDER — BACLOFEN 20 MG/1
20 TABLET ORAL 3 TIMES DAILY
Qty: 90 TABLET | Refills: 3 | Status: SHIPPED | OUTPATIENT
Start: 2020-11-24 | End: 2021-04-07

## 2020-12-05 DIAGNOSIS — I10 ESSENTIAL HYPERTENSION WITH GOAL BLOOD PRESSURE LESS THAN 140/90: ICD-10-CM

## 2020-12-08 RX ORDER — LOSARTAN POTASSIUM 50 MG/1
50 TABLET ORAL DAILY PRN
Qty: 90 TABLET | Refills: 2 | Status: SHIPPED | OUTPATIENT
Start: 2020-12-08 | End: 2021-03-11

## 2020-12-08 NOTE — TELEPHONE ENCOUNTER
Prescription approved per [facility] Refill Protocol.  Danielle Park RN  [facility]

## 2020-12-09 DIAGNOSIS — I10 ESSENTIAL HYPERTENSION WITH GOAL BLOOD PRESSURE LESS THAN 140/90: ICD-10-CM

## 2020-12-11 RX ORDER — HYDROCHLOROTHIAZIDE 12.5 MG/1
12.5 TABLET ORAL DAILY PRN
Qty: 90 TABLET | Refills: 1 | Status: SHIPPED | OUTPATIENT
Start: 2020-12-11 | End: 2021-03-11

## 2020-12-11 NOTE — TELEPHONE ENCOUNTER
"  Requested Prescriptions   Pending Prescriptions Disp Refills     hydrochlorothiazide (HYDRODIURIL) 12.5 MG tablet [Pharmacy Med Name: HYDROCHLOROTHIAZIDE 12.5 MG TB] 90 tablet 0     Sig: TAKE 1 TABLET (12.5 MG) BY MOUTH DAILY AS NEEDED FOR BLOOD PRESSURE.   9/11/2020    Diuretics (Including Combos) Protocol Passed - 12/9/2020 10:32 AM        Passed - Blood pressure under 140/90 in past 12 months     BP Readings from Last 3 Encounters:   11/09/20 104/68   10/12/20 138/83   09/11/20 134/83           Passed - Recent (12 mo) or future (30 days) visit within the authorizing provider's specialty     Patient has had an office visit with the authorizing provider or a provider within the authorizing providers department within the previous 12 mos or has a future within next 30 days. See \"Patient Info\" tab in inbasket, or \"Choose Columns\" in Meds & Orders section of the refill encounter.          Passed - Medication is active on med list        Passed - Patient is age 18 or older        Passed - No active pregancy on record        Passed - Normal serum creatinine on file in past 12 months     Recent Labs   Lab Test 09/11/20  1135   CR 0.98            Passed - Normal serum potassium on file in past 12 months     Recent Labs   Lab Test 09/11/20  1135   POTASSIUM 3.9           Passed - Normal serum sodium on file in past 12 months     Recent Labs   Lab Test 09/11/20  1135               Passed - No positive pregnancy test in past 12 months         Prescription approved per [facility] Refill Protocol.  Meghan Wilkerson RN      "

## 2021-01-04 ENCOUNTER — OFFICE VISIT (OUTPATIENT)
Dept: PHYSICAL MEDICINE AND REHAB | Facility: CLINIC | Age: 61
End: 2021-01-04
Payer: MEDICARE

## 2021-01-04 VITALS — RESPIRATION RATE: 16 BRPM | HEART RATE: 61 BPM | SYSTOLIC BLOOD PRESSURE: 135 MMHG | DIASTOLIC BLOOD PRESSURE: 82 MMHG

## 2021-01-04 DIAGNOSIS — M43.6 TORTICOLLIS: ICD-10-CM

## 2021-01-04 DIAGNOSIS — M62.838 SPASM OF MUSCLE: ICD-10-CM

## 2021-01-04 DIAGNOSIS — R26.9 ABNORMAL GAIT: ICD-10-CM

## 2021-01-04 DIAGNOSIS — I69.951 SPASTIC HEMIPLEGIA OF RIGHT DOMINANT SIDE AS LATE EFFECT OF CEREBROVASCULAR DISEASE, UNSPECIFIED CEREBROVASCULAR DISEASE TYPE (H): Primary | ICD-10-CM

## 2021-01-04 PROCEDURE — 99214 OFFICE O/P EST MOD 30 MIN: CPT | Mod: 25 | Performed by: PHYSICAL MEDICINE & REHABILITATION

## 2021-01-04 PROCEDURE — 64616 CHEMODENERV MUSC NECK DYSTON: CPT | Performed by: PHYSICAL MEDICINE & REHABILITATION

## 2021-01-04 PROCEDURE — 95874 GUIDE NERV DESTR NEEDLE EMG: CPT | Performed by: PHYSICAL MEDICINE & REHABILITATION

## 2021-01-04 PROCEDURE — 64646 CHEMODENERV TRUNK MUSC 1-5: CPT | Performed by: PHYSICAL MEDICINE & REHABILITATION

## 2021-01-04 PROCEDURE — 64644 CHEMODENERV 1 EXTREM 5/> MUS: CPT | Performed by: PHYSICAL MEDICINE & REHABILITATION

## 2021-01-04 PROCEDURE — 64643 CHEMODENERV 1 EXTREM 1-4 EA: CPT | Performed by: PHYSICAL MEDICINE & REHABILITATION

## 2021-01-04 RX ORDER — ONABOTULINUMTOXINA 100 [USP'U]/1
600 INJECTION, POWDER, LYOPHILIZED, FOR SOLUTION INTRADERMAL; INTRAMUSCULAR ONCE
Qty: 600 UNITS | Refills: 0 | OUTPATIENT
Start: 2021-01-04 | End: 2021-01-04

## 2021-01-04 NOTE — NURSING NOTE
Chief Complaint   Patient presents with     Stroke     Botox     [facility] RETURN BOTOX RIGHT ARM AND LEG- SPASMS       Christian Nolasco, EMT

## 2021-01-04 NOTE — PROGRESS NOTES
The patient returns for a follow-up visit for her ongoing issues related to spastic hemiparesis affecting the right side.     She has been  seen by me previously at [facility] stroke [place].     She reports her function is affected and she needs a little more help.  She has some sadness but is tolerating her antidepressant.     Pain in the neck and shoulder region is also limiting. Botox done on 10/12/20 helped.    Past Medical History:   Diagnosis Date     Acute ischemic left MCA stroke (H) 3/22/15     Essential hypertension, benign         Current Outpatient Medications   Medication Sig Dispense Refill     aspirin (ASA) 325 MG tablet TAKE 1 TABLET BY MOUTH EVERY DAY 90 tablet 3     azelastine (OPTIVAR) 0.05 % ophthalmic solution Place 1 drop into both eyes 2 times daily as needed (for itchy water eyes) 5 mL 11     baclofen (LIORESAL) 20 MG tablet Take 1 tablet (20 mg) by mouth 3 times daily 90 tablet 3     escitalopram (LEXAPRO) 10 MG tablet Take 10 mg by mouth daily       gabapentin (NEURONTIN) 400 MG capsule 1 capsule 3 times daily for arm mobility.       hydrochlorothiazide (HYDRODIURIL) 12.5 MG tablet TAKE 1 TABLET (12.5 MG) BY MOUTH DAILY AS NEEDED FOR BLOOD PRESSURE. 90 tablet 1     losartan (COZAAR) 50 MG tablet TAKE 1 TABLET (50 MG) BY MOUTH DAILY AS NEEDED FOR BLOOD PRESSURE. 90 tablet 2     metoprolol succinate ER (TOPROL-XL) 50 MG 24 hr tablet Take 1 tablet (50 mg) by mouth daily for blood pressure. 90 tablet 1     nabumetone (RELAFEN) 500 MG tablet TAKE 1-2 TABLETS BY MOUTH TWICE A DAY AS NEEDED FOR PAIN IN ARM OR HIP, TAKE WITH FOOD. 60 tablet 2     order for DME Equipment being ordered: gloves for use in patient post stroke with incontinence 100 Device 11     pantoprazole (PROTONIX) 40 MG EC tablet Take 1 tablet (40 mg) by mouth daily for reflux. 90 tablet 1     SENEXON-S 8.6-50 MG tablet TAKE 1 TABLET BY MOUTH DAILY AS NEEDED FOR CONSTIPATION 30 tablet 11     simvastatin (ZOCOR) 20 MG tablet  Take 1 tablet (20 mg) by mouth every evening for cholesterol. 90 tablet 1     triamcinolone (KENALOG) 0.1 % cream Apply sparingly to affected area three times daily as needed 80 g 0     VITAMIN D3 25 MCG (1000 UT) tablet TAKE 1 TABLET BY MOUTH EVERY DAY 30 tablet 10         /82   Pulse 61   Resp 16   LMP  (LMP Unknown)        On examination, the patient is in her manual wheelchair.  She transfers with assist of her .  She has involvement of right levator scapula, right pectoralis major, right biceps brachii, flexor carpi ulnaris and flexor carpi radialis, flexor digitorum superficialis and flexor digitorum profundus.  She has increased tone in the flexor pollicis longus and pronator teres  And lumbricals. In the lower extremity tibialis posterior and gastrocnemius are tight.     Impression: Right spastic hemiparesis, torticollis, gait abnormality and spasm of muscles due to cerebrovascular accident.      Based on today's assessment, she would benefit from 600 units of Botox injections.  I will see her in follow-up in about a month's time to see how she is responding and plan future treatments every 12 weeks.     30 minutes for the evaluation and management portion of the visit, greater than 50% was for counseling on hemiparesis and treatment.     Procedure note: With her informed consent, after explaining the benefits and risks of the procedure, and using Betasept for skin prep, EMG for localization, preservative-free normal saline for dilution, 600 units of Botox, lot numbers  [id number]   Expiration Sep 2023, 100 units were injected into the right pectoralis major, 25 units to right levator scapulae and the neck 25 needs to right flexor pollicis longus, 50 unis for injected into the following muscles lumbricals/PI, pronator teres,  flexor carpi radialis, flexor carpi ulnaris, flexor digitorum  Superficialis, flexor digitorum profundus.  50 units each were injected into tibialis posterior and 100 units  into the gastrocnemius.. 600 units were utilized in all.  She tolerated it well.  She will use ice, Tylenol as needed.     Cheng Jean MD

## 2021-02-07 DIAGNOSIS — Z86.73 HISTORY OF CVA (CEREBROVASCULAR ACCIDENT): ICD-10-CM

## 2021-02-09 RX ORDER — SIMVASTATIN 20 MG
20 TABLET ORAL EVERY EVENING
Qty: 90 TABLET | Refills: 1 | Status: SHIPPED | OUTPATIENT
Start: 2021-02-09 | End: 2021-03-11

## 2021-03-11 ENCOUNTER — OFFICE VISIT (OUTPATIENT)
Dept: FAMILY MEDICINE | Facility: CLINIC | Age: 61
End: 2021-03-11
Payer: MEDICARE

## 2021-03-11 VITALS
HEIGHT: 60 IN | DIASTOLIC BLOOD PRESSURE: 58 MMHG | OXYGEN SATURATION: 95 % | HEART RATE: 66 BPM | SYSTOLIC BLOOD PRESSURE: 104 MMHG | TEMPERATURE: 98.2 F | WEIGHT: 144 LBS | BODY MASS INDEX: 28.27 KG/M2 | RESPIRATION RATE: 18 BRPM

## 2021-03-11 DIAGNOSIS — Z86.73 HISTORY OF CVA (CEREBROVASCULAR ACCIDENT): ICD-10-CM

## 2021-03-11 DIAGNOSIS — I10 ESSENTIAL HYPERTENSION WITH GOAL BLOOD PRESSURE LESS THAN 140/90: ICD-10-CM

## 2021-03-11 DIAGNOSIS — M25.511 RIGHT SHOULDER PAIN, UNSPECIFIED CHRONICITY: ICD-10-CM

## 2021-03-11 DIAGNOSIS — K21.9 GASTROESOPHAGEAL REFLUX DISEASE WITHOUT ESOPHAGITIS: ICD-10-CM

## 2021-03-11 DIAGNOSIS — M79.601 RIGHT UPPER LIMB PAIN: ICD-10-CM

## 2021-03-11 LAB
ALT SERPL W P-5'-P-CCNC: 41 U/L (ref 0–50)
AST SERPL W P-5'-P-CCNC: 21 U/L (ref 0–45)
CHOLEST SERPL-MCNC: 134 MG/DL
CREAT SERPL-MCNC: 0.99 MG/DL (ref 0.52–1.04)
ERYTHROCYTE [DISTWIDTH] IN BLOOD BY AUTOMATED COUNT: 11.7 % (ref 10–15)
GFR SERPL CREATININE-BSD FRML MDRD: 62 ML/MIN/{1.73_M2}
HCT VFR BLD AUTO: 35.9 % (ref 35–47)
HDLC SERPL-MCNC: 36 MG/DL
HGB BLD-MCNC: 12.2 G/DL (ref 11.7–15.7)
LDLC SERPL CALC-MCNC: 55 MG/DL
MCH RBC QN AUTO: 34 PG (ref 26.5–33)
MCHC RBC AUTO-ENTMCNC: 34 G/DL (ref 31.5–36.5)
MCV RBC AUTO: 100 FL (ref 78–100)
NONHDLC SERPL-MCNC: 98 MG/DL
PLATELET # BLD AUTO: 189 10E9/L (ref 150–450)
POTASSIUM SERPL-SCNC: 3.9 MMOL/L (ref 3.4–5.3)
RBC # BLD AUTO: 3.59 10E12/L (ref 3.8–5.2)
TRIGL SERPL-MCNC: 216 MG/DL
WBC # BLD AUTO: 3.9 10E9/L (ref 4–11)

## 2021-03-11 PROCEDURE — 84450 TRANSFERASE (AST) (SGOT): CPT | Performed by: FAMILY MEDICINE

## 2021-03-11 PROCEDURE — 36415 COLL VENOUS BLD VENIPUNCTURE: CPT | Performed by: FAMILY MEDICINE

## 2021-03-11 PROCEDURE — 82565 ASSAY OF CREATININE: CPT | Performed by: FAMILY MEDICINE

## 2021-03-11 PROCEDURE — 85027 COMPLETE CBC AUTOMATED: CPT | Performed by: FAMILY MEDICINE

## 2021-03-11 PROCEDURE — 84132 ASSAY OF SERUM POTASSIUM: CPT | Performed by: FAMILY MEDICINE

## 2021-03-11 PROCEDURE — 84460 ALANINE AMINO (ALT) (SGPT): CPT | Performed by: FAMILY MEDICINE

## 2021-03-11 PROCEDURE — 80061 LIPID PANEL: CPT | Performed by: FAMILY MEDICINE

## 2021-03-11 PROCEDURE — 99214 OFFICE O/P EST MOD 30 MIN: CPT | Performed by: FAMILY MEDICINE

## 2021-03-11 RX ORDER — PANTOPRAZOLE SODIUM 40 MG/1
40 TABLET, DELAYED RELEASE ORAL DAILY
Qty: 90 TABLET | Refills: 3 | Status: SHIPPED | OUTPATIENT
Start: 2021-03-11 | End: 2022-01-10

## 2021-03-11 RX ORDER — LOSARTAN POTASSIUM 50 MG/1
50 TABLET ORAL DAILY PRN
Qty: 90 TABLET | Refills: 1 | Status: SHIPPED | OUTPATIENT
Start: 2021-03-11 | End: 2021-09-20

## 2021-03-11 RX ORDER — METOPROLOL SUCCINATE 50 MG/1
50 TABLET, EXTENDED RELEASE ORAL DAILY
Qty: 90 TABLET | Refills: 1 | Status: SHIPPED | OUTPATIENT
Start: 2021-03-11 | End: 2021-09-20

## 2021-03-11 RX ORDER — HYDROCHLOROTHIAZIDE 12.5 MG/1
12.5 TABLET ORAL DAILY PRN
Qty: 90 TABLET | Refills: 1 | Status: SHIPPED | OUTPATIENT
Start: 2021-03-11 | End: 2021-09-20

## 2021-03-11 RX ORDER — SIMVASTATIN 20 MG
20 TABLET ORAL EVERY EVENING
Qty: 90 TABLET | Refills: 1 | Status: SHIPPED | OUTPATIENT
Start: 2021-03-11 | End: 2021-09-20

## 2021-03-11 RX ORDER — NABUMETONE 500 MG/1
500-1000 TABLET, FILM COATED ORAL 2 TIMES DAILY PRN
Qty: 60 TABLET | Refills: 1 | Status: SHIPPED | OUTPATIENT
Start: 2021-03-11 | End: 2021-05-19

## 2021-03-11 ASSESSMENT — PAIN SCALES - GENERAL: PAINLEVEL: NO PAIN (0)

## 2021-03-11 ASSESSMENT — MIFFLIN-ST. JEOR: SCORE: 1141.74

## 2021-03-11 NOTE — PROGRESS NOTES
"    Assessment & Plan     Essential hypertension with goal blood pressure less than 140/90  Well controlled  - hydrochlorothiazide (HYDRODIURIL) 12.5 MG tablet; Take 1 tablet (12.5 mg) by mouth daily as needed for blood pressure.  - losartan (COZAAR) 50 MG tablet; Take 1 tablet (50 mg) by mouth daily as needed for blood pressure.  - metoprolol succinate ER (TOPROL-XL) 50 MG 24 hr tablet; Take 1 tablet (50 mg) by mouth daily for blood pressure.  - Lipid panel reflex to direct LDL Non-fasting  - Potassium  - Creatinine  - CBC with platelets  Return in about 6 months (around 9/11/2021) for Medicare annual wellness exam, cholesterol, blood pressure check.      History of CVA (cerebrovascular accident)  Stable. On a moderate-intensity statin   - simvastatin (ZOCOR) 20 MG tablet; Take 1 tablet (20 mg) by mouth every evening for cholesterol.  - Lipid panel reflex to direct LDL Non-fasting  - ALT  - AST    Right shoulder pain, unspecified chronicity  Right upper limb pain  Needs labs updated for continuation of this medication  - nabumetone (RELAFEN) 500 MG tablet; Take 1-2 tablets (500-1,000 mg) by mouth 2 times daily as needed for pain in arm or hip. Take with food  - Creatinine  - AST  - CBC with platelets    Gastroesophageal reflux disease without esophagitis  prescription update   - pantoprazole (PROTONIX) 40 MG EC tablet; Take 1 tablet (40 mg) by mouth daily for reflux.         BMI:   Estimated body mass index is 28.6 kg/m  as calculated from the following:    Height as of this encounter: 1.511 m (4' 11.5\").    Weight as of this encounter: 65.3 kg (144 lb).       Return in about 6 months (around 9/11/2021) for Medicare annual wellness exam, cholesterol, blood pressure check.    Mulugeta Sharma MD  [facility] ZAIDA Howard is a 60 year old who presents for the following health issues accompanied by her , who interprets for the patient:    HPI       Hyperlipidemia " "Follow-Up      Are you regularly taking any medication or supplement to lower your cholesterol?   Yes- Simvastatin    Are you having muscle aches or other side effects that you think could be caused by your cholesterol lowering medication?  No    Hypertension Follow-up      Do you check your blood pressure regularly outside of the clinic? Yes     Are you following a low salt diet? Yes    Are your blood pressures ever more than 140 on the top number (systolic) OR more   than 90 on the bottom number (diastolic), for example 140/90? Yes, normally 120s      How many servings of fruits and vegetables do you eat daily?  2-3    On average, how many sweetened beverages do you drink each day (Examples: soda, juice, sweet tea, etc.  Do NOT count diet or artificially sweetened beverages)?   0    How many days per week do you exercise enough to make your heart beat faster? 3 or less    How many minutes a day do you exercise enough to make your heart beat faster? 9 or less    How many days per week do you miss taking your medication? 0        Review of Systems   Constitutional, HEENT, cardiovascular, pulmonary, gi and gu systems are negative, except as otherwise noted.      Objective    /58 (BP Location: Left arm, Patient Position: Sitting, Cuff Size: Adult Regular)   Pulse 66   Temp 98.2  F (36.8  C) (Tympanic)   Resp 18   Ht 1.511 m (4' 11.5\")   Wt 65.3 kg (144 lb)   LMP  (LMP Unknown)   SpO2 95%   BMI 28.60 kg/m    Body mass index is 28.6 kg/m .  Physical Exam   GENERAL: healthy, alert and no distress  EYES: Eyes grossly normal to inspection, PERRL, EOMI, sclerae white and conjunctivae normal  RESP: lungs clear to auscultation - no crackles or wheezes, no areas of dullness, no tachypnea  CV: Heart regular rate and rhythm without murmur, click or rub. No peripheral edema and peripheral pulses strong  MS: no gross musculoskeletal defects noted, no edema  SKIN: no suspicious lesions or rashes to visible " skin  NEURO:  sensory exam grossly normal, mentation intact, cranial nerves 2-12 intact, in wheelchair as usual for post-CVA state  PSYCH: mentation appears normal, affect normal/bright     Office Visit on 09/11/2020   Component Date Value Ref Range Status     Cholesterol 09/11/2020 146  <200 mg/dL Final     Triglycerides 09/11/2020 174* <150 mg/dL Final    Comment: Borderline high:  150-199 mg/dl  High:             200-499 mg/dl  Very high:       >499 mg/dl       HDL Cholesterol 09/11/2020 39* >49 mg/dL Final     LDL Cholesterol Calculated 09/11/2020 72  <100 mg/dL Final    Desirable:       <100 mg/dl     Non HDL Cholesterol 09/11/2020 107  <130 mg/dL Final     ALT 09/11/2020 30  0 - 50 U/L Final     Sodium 09/11/2020 143  133 - 144 mmol/L Final     Potassium 09/11/2020 3.9  3.4 - 5.3 mmol/L Final     Chloride 09/11/2020 109  94 - 109 mmol/L Final     Carbon Dioxide 09/11/2020 29  20 - 32 mmol/L Final     Anion Gap 09/11/2020 5  3 - 14 mmol/L Final     Glucose 09/11/2020 87  70 - 99 mg/dL Final     Urea Nitrogen 09/11/2020 11  7 - 30 mg/dL Final     Creatinine 09/11/2020 0.98  0.52 - 1.04 mg/dL Final     GFR Estimate 09/11/2020 63  >60 mL/min/[1.73_m2] Final    Comment: Non  GFR Calc  Starting 12/18/2018, serum creatinine based estimated GFR (eGFR) will be   calculated using the Chronic Kidney Disease Epidemiology Collaboration   (CKD-EPI) equation.       GFR Estimate If Black 09/11/2020 73  >60 mL/min/[1.73_m2] Final    Comment:  GFR Calc  Starting 12/18/2018, serum creatinine based estimated GFR (eGFR) will be   calculated using the Chronic Kidney Disease Epidemiology Collaboration   (CKD-EPI) equation.       Calcium 09/11/2020 9.0  8.5 - 10.1 mg/dL Final

## 2021-03-16 DIAGNOSIS — M47.812 CERVICAL SPONDYLOSIS WITHOUT MYELOPATHY: ICD-10-CM

## 2021-03-16 DIAGNOSIS — F32.A DEPRESSION: Primary | ICD-10-CM

## 2021-03-16 RX ORDER — GABAPENTIN 400 MG/1
400 CAPSULE ORAL 3 TIMES DAILY
Qty: 90 CAPSULE | Refills: 11 | Status: SHIPPED | OUTPATIENT
Start: 2021-03-16 | End: 2022-03-01

## 2021-03-16 RX ORDER — ESCITALOPRAM OXALATE 10 MG/1
10 TABLET ORAL DAILY
Qty: 30 TABLET | Refills: 11 | Status: SHIPPED | OUTPATIENT
Start: 2021-03-16 | End: 2022-03-01

## 2021-03-30 ENCOUNTER — OFFICE VISIT (OUTPATIENT)
Dept: PHYSICAL MEDICINE AND REHAB | Facility: CLINIC | Age: 61
End: 2021-03-30
Payer: MEDICARE

## 2021-03-30 VITALS
SYSTOLIC BLOOD PRESSURE: 107 MMHG | RESPIRATION RATE: 16 BRPM | TEMPERATURE: 97.9 F | OXYGEN SATURATION: 99 % | DIASTOLIC BLOOD PRESSURE: 70 MMHG | HEART RATE: 60 BPM

## 2021-03-30 DIAGNOSIS — R26.9 GAIT ABNORMALITY: ICD-10-CM

## 2021-03-30 DIAGNOSIS — I69.920 APHASIA, LATE EFFECT OF CEREBROVASCULAR DISEASE: ICD-10-CM

## 2021-03-30 DIAGNOSIS — G81.11 RIGHT SPASTIC HEMIPARESIS (H): Primary | ICD-10-CM

## 2021-03-30 DIAGNOSIS — M43.6 TORTICOLLIS: ICD-10-CM

## 2021-03-30 PROCEDURE — 99213 OFFICE O/P EST LOW 20 MIN: CPT | Mod: 24 | Performed by: PHYSICAL MEDICINE & REHABILITATION

## 2021-03-30 PROCEDURE — 64644 CHEMODENERV 1 EXTREM 5/> MUS: CPT | Performed by: PHYSICAL MEDICINE & REHABILITATION

## 2021-03-30 PROCEDURE — 64646 CHEMODENERV TRUNK MUSC 1-5: CPT | Performed by: PHYSICAL MEDICINE & REHABILITATION

## 2021-03-30 PROCEDURE — 95874 GUIDE NERV DESTR NEEDLE EMG: CPT | Performed by: PHYSICAL MEDICINE & REHABILITATION

## 2021-03-30 PROCEDURE — 64643 CHEMODENERV 1 EXTREM 1-4 EA: CPT | Performed by: PHYSICAL MEDICINE & REHABILITATION

## 2021-03-30 NOTE — LETTER
3/30/2021       RE: Anita Bennett  [address]  [place] [address]     Dear Colleague,    Thank you for referring your patient, Anita Bennett, to the [facility] PHYSICAL MEDICINE AND REHABILITATION CLINIC [place] at [facility]. Please see a copy of my visit note below.    3/30/2021    The patient returns for a follow-up visit for her ongoing issues related to spastic hemiparesis affecting the right side.     She has been  seen by me previously at [facility] stroke [place].     She reports her function is affected and she needs a little more help.  She has some sadness but is tolerating her antidepressant.     Pain in the neck and shoulder region is also limiting. Botox done on 1/4/21 helped.    Past Medical History:   Diagnosis Date     Acute ischemic left MCA stroke (H) 3/22/15     Essential hypertension, benign        /70   Pulse 60   Temp 97.9  F (36.6  C)   Resp 16   LMP  (LMP Unknown)   SpO2 99%     Current Outpatient Medications   Medication Sig Dispense Refill     aspirin (ASA) 325 MG tablet TAKE 1 TABLET BY MOUTH EVERY DAY 90 tablet 3     azelastine (OPTIVAR) 0.05 % ophthalmic solution Place 1 drop into both eyes 2 times daily as needed (for itchy water eyes) 5 mL 11     baclofen (LIORESAL) 20 MG tablet Take 1 tablet (20 mg) by mouth 3 times daily 90 tablet 3     escitalopram (LEXAPRO) 10 MG tablet Take 1 tablet (10 mg) by mouth daily 30 tablet 11     gabapentin (NEURONTIN) 400 MG capsule Take 1 capsule (400 mg) by mouth 3 times daily for arm mobility. 90 capsule 11     hydrochlorothiazide (HYDRODIURIL) 12.5 MG tablet Take 1 tablet (12.5 mg) by mouth daily as needed for blood pressure. 90 tablet 1     losartan (COZAAR) 50 MG tablet Take 1 tablet (50 mg) by mouth daily as needed for blood pressure. 90 tablet 1     metoprolol succinate ER (TOPROL-XL) 50 MG 24 hr tablet Take 1 tablet (50 mg) by mouth daily for blood pressure. 90 tablet 1      nabumetone (RELAFEN) 500 MG tablet Take 1-2 tablets (500-1,000 mg) by mouth 2 times daily as needed for pain in arm or hip. Take with food 60 tablet 1     order for DME Equipment being ordered: gloves for use in patient post stroke with incontinence 100 Device 11     pantoprazole (PROTONIX) 40 MG EC tablet Take 1 tablet (40 mg) by mouth daily for reflux. 90 tablet 3     SENEXON-S 8.6-50 MG tablet TAKE 1 TABLET BY MOUTH DAILY AS NEEDED FOR CONSTIPATION 30 tablet 11     simvastatin (ZOCOR) 20 MG tablet Take 1 tablet (20 mg) by mouth every evening for cholesterol. 90 tablet 1     triamcinolone (KENALOG) 0.1 % cream Apply sparingly to affected area three times daily as needed 80 g 0     VITAMIN D3 25 MCG (1000 UT) tablet TAKE 1 TABLET BY MOUTH EVERY DAY 30 tablet 10       On examination, the patient is in her manual wheelchair.  She transfers with assist of her .  She has involvement of right levator scapula, right pectoralis major, right biceps brachii, flexor carpi ulnaris and flexor carpi radialis, flexor digitorum superficialis and flexor digitorum profundus.  She has increased tone in the flexor pollicis longus and pronator teres  And lumbricals. In the lower extremity tibialis posterior and gastrocnemius are tight.     Impression: Right spastic hemiparesis, torticollis, gait abnormality and spasm of muscles due to cerebrovascular accident.       Based on today's assessment, she would benefit from 600 units of Botox injections.  I will see her in follow-up in about a month's time to see how she is responding and plan future treatments every 12 weeks.     20 minutes for the evaluation and management portion of the visit, greater than 50% was for counseling on hemiparesis and treatment.     Procedure note: With her informed consent, after explaining the benefits and risks of the procedure, and using Betasept for skin prep, EMG for localization, preservative-free normal saline for dilution, 600 units of  Botox, lot numbers  [id number]   Expiration Dec 2023, 100 units were injected into the right pectoralis major,  25 needs to right flexor pollicis longus, 35 unis for injected into the following muscles lumbricals/PI,  flexor carpi radialis, flexor carpi ulnaris, flexor digitorum  Superficialis, flexor digitorum profundus.  50 units each were injected into the biceps brachii,  tibialis posterior, FDL, EHL and 100 units into the gastrocnemius.. 600 units were utilized in all.  She tolerated it well.  She will use ice, Tylenol as needed.     Cheng Jean MD

## 2021-03-30 NOTE — PROGRESS NOTES
3/30/2021    The patient returns for a follow-up visit for her ongoing issues related to spastic hemiparesis affecting the right side.     She has been  seen by me previously at [facility] stroke [place].     She reports her function is affected and she needs a little more help.  She has some sadness but is tolerating her antidepressant.     Pain in the neck and shoulder region is also limiting. Botox done on 1/4/21 helped.    Past Medical History:   Diagnosis Date     Acute ischemic left MCA stroke (H) 3/22/15     Essential hypertension, benign        /70   Pulse 60   Temp 97.9  F (36.6  C)   Resp 16   LMP  (LMP Unknown)   SpO2 99%     Current Outpatient Medications   Medication Sig Dispense Refill     aspirin (ASA) 325 MG tablet TAKE 1 TABLET BY MOUTH EVERY DAY 90 tablet 3     azelastine (OPTIVAR) 0.05 % ophthalmic solution Place 1 drop into both eyes 2 times daily as needed (for itchy water eyes) 5 mL 11     baclofen (LIORESAL) 20 MG tablet Take 1 tablet (20 mg) by mouth 3 times daily 90 tablet 3     escitalopram (LEXAPRO) 10 MG tablet Take 1 tablet (10 mg) by mouth daily 30 tablet 11     gabapentin (NEURONTIN) 400 MG capsule Take 1 capsule (400 mg) by mouth 3 times daily for arm mobility. 90 capsule 11     hydrochlorothiazide (HYDRODIURIL) 12.5 MG tablet Take 1 tablet (12.5 mg) by mouth daily as needed for blood pressure. 90 tablet 1     losartan (COZAAR) 50 MG tablet Take 1 tablet (50 mg) by mouth daily as needed for blood pressure. 90 tablet 1     metoprolol succinate ER (TOPROL-XL) 50 MG 24 hr tablet Take 1 tablet (50 mg) by mouth daily for blood pressure. 90 tablet 1     nabumetone (RELAFEN) 500 MG tablet Take 1-2 tablets (500-1,000 mg) by mouth 2 times daily as needed for pain in arm or hip. Take with food 60 tablet 1     order for DME Equipment being ordered: gloves for use in patient post stroke with incontinence 100 Device 11     pantoprazole (PROTONIX) 40 MG EC tablet Take 1 tablet (40 mg)  by mouth daily for reflux. 90 tablet 3     SENEXON-S 8.6-50 MG tablet TAKE 1 TABLET BY MOUTH DAILY AS NEEDED FOR CONSTIPATION 30 tablet 11     simvastatin (ZOCOR) 20 MG tablet Take 1 tablet (20 mg) by mouth every evening for cholesterol. 90 tablet 1     triamcinolone (KENALOG) 0.1 % cream Apply sparingly to affected area three times daily as needed 80 g 0     VITAMIN D3 25 MCG (1000 UT) tablet TAKE 1 TABLET BY MOUTH EVERY DAY 30 tablet 10       On examination, the patient is in her manual wheelchair.  She transfers with assist of her .  She has involvement of right levator scapula, right pectoralis major, right biceps brachii, flexor carpi ulnaris and flexor carpi radialis, flexor digitorum superficialis and flexor digitorum profundus.  She has increased tone in the flexor pollicis longus and pronator teres  And lumbricals. In the lower extremity tibialis posterior and gastrocnemius are tight.     Impression: Right spastic hemiparesis, torticollis, gait abnormality and spasm of muscles due to cerebrovascular accident.       Based on today's assessment, she would benefit from 600 units of Botox injections.  I will see her in follow-up in about a month's time to see how she is responding and plan future treatments every 12 weeks.     20 minutes for the evaluation and management portion of the visit, greater than 50% was for counseling on hemiparesis and treatment.     Procedure note: With her informed consent, after explaining the benefits and risks of the procedure, and using Betasept for skin prep, EMG for localization, preservative-free normal saline for dilution, 600 units of Botox, lot numbers  [id number]   Expiration Dec 2023, 100 units were injected into the right pectoralis major,  25 needs to right flexor pollicis longus, 35 unis for injected into the following muscles lumbricals/PI,  flexor carpi radialis, flexor carpi ulnaris, flexor digitorum  Superficialis, flexor digitorum profundus.  50 units each  were injected into the biceps brachii,  tibialis posterior, FDL, EHL and 100 units into the gastrocnemius.. 600 units were utilized in all.  She tolerated it well.  She will use ice, Tylenol as needed.     Cheng Jean MD

## 2021-04-07 DIAGNOSIS — M62.838 SPASM OF MUSCLE: ICD-10-CM

## 2021-04-07 RX ORDER — BACLOFEN 20 MG/1
20 TABLET ORAL 3 TIMES DAILY
Qty: 270 TABLET | Refills: 3 | Status: SHIPPED | OUTPATIENT
Start: 2021-04-07 | End: 2022-03-30

## 2021-05-09 DIAGNOSIS — M25.511 RIGHT SHOULDER PAIN, UNSPECIFIED CHRONICITY: ICD-10-CM

## 2021-05-09 DIAGNOSIS — M79.601 RIGHT UPPER LIMB PAIN: ICD-10-CM

## 2021-05-10 NOTE — TELEPHONE ENCOUNTER
Routing refill request to provider for review/approval because:  Labs out of range:  [facility]  Saniya Feliz BSN, RN

## 2021-05-19 RX ORDER — NABUMETONE 500 MG/1
TABLET, FILM COATED ORAL
Qty: 60 TABLET | Refills: 1 | Status: SHIPPED | OUTPATIENT
Start: 2021-05-19 | End: 2021-09-05

## 2021-06-22 ENCOUNTER — OFFICE VISIT (OUTPATIENT)
Dept: PHYSICAL MEDICINE AND REHAB | Facility: CLINIC | Age: 61
End: 2021-06-22
Payer: MEDICARE

## 2021-06-22 VITALS
DIASTOLIC BLOOD PRESSURE: 53 MMHG | HEART RATE: 64 BPM | TEMPERATURE: 97.7 F | OXYGEN SATURATION: 96 % | RESPIRATION RATE: 16 BRPM | SYSTOLIC BLOOD PRESSURE: 97 MMHG

## 2021-06-22 DIAGNOSIS — M47.817 LUMBOSACRAL SPONDYLOSIS WITHOUT MYELOPATHY: ICD-10-CM

## 2021-06-22 DIAGNOSIS — M43.6 TORTICOLLIS: ICD-10-CM

## 2021-06-22 DIAGNOSIS — M62.838 SPASM OF MUSCLE: ICD-10-CM

## 2021-06-22 DIAGNOSIS — M47.817 LUMBOSACRAL SPONDYLOSIS WITHOUT MYELOPATHY: Primary | ICD-10-CM

## 2021-06-22 DIAGNOSIS — G81.11 RIGHT SPASTIC HEMIPARESIS (H): Primary | ICD-10-CM

## 2021-06-22 DIAGNOSIS — M47.812 CERVICAL SPONDYLOSIS WITHOUT MYELOPATHY: ICD-10-CM

## 2021-06-22 DIAGNOSIS — R26.9 GAIT ABNORMALITY: ICD-10-CM

## 2021-06-22 DIAGNOSIS — M47.812 CERVICAL SPONDYLOSIS WITHOUT MYELOPATHY: Primary | ICD-10-CM

## 2021-06-22 PROCEDURE — 95874 GUIDE NERV DESTR NEEDLE EMG: CPT | Performed by: PHYSICAL MEDICINE & REHABILITATION

## 2021-06-22 PROCEDURE — 64643 CHEMODENERV 1 EXTREM 1-4 EA: CPT | Performed by: PHYSICAL MEDICINE & REHABILITATION

## 2021-06-22 PROCEDURE — 64644 CHEMODENERV 1 EXTREM 5/> MUS: CPT | Performed by: PHYSICAL MEDICINE & REHABILITATION

## 2021-06-22 PROCEDURE — 64646 CHEMODENERV TRUNK MUSC 1-5: CPT | Performed by: PHYSICAL MEDICINE & REHABILITATION

## 2021-06-22 PROCEDURE — 99215 OFFICE O/P EST HI 40 MIN: CPT | Mod: 24 | Performed by: PHYSICAL MEDICINE & REHABILITATION

## 2021-06-22 ASSESSMENT — PAIN SCALES - GENERAL: PAINLEVEL: NO PAIN (0)

## 2021-06-22 NOTE — PROGRESS NOTES
6/22/2021      The patient returns for a follow-up visit for her ongoing issues related to spastic hemiparesis affecting the right side.     She has been  seen by me previously at [facility] stroke [place].     Last seen here on 3/30/2021. Botox done helped.    She has been complaining of pain in the left side of the neck and the lower back.  She has previously responded nicely to injection therapy with medial branch blocks to the lumbar and cervical region.  Both diagnostic and confirmatory blocks were done with good relief.  This was done in July and August 2019.  She has done physical therapy without relief and continues to do home exercise program without relief.  We will seek authorization for repeating the injections with the plan for radiofrequency ablations if she gets good relief as expected.  We will go from lumbar L3-S1 and cervical from C2-C5 including the third occipital nerve.    She reports her function is affected and she needs a little more help.  She has history of sadness and is doing quite well currently and  is tolerating her antidepressant.       Past Medical History:   Diagnosis Date     Acute ischemic left MCA stroke (H) 3/22/15     Essential hypertension, benign        Current Outpatient Medications   Medication Sig Dispense Refill     aspirin (ASA) 325 MG tablet TAKE 1 TABLET BY MOUTH EVERY DAY 90 tablet 3     azelastine (OPTIVAR) 0.05 % ophthalmic solution Place 1 drop into both eyes 2 times daily as needed (for itchy water eyes) 5 mL 11     baclofen (LIORESAL) 20 MG tablet Take 1 tablet (20 mg) by mouth 3 times daily 270 tablet 3     escitalopram (LEXAPRO) 10 MG tablet Take 1 tablet (10 mg) by mouth daily 30 tablet 11     gabapentin (NEURONTIN) 400 MG capsule Take 1 capsule (400 mg) by mouth 3 times daily for arm mobility. 90 capsule 11     hydrochlorothiazide (HYDRODIURIL) 12.5 MG tablet Take 1 tablet (12.5 mg) by mouth daily as needed for blood pressure. 90 tablet 1     losartan  (COZAAR) 50 MG tablet Take 1 tablet (50 mg) by mouth daily as needed for blood pressure. 90 tablet 1     metoprolol succinate ER (TOPROL-XL) 50 MG 24 hr tablet Take 1 tablet (50 mg) by mouth daily for blood pressure. 90 tablet 1     nabumetone (RELAFEN) 500 MG tablet TAKE 1-2 TABLETS (500-1,000 MG) BY MOUTH 2 TIMES DAILY AS NEEDED FOR PAIN IN ARM OR HIP WITH FOOD 60 tablet 1     order for DME Equipment being ordered: gloves for use in patient post stroke with incontinence 100 Device 11     pantoprazole (PROTONIX) 40 MG EC tablet Take 1 tablet (40 mg) by mouth daily for reflux. 90 tablet 3     SENEXON-S 8.6-50 MG tablet TAKE 1 TABLET BY MOUTH DAILY AS NEEDED FOR CONSTIPATION 30 tablet 11     simvastatin (ZOCOR) 20 MG tablet Take 1 tablet (20 mg) by mouth every evening for cholesterol. 90 tablet 1     triamcinolone (KENALOG) 0.1 % cream Apply sparingly to affected area three times daily as needed 80 g 0     VITAMIN D3 25 MCG (1000 UT) tablet TAKE 1 TABLET BY MOUTH EVERY DAY 30 tablet 10        Resp 16   LMP  (LMP Unknown)        On examination, the patient is in her manual wheelchair.  She transfers with assist of her .  She has involvement of right levator scapula, right pectoralis major, right biceps brachii, flexor carpi ulnaris and flexor carpi radialis, flexor digitorum superficialis and flexor digitorum profundus.  She has increased tone in the flexor pollicis longus and pronator teres  And lumbricals. In the lower extremity tibialis posterior and gastrocnemius are tight.     Impression: Right spastic hemiparesis, torticollis, gait abnormality and spasm of muscles due to cerebrovascular accident.       Based on today's assessment, she would benefit from 600 units of Botox injections.  I will see her in follow-up in about a month's time to see how she is responding and plan future treatments every 12 weeks.     40 minutes for the evaluation and management portion of the visit, greater than 50% was for  counseling on hemiparesis and treatment.     Procedure note: With her informed consent, after explaining the benefits and risks of the procedure, and using Betasept for skin prep, EMG for localization, preservative-free normal saline for dilution, 600 units of Botox, lot numbers  [id number]   Expiration 10/ 2023, 100 units were injected into the right pectoralis major,  25 needs to right flexor pollicis longus, 35 unis for injected into the following muscles lumbricals/PI,  flexor carpi radialis, flexor carpi ulnaris, flexor digitorum  Superficialis, flexor digitorum profundus.  50 units each were injected into the biceps brachii,  tibialis posterior, FDL, EHL and 100 units into the gastrocnemius.. 600 units were utilized in all.  She tolerated it well.  She will use ice, Tylenol as needed.     Cheng Jean MD

## 2021-06-22 NOTE — LETTER
6/22/2021       RE: Anita Bennett  [address]  [place] [address]     Dear Colleague,    Thank you for referring your patient, Anita Bennett, to the [facility] PHYSICAL MEDICINE AND REHABILITATION CLINIC [place] at [facility]. Please see a copy of my visit note below.    6/22/2021      The patient returns for a follow-up visit for her ongoing issues related to spastic hemiparesis affecting the right side.     She has been  seen by me previously at [facility] stroke [place].     Last seen here on 3/30/2021. Botox done helped.    She has been complaining of pain in the left side of the neck and the lower back.  She has previously responded nicely to injection therapy with medial branch blocks to the lumbar and cervical region.  Both diagnostic and confirmatory blocks were done with good relief.  This was done in July and August 2019.  She has done physical therapy without relief and continues to do home exercise program without relief.  We will seek authorization for repeating the injections with the plan for radiofrequency ablations if she gets good relief as expected.  We will go from lumbar L3-S1 and cervical from C2-C5 including the third occipital nerve.    She reports her function is affected and she needs a little more help.  She has history of sadness and is doing quite well currently and  is tolerating her antidepressant.       Past Medical History:   Diagnosis Date     Acute ischemic left MCA stroke (H) 3/22/15     Essential hypertension, benign        Current Outpatient Medications   Medication Sig Dispense Refill     aspirin (ASA) 325 MG tablet TAKE 1 TABLET BY MOUTH EVERY DAY 90 tablet 3     azelastine (OPTIVAR) 0.05 % ophthalmic solution Place 1 drop into both eyes 2 times daily as needed (for itchy water eyes) 5 mL 11     baclofen (LIORESAL) 20 MG tablet Take 1 tablet (20 mg) by mouth 3 times daily 270 tablet 3     escitalopram (LEXAPRO) 10  MG tablet Take 1 tablet (10 mg) by mouth daily 30 tablet 11     gabapentin (NEURONTIN) 400 MG capsule Take 1 capsule (400 mg) by mouth 3 times daily for arm mobility. 90 capsule 11     hydrochlorothiazide (HYDRODIURIL) 12.5 MG tablet Take 1 tablet (12.5 mg) by mouth daily as needed for blood pressure. 90 tablet 1     losartan (COZAAR) 50 MG tablet Take 1 tablet (50 mg) by mouth daily as needed for blood pressure. 90 tablet 1     metoprolol succinate ER (TOPROL-XL) 50 MG 24 hr tablet Take 1 tablet (50 mg) by mouth daily for blood pressure. 90 tablet 1     nabumetone (RELAFEN) 500 MG tablet TAKE 1-2 TABLETS (500-1,000 MG) BY MOUTH 2 TIMES DAILY AS NEEDED FOR PAIN IN ARM OR HIP WITH FOOD 60 tablet 1     order for DME Equipment being ordered: gloves for use in patient post stroke with incontinence 100 Device 11     pantoprazole (PROTONIX) 40 MG EC tablet Take 1 tablet (40 mg) by mouth daily for reflux. 90 tablet 3     SENEXON-S 8.6-50 MG tablet TAKE 1 TABLET BY MOUTH DAILY AS NEEDED FOR CONSTIPATION 30 tablet 11     simvastatin (ZOCOR) 20 MG tablet Take 1 tablet (20 mg) by mouth every evening for cholesterol. 90 tablet 1     triamcinolone (KENALOG) 0.1 % cream Apply sparingly to affected area three times daily as needed 80 g 0     VITAMIN D3 25 MCG (1000 UT) tablet TAKE 1 TABLET BY MOUTH EVERY DAY 30 tablet 10        Resp 16   LMP  (LMP Unknown)        On examination, the patient is in her manual wheelchair.  She transfers with assist of her .  She has involvement of right levator scapula, right pectoralis major, right biceps brachii, flexor carpi ulnaris and flexor carpi radialis, flexor digitorum superficialis and flexor digitorum profundus.  She has increased tone in the flexor pollicis longus and pronator teres  And lumbricals. In the lower extremity tibialis posterior and gastrocnemius are tight.     Impression: Right spastic hemiparesis, torticollis, gait abnormality and spasm of muscles due to  cerebrovascular accident.       Based on today's assessment, she would benefit from 600 units of Botox injections.  I will see her in follow-up in about a month's time to see how she is responding and plan future treatments every 12 weeks.     40 minutes for the evaluation and management portion of the visit, greater than 50% was for counseling on hemiparesis and treatment.     Procedure note: With her informed consent, after explaining the benefits and risks of the procedure, and using Betasept for skin prep, EMG for localization, preservative-free normal saline for dilution, 600 units of Botox, lot numbers  [id number]   Expiration 10/ 2023, 100 units were injected into the right pectoralis major,  25 needs to right flexor pollicis longus, 35 unis for injected into the following muscles lumbricals/PI,  flexor carpi radialis, flexor carpi ulnaris, flexor digitorum  Superficialis, flexor digitorum profundus.  50 units each were injected into the biceps brachii,  tibialis posterior, FDL, EHL and 100 units into the gastrocnemius.. 600 units were utilized in all.  She tolerated it well.  She will use ice, Tylenol as needed.     Cheng Jean MD         Again, thank you for allowing me to participate in the care of your patient.      Sincerely,    Cheng Jean MD

## 2021-07-13 DIAGNOSIS — Z53.9 DIAGNOSIS NOT YET DEFINED: Primary | ICD-10-CM

## 2021-07-13 PROCEDURE — G0179 MD RECERTIFICATION HHA PT: HCPCS | Performed by: FAMILY MEDICINE

## 2021-08-26 DIAGNOSIS — M25.511 RIGHT SHOULDER PAIN, UNSPECIFIED CHRONICITY: ICD-10-CM

## 2021-08-26 DIAGNOSIS — M79.601 RIGHT UPPER LIMB PAIN: ICD-10-CM

## 2021-08-27 NOTE — TELEPHONE ENCOUNTER
"Requested Prescriptions   Pending Prescriptions Disp Refills    nabumetone (RELAFEN) 500 MG tablet [Pharmacy Med Name: NABUMETONE 500 MG TABLET] 60 tablet 1     Sig: TAKE 1-2 TABLETS (500-1,000 MG) BY MOUTH 2 TIMES DAILY AS NEEDED FOR PAIN IN ARM OR HIP WITH FOOD        NSAID Medications Failed - 8/26/2021  8:17 PM        Failed - Normal CBC on file in past 12 months     Recent Labs   Lab Test 03/11/21  1233   WBC 3.9*   RBC 3.59*   HGB 12.2   HCT 35.9                    Passed - Blood pressure under 140/90 in past 12 months       BP Readings from Last 3 Encounters:   06/22/21 97/53   03/30/21 107/70   03/11/21 104/58                 Passed - Normal ALT on file in past 12 months     Recent Labs   Lab Test 03/11/21  1233   ALT 41             Passed - Normal AST on file in past 12 months       Recent Labs   Lab Test 03/11/21  1233   AST 21             Passed - Recent (12 mo) or future (30 days) visit within the authorizing provider's specialty     Patient has had an office visit with the authorizing provider or a provider within the authorizing providers department within the previous 12 mos or has a future within next 30 days. See \"Patient Info\" tab in inbasket, or \"Choose Columns\" in Meds & Orders section of the refill encounter.              Passed - Patient is age 6-64 years        Passed - Medication is active on med list        Passed - No active pregnancy on record        Passed - Normal serum creatinine on file in past 12 months     Recent Labs   Lab Test 03/11/21  1233   CR 0.99       Ok to refill medication if creatinine is low          Passed - No positive pregnancy test in past 12 months              "

## 2021-09-05 RX ORDER — NABUMETONE 500 MG/1
TABLET, FILM COATED ORAL
Qty: 60 TABLET | Refills: 1 | Status: SHIPPED | OUTPATIENT
Start: 2021-09-05 | End: 2021-10-31

## 2021-09-20 ENCOUNTER — OFFICE VISIT (OUTPATIENT)
Dept: FAMILY MEDICINE | Facility: CLINIC | Age: 61
End: 2021-09-20
Payer: MEDICARE

## 2021-09-20 VITALS
SYSTOLIC BLOOD PRESSURE: 102 MMHG | BODY MASS INDEX: 29.91 KG/M2 | TEMPERATURE: 98.5 F | HEART RATE: 68 BPM | DIASTOLIC BLOOD PRESSURE: 65 MMHG | WEIGHT: 150.6 LBS

## 2021-09-20 DIAGNOSIS — Z23 NEED FOR VACCINATION: ICD-10-CM

## 2021-09-20 DIAGNOSIS — I69.920 APHASIA, LATE EFFECT OF CEREBROVASCULAR DISEASE: ICD-10-CM

## 2021-09-20 DIAGNOSIS — F33.0 MILD EPISODE OF RECURRENT MAJOR DEPRESSIVE DISORDER (H): ICD-10-CM

## 2021-09-20 DIAGNOSIS — Z86.73 HISTORY OF CVA (CEREBROVASCULAR ACCIDENT): ICD-10-CM

## 2021-09-20 DIAGNOSIS — Z12.11 COLON CANCER SCREENING: ICD-10-CM

## 2021-09-20 DIAGNOSIS — Z00.00 ENCOUNTER FOR MEDICARE ANNUAL WELLNESS EXAM: Primary | ICD-10-CM

## 2021-09-20 DIAGNOSIS — I10 ESSENTIAL HYPERTENSION WITH GOAL BLOOD PRESSURE LESS THAN 140/90: ICD-10-CM

## 2021-09-20 LAB
ALT SERPL W P-5'-P-CCNC: 47 U/L (ref 0–50)
ANION GAP SERPL CALCULATED.3IONS-SCNC: 5 MMOL/L (ref 3–14)
AST SERPL W P-5'-P-CCNC: 28 U/L (ref 0–45)
BUN SERPL-MCNC: 12 MG/DL (ref 7–30)
CALCIUM SERPL-MCNC: 9 MG/DL (ref 8.5–10.1)
CHLORIDE BLD-SCNC: 108 MMOL/L (ref 94–109)
CHOLEST SERPL-MCNC: 142 MG/DL
CO2 SERPL-SCNC: 30 MMOL/L (ref 20–32)
CREAT SERPL-MCNC: 1.02 MG/DL (ref 0.52–1.04)
ERYTHROCYTE [DISTWIDTH] IN BLOOD BY AUTOMATED COUNT: 11.9 % (ref 10–15)
FASTING STATUS PATIENT QL REPORTED: YES
GFR SERPL CREATININE-BSD FRML MDRD: 60 ML/MIN/1.73M2
GLUCOSE BLD-MCNC: 91 MG/DL (ref 70–99)
HCT VFR BLD AUTO: 36.9 % (ref 35–47)
HDLC SERPL-MCNC: 44 MG/DL
HGB BLD-MCNC: 12.3 G/DL (ref 11.7–15.7)
LDLC SERPL CALC-MCNC: 73 MG/DL
MCH RBC QN AUTO: 33 PG (ref 26.5–33)
MCHC RBC AUTO-ENTMCNC: 33.3 G/DL (ref 31.5–36.5)
MCV RBC AUTO: 99 FL (ref 78–100)
NONHDLC SERPL-MCNC: 98 MG/DL
PLATELET # BLD AUTO: 188 10E3/UL (ref 150–450)
POTASSIUM BLD-SCNC: 4.3 MMOL/L (ref 3.4–5.3)
RBC # BLD AUTO: 3.73 10E6/UL (ref 3.8–5.2)
SODIUM SERPL-SCNC: 143 MMOL/L (ref 133–144)
TRIGL SERPL-MCNC: 123 MG/DL
WBC # BLD AUTO: 4.4 10E3/UL (ref 4–11)

## 2021-09-20 PROCEDURE — 90471 IMMUNIZATION ADMIN: CPT | Performed by: FAMILY MEDICINE

## 2021-09-20 PROCEDURE — 80048 BASIC METABOLIC PNL TOTAL CA: CPT | Performed by: FAMILY MEDICINE

## 2021-09-20 PROCEDURE — G0439 PPPS, SUBSEQ VISIT: HCPCS | Performed by: FAMILY MEDICINE

## 2021-09-20 PROCEDURE — 36415 COLL VENOUS BLD VENIPUNCTURE: CPT | Performed by: FAMILY MEDICINE

## 2021-09-20 PROCEDURE — 90714 TD VACC NO PRESV 7 YRS+ IM: CPT | Performed by: FAMILY MEDICINE

## 2021-09-20 PROCEDURE — G0008 ADMIN INFLUENZA VIRUS VAC: HCPCS | Mod: 59 | Performed by: FAMILY MEDICINE

## 2021-09-20 PROCEDURE — 99214 OFFICE O/P EST MOD 30 MIN: CPT | Mod: 25 | Performed by: FAMILY MEDICINE

## 2021-09-20 PROCEDURE — 85027 COMPLETE CBC AUTOMATED: CPT | Performed by: FAMILY MEDICINE

## 2021-09-20 PROCEDURE — 84460 ALANINE AMINO (ALT) (SGPT): CPT | Performed by: FAMILY MEDICINE

## 2021-09-20 PROCEDURE — 84450 TRANSFERASE (AST) (SGOT): CPT | Performed by: FAMILY MEDICINE

## 2021-09-20 PROCEDURE — 80061 LIPID PANEL: CPT | Performed by: FAMILY MEDICINE

## 2021-09-20 PROCEDURE — 90682 RIV4 VACC RECOMBINANT DNA IM: CPT | Performed by: FAMILY MEDICINE

## 2021-09-20 RX ORDER — LOSARTAN POTASSIUM 50 MG/1
50 TABLET ORAL DAILY PRN
Qty: 90 TABLET | Refills: 1 | Status: SHIPPED | OUTPATIENT
Start: 2021-09-20 | End: 2022-03-21

## 2021-09-20 RX ORDER — HYDROCHLOROTHIAZIDE 12.5 MG/1
12.5 TABLET ORAL DAILY PRN
Qty: 90 TABLET | Refills: 1 | Status: SHIPPED | OUTPATIENT
Start: 2021-09-20 | End: 2022-03-21

## 2021-09-20 RX ORDER — METOPROLOL SUCCINATE 50 MG/1
50 TABLET, EXTENDED RELEASE ORAL DAILY
Qty: 90 TABLET | Refills: 1 | Status: SHIPPED | OUTPATIENT
Start: 2021-09-20 | End: 2022-01-28

## 2021-09-20 RX ORDER — SIMVASTATIN 20 MG
20 TABLET ORAL EVERY EVENING
Qty: 90 TABLET | Refills: 1 | Status: SHIPPED | OUTPATIENT
Start: 2021-09-20 | End: 2022-01-28

## 2021-09-20 ASSESSMENT — ENCOUNTER SYMPTOMS
JOINT SWELLING: 0
ABDOMINAL PAIN: 0
EYE PAIN: 0
SORE THROAT: 0
HEADACHES: 0
NERVOUS/ANXIOUS: 0
CHILLS: 0
DYSURIA: 0
DIZZINESS: 0
BREAST MASS: 0
ARTHRALGIAS: 0
PALPITATIONS: 0
MYALGIAS: 0
COUGH: 0
WEAKNESS: 0
DIARRHEA: 0
CONSTIPATION: 0
HEARTBURN: 0
NAUSEA: 0
HEMATOCHEZIA: 0
SHORTNESS OF BREATH: 0
FEVER: 0
HEMATURIA: 0
PARESTHESIAS: 0
FREQUENCY: 0

## 2021-09-20 ASSESSMENT — ACTIVITIES OF DAILY LIVING (ADL)
CURRENT_FUNCTION: MEDICATION ADMINISTRATION REQUIRES ASSISTANCE
CURRENT_FUNCTION: TELEPHONE REQUIRES ASSISTANCE
CURRENT_FUNCTION: BATHING REQUIRES ASSISTANCE
CURRENT_FUNCTION: SHOPPING REQUIRES ASSISTANCE
CURRENT_FUNCTION: LAUNDRY REQUIRES ASSISTANCE
CURRENT_FUNCTION: TRANSPORTATION REQUIRES ASSISTANCE
CURRENT_FUNCTION: HOUSEWORK REQUIRES ASSISTANCE
CURRENT_FUNCTION: PREPARING MEALS REQUIRES ASSISTANCE
CURRENT_FUNCTION: MONEY MANAGEMENT REQUIRES ASSISTANCE

## 2021-09-20 ASSESSMENT — PATIENT HEALTH QUESTIONNAIRE - PHQ9
10. IF YOU CHECKED OFF ANY PROBLEMS, HOW DIFFICULT HAVE THESE PROBLEMS MADE IT FOR YOU TO DO YOUR WORK, TAKE CARE OF THINGS AT HOME, OR GET ALONG WITH OTHER PEOPLE: NOT DIFFICULT AT ALL
SUM OF ALL RESPONSES TO PHQ QUESTIONS 1-9: 2
SUM OF ALL RESPONSES TO PHQ QUESTIONS 1-9: 2

## 2021-09-20 NOTE — PATIENT INSTRUCTIONS
Patient Education   Personalized Prevention Plan  You are due for the preventive services outlined below.  Your care team is available to assist you in scheduling these services.  If you have already completed any of these items, please share that information with your care team to update in your medical record.  Health Maintenance Due   Topic Date Due     ANNUAL REVIEW OF HM ORDERS  Never done     Depression Action Plan  Never done     Depression Assessment  11/11/2015     Flu Vaccine (1) 09/01/2021     Annual Wellness Visit  09/11/2021

## 2021-09-20 NOTE — LETTER
My Depression Action Plan  Name: Anita Bennett   Date of Birth 1960  Date: 9/20/2021    My doctor: Mulugeta Sharma   My clinic: [address] [id number] [phone]          GREEN    ZONE   Good Control    What it looks like:     Things are going generally well. You have normal ups and downs. You may even feel depressed from time to time, but bad moods usually last less than a day.   What you need to do:  1. Continue to care for yourself (see self care plan)  2. Check your depression survival kit and update it as needed  3. Follow your physician s recommendations including any medication.  4. Do not stop taking medication unless you consult with your physician first.           YELLOW         ZONE Getting Worse    What it looks like:     Depression is starting to interfere with your life.     It may be hard to get out of bed; you may be starting to isolate yourself from others.    Symptoms of depression are starting to last most all day and this has happened for several days.     You may have suicidal thoughts but they are not constant.   What you need to do:     1. Call your care team. Your response to treatment will improve if you keep your care team informed of your progress. Yellow periods are signs an adjustment may need to be made.     2. Continue your self-care.  Just get dressed and ready for the day.  Don't give yourself time to talk yourself out of it.    3. Talk to someone in your support network.    4. Open up your Depression Self-Care Plan/Wellness Kit.           RED    ZONE Medical Alert - Get Help    What it looks like:     Depression is seriously interfering with your life.     You may experience these or other symptoms: You can t get out of bed most days, can t work or engage in other necessary activities, you have trouble taking care of basic hygiene, or basic responsibilities, thoughts of suicide or death that will  not go away, self-injurious behavior.     What you need to do:  1. Call your care team and request a same-day appointment. If they are not available (weekends or after hours) call your local crisis line, emergency room or 911.          Depression Self-Care Plan / Wellness Kit    Many people find that medication and therapy are helpful treatments for managing depression. In addition, making small changes to your everyday life can help to boost your mood and improve your wellbeing. Below are some tips for you to consider. Be sure to talk with your medical provider and/or behavioral health consultant if your symptoms are worsening or not improving.     Sleep   Sleep hygiene  means all of the habits that support good, restful sleep. It includes maintaining a consistent bedtime and wake time, using your bedroom only for sleeping or sex, and keeping the bedroom dark and free of distractions like a computer, smartphone, or television.     Develop a Healthy Routine  Maintain good hygiene. Get out of bed in the morning, make your bed, brush your teeth, take a shower, and get dressed. Don t spend too much time viewing media that makes you feel stressed. Find time to relax each day.    Exercise  Get some form of exercise every day. This will help reduce pain and release endorphins, the  feel good  chemicals in your brain. It can be as simple as just going for a walk or doing some gardening, anything that will get you moving.      Diet  Strive to eat healthy foods, including fruits and vegetables. Drink plenty of water. Avoid excessive sugar, caffeine, alcohol, and other mood-altering substances.     Stay Connected with Others  Stay in touch with friends and family members.    Manage Your Mood  Try deep breathing, massage therapy, biofeedback, or meditation. Take part in fun activities when you can. Try to find something to smile about each day.     Psychotherapy  Be open to working with a therapist if your provider recommends  it.     Medication  Be sure to take your medication as prescribed. Most anti-depressants need to be taken every day. It usually takes several weeks for medications to work. Not all medicines work for all people. It is important to follow-up with your provider to make sure you have a treatment plan that is working for you. Do not stop your medication abruptly without first discussing it with your provider.    Crisis Resources   These hotlines are for both adults and children. They and are open 24 hours a day, 7 days a week unless noted otherwise.      National Suicide Prevention Lifeline   5-703-414-TALK (0713)      Crisis Text Line    www.crisistextline.org  Text HOME to 802263 from anywhere in the [place], anytime, about any type of crisis. A live, trained crisis counselor will receive the text and respond quickly.      [employer] Lifeline for LGBTQ Youth  A national crisis intervention and suicide lifeline for LGBTQ youth under 25. Provides a safe place to talk without judgement. Call 1-222.396.9776; text START to 901595 or visit www.thetrevorproject.org to talk to a trained counselor.      For [facility] crisis numbers, visit the [facility] website at:  https://[place].gov/dhs/people-we-serve/adults/health-care/mental-health/resources/crisis-contacts.jsp

## 2021-09-20 NOTE — NURSING NOTE
Prior to immunization administration, verified patients identity using patient s name and date of birth. Please see Immunization Activity for additional information.     Screening Questionnaire for Adult Immunization    Are you sick today?   No   Do you have allergies to medications, food, a vaccine component or latex?   No   Have you ever had a serious reaction after receiving a vaccination?   No   Do you have a long-term health problem with heart, lung, kidney, or metabolic disease (e.g., diabetes), asthma, a blood disorder, no spleen, complement component deficiency, a cochlear implant, or a spinal fluid leak?  Are you on long-term aspirin therapy?   No   Do you have cancer, leukemia, HIV/AIDS, or any other immune system problem?   No   Do you have a parent, brother, or sister with an immune system problem?   No   In the past 3 months, have you taken medications that affect  your immune system, such as prednisone, other steroids, or anticancer drugs; drugs for the treatment of rheumatoid arthritis, Crohn s disease, or psoriasis; or have you had radiation treatments?   No   Have you had a seizure, or a brain or other nervous system problem?   No   During the past year, have you received a transfusion of blood or blood    products, or been given immune (gamma) globulin or antiviral drug?   No   For women: Are you pregnant or is there a chance you could become       pregnant during the next month?   No   Have you received any vaccinations in the past 4 weeks?   No     Immunization questionnaire answers were all negative.        Per orders of Dr. Sharma, injection of TD, Flublok given by Demi Manzo. Patient instructed to remain in clinic for 15 minutes afterwards, and to report any adverse reaction to me immediately.       Screening performed by Demi Manzo on 9/20/2021 at 11:12 AM.

## 2021-09-20 NOTE — PROGRESS NOTES
"SUBJECTIVE:   Anita Bennett is a 61 year old female who presents for Preventive Visit. She is accompanied by her  and (our telephone service).       Patient has been advised of split billing requirements and indicates understanding: Yes   Are you in the first 12 months of your Medicare coverage?  No    Healthy Habits:     In general, how would you rate your overall health?  Fair    Frequency of exercise:  None    Do you usually eat at least 4 servings of fruit and vegetables a day, include whole grains    & fiber and avoid regularly eating high fat or \"junk\" foods?  No    Taking medications regularly:  No    Medication side effects:  None    Ability to successfully perform activities of daily living:  Telephone requires assistance, transportation requires assistance, shopping requires assistance, preparing meals requires assistance, housework requires assistance, bathing requires assistance, laundry requires assistance, medication administration requires assistance and money management requires assistance    Home Safety:  No safety concerns identified    Hearing Impairment:  No hearing concerns    In the past 6 months, have you been bothered by leaking of urine?  No    In general, how would you rate your overall mental or emotional health?  Good      PHQ-2 Total Score: 0    Do you feel safe in your environment? Yes    Have you ever done Advance Care Planning? (For example, a Health Directive, POLST, or a discussion with a medical provider or your loved ones about your wishes): No, advance care planning information given to patient to review.  Advanced care planning was discussed at today's visit.        Fall risk  Fallen 2 or more times in the past year?: No  Any fall with injury in the past year?: No    Cognitive Screening Not appropriate due to mental handicap (pt reports memory problems since her stroke)        Social History     Tobacco Use     Smoking status: Never Smoker     Smokeless tobacco: " Never Used   Substance Use Topics     Alcohol use: Yes         Alcohol Use 9/20/2021   Prescreen: >3 drinks/day or >7 drinks/week? No   Prescreen: >3 drinks/day or >7 drinks/week? -       Hypertension Follow-up      Do you check your blood pressure regularly outside of the clinic? Yes     Are you following a low salt diet? Yes    Are your blood pressures ever more than 140 on the top number (systolic) OR more   than 90 on the bottom number (diastolic), for example 140/90? No, SBP typically between 100-130    Depression Followup    How are you doing with your depression since your last visit? No change    Are you having other symptoms that might be associated with depression? No    Have you had a significant life event?  No     Are you feeling anxious or having panic attacks?   No    Do you have any concerns with your use of alcohol or other drugs? No    Social History     Tobacco Use     Smoking status: Never Smoker     Smokeless tobacco: Never Used   Substance Use Topics     Alcohol use: Yes     Drug use: No     PHQ 9/20/2021   PHQ-9 Total Score 2   Q9: Thoughts of better off dead/self-harm past 2 weeks Not at all     ABHISHEK-7 SCORE 5/11/2015   Total Score 3         Suicide Assessment Five-step Evaluation and Treatment (SAFE-T)      Current providers sharing in care for this patient include:   Patient Care Team:  Mulugeta Sharma MD as PCP - General (Family Practice)  James Jo OD as Assigned Surgical Provider  Cheng Jean MD as Assigned PCP    The following health maintenance items are reviewed in Epic and correct as of today:  Health Maintenance Due   Topic Date Due     ANNUAL REVIEW OF HM ORDERS  Never done     DEPRESSION ACTION PLAN  Never done     PHQ-9  11/11/2015     INFLUENZA VACCINE (1) 09/01/2021     Past medical, family, and social histories, medications, and allergies are reviewed and updated in [facility].       Review of Systems   Constitutional: Negative for chills and fever.   HENT:  "Negative for congestion, ear pain, hearing loss and sore throat.    Eyes: Negative for pain and visual disturbance.   Respiratory: Negative for cough and shortness of breath.    Cardiovascular: Negative for chest pain, palpitations and peripheral edema.   Gastrointestinal: Negative for abdominal pain, constipation, diarrhea, heartburn, hematochezia and nausea.   Breasts:  Negative for tenderness, breast mass and discharge.   Genitourinary: Negative for dysuria, frequency, genital sores, hematuria, pelvic pain, urgency, vaginal bleeding and vaginal discharge.   Musculoskeletal: Negative for arthralgias, joint swelling and myalgias.   Skin: Negative for rash.   Neurological: Negative for dizziness, weakness, headaches and paresthesias.   Psychiatric/Behavioral: Negative for mood changes. The patient is not nervous/anxious.          OBJECTIVE:   /65   Pulse 68   Temp 98.5  F (36.9  C) (Tympanic)   Wt 68.3 kg (150 lb 9.6 oz)   LMP  (LMP Unknown)   BMI 29.91 kg/m   Estimated body mass index is 29.91 kg/m  as calculated from the following:    Height as of 3/11/21: 1.511 m (4' 11.5\").    Weight as of this encounter: 68.3 kg (150 lb 9.6 oz).  Physical Exam  GENERAL APPEARANCE: healthy, alert and no distress  EYES: Eyes grossly normal to inspection, PERRL and conjunctivae and sclerae normal  HENT: ear canals and TM's normal, nose and mouth without ulcers or lesions, oropharynx clear and oral mucous membranes moist  NECK: no adenopathy, no asymmetry, masses, or scars and thyroid normal to palpation  RESP: lungs clear to auscultation - no rales, rhonchi or wheezes  CV: regular rate and rhythm, normal S1 S2, no S3 or S4, no murmur, click or rub, no peripheral edema and peripheral pulses strong  ABDOMEN: soft, nontender, no hepatosplenomegaly, no masses and bowel sounds normal  MS: no musculoskeletal defects are noted and gait is age appropriate without ataxia  SKIN: no suspicious lesions or rashes  NEURO: cranial " nerves 2-12 intact, oriented times 3, gait abnormal: seated in wheelchair since she is not ambulatory, increased tone RUE and wears a brace on RLE  PSYCH: mentation appears normal and affect normal/bright      ASSESSMENT / PLAN:   (Z00.00) Encounter for Medicare annual wellness exam  (primary encounter diagnosis)  Comment:   Plan: INFLUENZA QUAD, RECOMBINANT, P-FREE (RIV4)         (FLUBLOK), TD PRESERV FREE, IM (7+ YRS)         (DECAVAC/TENIVAC)        F/u 1 year    (F33.0) Mild episode of recurrent major depressive disorder (H)  Comment: well controlled  Plan: DEPRESSION ACTION PLAN (DAP)        Continue escitalopram. Return in about 53 weeks (around 9/26/2022) for Annual Wellness Visit.      (I10) Essential hypertension with goal blood pressure less than 140/90  Comment: well controlled  Plan: hydrochlorothiazide (HYDRODIURIL) 12.5 MG         tablet, losartan (COZAAR) 50 MG tablet,         metoprolol succinate ER (TOPROL-XL) 50 MG 24 hr        tablet, CBC with platelets, Basic metabolic         panel        Return in about 53 weeks (around 9/26/2022) for Annual Wellness Visit.      (I69.920) Aphasia, late effect of cerebrovascular disease  (Z86.73) History of CVA (cerebrovascular accident)  Comment: also right hemiplegia, managed by Neurology (botox injections, etc.). Needs lab update for simvastatin   Plan: simvastatin (ZOCOR) 20 MG tablet, AST, Lipid         panel reflex to direct LDL Non-fasting, ALT          (Z23) Need for vaccination  Comment:   Plan: INFLUENZA QUAD, RECOMBINANT, P-FREE (RIV4)         (FLUBLOK), TD PRESERV FREE, IM (7+ YRS)         (DECAVAC/TENIVAC)            (Z12.11) Colon cancer screening  Comment: 10 years next May  Plan: Adult Gastro Ref - Procedure Only                COUNSELING:  Reviewed preventive health counseling, as reflected in patient instructions       Vision screening       Immunizations    Vaccinated for: Influenza and Td             Colon cancer screening       Breast cancer  "screening    Estimated body mass index is 29.91 kg/m  as calculated from the following:    Height as of 3/11/21: 1.511 m (4' 11.5\").    Weight as of this encounter: 68.3 kg (150 lb 9.6 oz).    Weight management plan: Discussed healthy diet and exercise guidelines    She reports that she has never smoked. She has never used smokeless tobacco.      Appropriate preventive services were discussed with this patient, including applicable screening as appropriate for cardiovascular disease, diabetes, osteopenia/osteoporosis, and glaucoma.  As appropriate for age/gender, discussed screening for colorectal cancer, prostate cancer, breast cancer, and cervical cancer. Checklist reviewing preventive services available has been given to the patient.    Reviewed patients plan of care and provided an AVS. The Basic Care Plan (routine screening as documented in Health Maintenance) for Anita meets the Care Plan requirement. This Care Plan has been established and reviewed with the Patient and spouse.    Counseling Resources:  ATP IV Guidelines  Pooled Cohorts Equation Calculator  Breast Cancer Risk Calculator  Breast Cancer: Medication to Reduce Risk  FRAX Risk Assessment  ICSI Preventive Guidelines  Dietary Guidelines for Americans, 2010  [employer]'s MyPlate  ASA Prophylaxis  Lung CA Screening    Mulugeta Sharma MD  [facility]    Identified Health Risks:  Answers for HPI/ROS submitted by the patient on 9/20/2021  If you checked off any problems, how difficult have these problems made it for you to do your work, take care of things at home, or get along with other people?: Not difficult at all  PHQ9 TOTAL SCORE: 2      "

## 2021-09-21 ENCOUNTER — OFFICE VISIT (OUTPATIENT)
Dept: PHYSICAL MEDICINE AND REHAB | Facility: CLINIC | Age: 61
End: 2021-09-21
Payer: MEDICARE

## 2021-09-21 VITALS
OXYGEN SATURATION: 94 % | HEART RATE: 58 BPM | DIASTOLIC BLOOD PRESSURE: 83 MMHG | TEMPERATURE: 98 F | RESPIRATION RATE: 16 BRPM | SYSTOLIC BLOOD PRESSURE: 129 MMHG

## 2021-09-21 DIAGNOSIS — G81.11 RIGHT SPASTIC HEMIPARESIS (H): Primary | ICD-10-CM

## 2021-09-21 DIAGNOSIS — R26.9 ABNORMAL GAIT: ICD-10-CM

## 2021-09-21 DIAGNOSIS — M43.6 TORTICOLLIS: ICD-10-CM

## 2021-09-21 PROCEDURE — 64644 CHEMODENERV 1 EXTREM 5/> MUS: CPT | Performed by: PHYSICAL MEDICINE & REHABILITATION

## 2021-09-21 PROCEDURE — 64646 CHEMODENERV TRUNK MUSC 1-5: CPT | Performed by: PHYSICAL MEDICINE & REHABILITATION

## 2021-09-21 PROCEDURE — 64643 CHEMODENERV 1 EXTREM 1-4 EA: CPT | Performed by: PHYSICAL MEDICINE & REHABILITATION

## 2021-09-21 PROCEDURE — 95874 GUIDE NERV DESTR NEEDLE EMG: CPT | Performed by: PHYSICAL MEDICINE & REHABILITATION

## 2021-09-21 PROCEDURE — 99213 OFFICE O/P EST LOW 20 MIN: CPT | Mod: 25 | Performed by: PHYSICAL MEDICINE & REHABILITATION

## 2021-09-21 ASSESSMENT — PATIENT HEALTH QUESTIONNAIRE - PHQ9: SUM OF ALL RESPONSES TO PHQ QUESTIONS 1-9: 2

## 2021-09-21 NOTE — PROGRESS NOTES
9/21/2021     The patient returns for a follow-up visit for her ongoing issues related to spastic hemiparesis affecting the right side.     She has been  seen by me previously at [facility] [place].      Last seen here on 6/22/2021. Botox done helped.     She has been complaining of pain in the left side of the neck and the lower back.  She has previously responded nicely to injection therapy with medial branch blocks to the lumbar and cervical region.  Both diagnostic and confirmatory blocks were done with good relief.  This was done in July and August 2019.  She has done physical therapy without relief and continues to do home exercise program without relief.  We will seek authorization for repeating the injections with the plan for radiofrequency ablations if she gets good relief as expected.  We will go from lumbar L3-S1 and cervical from C2-C5 including the third occipital nerve.     She reports her function is affected and she needs a little more help.  She has history of sadness and is doing quite well currently and  is tolerating her antidepressant.        Past Medical History:   Diagnosis Date     Acute ischemic left MCA stroke (H) 3/22/15     Essential hypertension, benign               Current Outpatient Medications   Medication Sig Dispense Refill     aspirin (ASA) 325 MG tablet TAKE 1 TABLET BY MOUTH EVERY DAY 90 tablet 3     azelastine (OPTIVAR) 0.05 % ophthalmic solution Place 1 drop into both eyes 2 times daily as needed (for itchy water eyes) 5 mL 11     baclofen (LIORESAL) 20 MG tablet Take 1 tablet (20 mg) by mouth 3 times daily 270 tablet 3     escitalopram (LEXAPRO) 10 MG tablet Take 1 tablet (10 mg) by mouth daily 30 tablet 11     gabapentin (NEURONTIN) 400 MG capsule Take 1 capsule (400 mg) by mouth 3 times daily for arm mobility. 90 capsule 11     hydrochlorothiazide (HYDRODIURIL) 12.5 MG tablet Take 1 tablet (12.5 mg) by mouth daily as needed for blood pressure. 90 tablet 1      losartan (COZAAR) 50 MG tablet Take 1 tablet (50 mg) by mouth daily as needed for blood pressure. 90 tablet 1     metoprolol succinate ER (TOPROL-XL) 50 MG 24 hr tablet Take 1 tablet (50 mg) by mouth daily for blood pressure. 90 tablet 1     nabumetone (RELAFEN) 500 MG tablet TAKE 1-2 TABLETS (500-1,000 MG) BY MOUTH 2 TIMES DAILY AS NEEDED FOR PAIN IN ARM OR HIP WITH FOOD 60 tablet 1     pantoprazole (PROTONIX) 40 MG EC tablet Take 1 tablet (40 mg) by mouth daily for reflux. 90 tablet 3     SENEXON-S 8.6-50 MG tablet TAKE 1 TABLET BY MOUTH DAILY AS NEEDED FOR CONSTIPATION 30 tablet 11     simvastatin (ZOCOR) 20 MG tablet Take 1 tablet (20 mg) by mouth every evening for cholesterol. 90 tablet 1     triamcinolone (KENALOG) 0.1 % cream Apply sparingly to affected area three times daily as needed 80 g 0     VITAMIN D3 25 MCG (1000 UT) tablet TAKE 1 TABLET BY MOUTH EVERY DAY 30 tablet 10              Resp 16   LMP  (LMP Unknown)           On examination, the patient is in her manual wheelchair.  She transfers with assist of her .  She has involvement of right levator scapula, right pectoralis major, right biceps brachii, flexor carpi ulnaris and flexor carpi radialis, flexor digitorum superficialis and flexor digitorum profundus.  She has increased tone in the flexor pollicis longus and pronator teres  And lumbricals. In the lower extremity tibialis posterior and gastrocnemius are tight.     Impression: Right spastic hemiparesis, torticollis, gait abnormality and spasm of muscles due to cerebrovascular accident.       Based on today's assessment, she would benefit from 600 units of Botox injections.  I will see her in follow-up in about a month's time to see how she is responding and plan future treatments every 12 weeks.     20 minutes for the evaluation and management portion of the visit, greater than 50% was for counseling on hemiparesis and treatment.     Procedure note: With her informed consent, after  explaining the benefits and risks of the procedure, and using Betasept for skin prep, EMG for localization, preservative-free normal saline for dilution, 600 units of Botox, 100 units were injected into the right pectoralis major,  25 needs to right flexor pollicis longus, 35 unis for injected into the following muscles lumbricals/PI,  flexor carpi radialis, flexor carpi ulnaris, flexor digitorum  Superficialis, flexor digitorum profundus.  50 units each were injected into the biceps brachii,  tibialis posterior, FDL, EHL and 100 units into the gastrocnemius.. 600 units were utilized in all.  She tolerated it well.  She will use ice, Tylenol as needed.     Cheng Jean MD

## 2021-09-21 NOTE — LETTER
9/21/2021       RE: Anita Bennett  [address]  [place] [address]     Dear Colleague,    Thank you for referring your patient, Anita Bennett, to the [facility] PHYSICAL MEDICINE AND REHABILITATION CLINIC [place] at [facility]. Please see a copy of my visit note below.    9/21/2021     The patient returns for a follow-up visit for her ongoing issues related to spastic hemiparesis affecting the right side.     She has been  seen by me previously at [facility] stroke [place].      Last seen here on 6/22/2021. Botox done helped.     She has been complaining of pain in the left side of the neck and the lower back.  She has previously responded nicely to injection therapy with medial branch blocks to the lumbar and cervical region.  Both diagnostic and confirmatory blocks were done with good relief.  This was done in July and August 2019.  She has done physical therapy without relief and continues to do home exercise program without relief.  We will seek authorization for repeating the injections with the plan for radiofrequency ablations if she gets good relief as expected.  We will go from lumbar L3-S1 and cervical from C2-C5 including the third occipital nerve.     She reports her function is affected and she needs a little more help.  She has history of sadness and is doing quite well currently and  is tolerating her antidepressant.        Past Medical History:   Diagnosis Date     Acute ischemic left MCA stroke (H) 3/22/15     Essential hypertension, benign               Current Outpatient Medications   Medication Sig Dispense Refill     aspirin (ASA) 325 MG tablet TAKE 1 TABLET BY MOUTH EVERY DAY 90 tablet 3     azelastine (OPTIVAR) 0.05 % ophthalmic solution Place 1 drop into both eyes 2 times daily as needed (for itchy water eyes) 5 mL 11     baclofen (LIORESAL) 20 MG tablet Take 1 tablet (20 mg) by mouth 3 times daily 270 tablet 3     escitalopram  (LEXAPRO) 10 MG tablet Take 1 tablet (10 mg) by mouth daily 30 tablet 11     gabapentin (NEURONTIN) 400 MG capsule Take 1 capsule (400 mg) by mouth 3 times daily for arm mobility. 90 capsule 11     hydrochlorothiazide (HYDRODIURIL) 12.5 MG tablet Take 1 tablet (12.5 mg) by mouth daily as needed for blood pressure. 90 tablet 1     losartan (COZAAR) 50 MG tablet Take 1 tablet (50 mg) by mouth daily as needed for blood pressure. 90 tablet 1     metoprolol succinate ER (TOPROL-XL) 50 MG 24 hr tablet Take 1 tablet (50 mg) by mouth daily for blood pressure. 90 tablet 1     nabumetone (RELAFEN) 500 MG tablet TAKE 1-2 TABLETS (500-1,000 MG) BY MOUTH 2 TIMES DAILY AS NEEDED FOR PAIN IN ARM OR HIP WITH FOOD 60 tablet 1     pantoprazole (PROTONIX) 40 MG EC tablet Take 1 tablet (40 mg) by mouth daily for reflux. 90 tablet 3     SENEXON-S 8.6-50 MG tablet TAKE 1 TABLET BY MOUTH DAILY AS NEEDED FOR CONSTIPATION 30 tablet 11     simvastatin (ZOCOR) 20 MG tablet Take 1 tablet (20 mg) by mouth every evening for cholesterol. 90 tablet 1     triamcinolone (KENALOG) 0.1 % cream Apply sparingly to affected area three times daily as needed 80 g 0     VITAMIN D3 25 MCG (1000 UT) tablet TAKE 1 TABLET BY MOUTH EVERY DAY 30 tablet 10              Resp 16   LMP  (LMP Unknown)           On examination, the patient is in her manual wheelchair.  She transfers with assist of her .  She has involvement of right levator scapula, right pectoralis major, right biceps brachii, flexor carpi ulnaris and flexor carpi radialis, flexor digitorum superficialis and flexor digitorum profundus.  She has increased tone in the flexor pollicis longus and pronator teres  And lumbricals. In the lower extremity tibialis posterior and gastrocnemius are tight.     Impression: Right spastic hemiparesis, torticollis, gait abnormality and spasm of muscles due to cerebrovascular accident.       Based on today's assessment, she would benefit from 600 units of Botox  injections.  I will see her in follow-up in about a month's time to see how she is responding and plan future treatments every 12 weeks.     20 minutes for the evaluation and management portion of the visit, greater than 50% was for counseling on hemiparesis and treatment.     Procedure note: With her informed consent, after explaining the benefits and risks of the procedure, and using Betasept for skin prep, EMG for localization, preservative-free normal saline for dilution, 600 units of Botox, 100 units were injected into the right pectoralis major,  25 needs to right flexor pollicis longus, 35 unis for injected into the following muscles lumbricals/PI,  flexor carpi radialis, flexor carpi ulnaris, flexor digitorum  Superficialis, flexor digitorum profundus.  50 units each were injected into the biceps brachii,  tibialis posterior, FDL, EHL and 100 units into the gastrocnemius.. 600 units were utilized in all.  She tolerated it well.  She will use ice, Tylenol as needed.     Cheng Jean MD         Again, thank you for allowing me to participate in the care of your patient.      Sincerely,    Cheng Jean MD

## 2021-09-24 ENCOUNTER — TELEPHONE (OUTPATIENT)
Dept: GASTROENTEROLOGY | Facility: CLINIC | Age: 61
End: 2021-09-24

## 2021-10-08 DIAGNOSIS — E55.9 VITAMIN D DEFICIENCY: ICD-10-CM

## 2021-10-11 RX ORDER — CHOLECALCIFEROL (VITAMIN D3) 25 MCG
TABLET ORAL
Qty: 30 TABLET | Refills: 10 | Status: SHIPPED | OUTPATIENT
Start: 2021-10-11 | End: 2022-08-09

## 2021-10-16 DIAGNOSIS — Z53.9 DIAGNOSIS NOT YET DEFINED: ICD-10-CM

## 2021-10-19 RX ORDER — DOCUSATE SODIUM 50MG AND SENNOSIDES 8.6MG 8.6; 5 MG/1; MG/1
TABLET, FILM COATED ORAL
Qty: 30 TABLET | Refills: 4 | Status: SHIPPED | OUTPATIENT
Start: 2021-10-19 | End: 2022-08-09

## 2021-10-28 DIAGNOSIS — Z86.73 PERSONAL HISTORY OF TRANSIENT CEREBRAL ISCHEMIA: ICD-10-CM

## 2021-10-28 DIAGNOSIS — M79.601 RIGHT UPPER LIMB PAIN: ICD-10-CM

## 2021-10-28 DIAGNOSIS — M25.511 RIGHT SHOULDER PAIN, UNSPECIFIED CHRONICITY: ICD-10-CM

## 2021-10-28 RX ORDER — ASPIRIN 325 MG
TABLET ORAL
Qty: 90 TABLET | Refills: 1 | Status: SHIPPED | OUTPATIENT
Start: 2021-10-28 | End: 2022-03-28

## 2021-10-31 RX ORDER — NABUMETONE 500 MG/1
TABLET, FILM COATED ORAL
Qty: 60 TABLET | Refills: 1 | Status: SHIPPED | OUTPATIENT
Start: 2021-10-31 | End: 2021-12-18

## 2021-11-08 ENCOUNTER — ANCILLARY PROCEDURE (OUTPATIENT)
Dept: MAMMOGRAPHY | Facility: CLINIC | Age: 61
End: 2021-11-08
Attending: FAMILY MEDICINE
Payer: MEDICARE

## 2021-11-08 DIAGNOSIS — Z12.31 VISIT FOR SCREENING MAMMOGRAM: ICD-10-CM

## 2021-11-08 PROCEDURE — 77067 SCR MAMMO BI INCL CAD: CPT | Performed by: STUDENT IN AN ORGANIZED HEALTH CARE EDUCATION/TRAINING PROGRAM

## 2021-12-07 ENCOUNTER — OFFICE VISIT (OUTPATIENT)
Dept: PHYSICAL MEDICINE AND REHAB | Facility: CLINIC | Age: 61
End: 2021-12-07
Payer: MEDICARE

## 2021-12-07 VITALS
OXYGEN SATURATION: 98 % | HEART RATE: 54 BPM | TEMPERATURE: 98 F | SYSTOLIC BLOOD PRESSURE: 142 MMHG | DIASTOLIC BLOOD PRESSURE: 62 MMHG | RESPIRATION RATE: 16 BRPM

## 2021-12-07 DIAGNOSIS — R26.9 ABNORMAL GAIT: ICD-10-CM

## 2021-12-07 DIAGNOSIS — M47.817 LUMBOSACRAL SPONDYLOSIS WITHOUT MYELOPATHY: ICD-10-CM

## 2021-12-07 DIAGNOSIS — M62.838 SPASM OF MUSCLE: ICD-10-CM

## 2021-12-07 DIAGNOSIS — M47.817 LUMBOSACRAL SPONDYLOSIS WITHOUT MYELOPATHY: Primary | ICD-10-CM

## 2021-12-07 DIAGNOSIS — G81.11 RIGHT SPASTIC HEMIPARESIS (H): Primary | ICD-10-CM

## 2021-12-07 DIAGNOSIS — G89.29 CHRONIC GLUTEAL PAIN: ICD-10-CM

## 2021-12-07 DIAGNOSIS — M47.812 CERVICAL SPONDYLOSIS WITHOUT MYELOPATHY: ICD-10-CM

## 2021-12-07 DIAGNOSIS — M79.18 CHRONIC GLUTEAL PAIN: ICD-10-CM

## 2021-12-07 PROCEDURE — 64646 CHEMODENERV TRUNK MUSC 1-5: CPT | Mod: 59 | Performed by: PHYSICAL MEDICINE & REHABILITATION

## 2021-12-07 PROCEDURE — 64643 CHEMODENERV 1 EXTREM 1-4 EA: CPT | Mod: 59 | Performed by: PHYSICAL MEDICINE & REHABILITATION

## 2021-12-07 PROCEDURE — 95874 GUIDE NERV DESTR NEEDLE EMG: CPT | Performed by: PHYSICAL MEDICINE & REHABILITATION

## 2021-12-07 PROCEDURE — 99213 OFFICE O/P EST LOW 20 MIN: CPT | Mod: 25 | Performed by: PHYSICAL MEDICINE & REHABILITATION

## 2021-12-07 PROCEDURE — 96372 THER/PROPH/DIAG INJ SC/IM: CPT | Performed by: PHYSICAL MEDICINE & REHABILITATION

## 2021-12-07 PROCEDURE — 64644 CHEMODENERV 1 EXTREM 5/> MUS: CPT | Mod: 59 | Performed by: PHYSICAL MEDICINE & REHABILITATION

## 2021-12-07 NOTE — PROGRESS NOTES
The patient returns for a follow-up visit for her ongoing issues related to spastic hemiparesis affecting the right side.     She has been  seen by me previously at [facility] stroke [place].      Last seen here on 9/21/2021. Botox done helped.     She has been complaining of pain in the left side of the neck and the lower back.  She has previously responded nicely to injection therapy with medial branch blocks to the lumbar and cervical region.  Both diagnostic and confirmatory blocks were done with good relief.  This was done in July and August 2019.  She has done physical therapy without relief and continues to do home exercise program without relief.  We will seek authorization for repeating the injections with the plan for radiofrequency ablations if she gets good relief as expected.  We will go for left lumbar L3-S1 and later for left cervical from C2-C5 including the third occipital nerve. Pain is >5/10, worse with transfers.     She reports her function is affected and she needs a little more help.  She has history of sadness and is doing quite well currently and  is tolerating her antidepressant.        Past Medical History:   Diagnosis Date     Acute ischemic left MCA stroke (H) 3/22/15     Essential hypertension, benign      Past Surgical History:   Procedure Laterality Date     NO HISTORY OF SURGERY       Current Outpatient Medications   Medication Sig Dispense Refill     aspirin (ASA) 325 MG tablet TAKE 1 TABLET BY MOUTH EVERY DAY 90 tablet 1     azelastine (OPTIVAR) 0.05 % ophthalmic solution Place 1 drop into both eyes 2 times daily as needed (for itchy water eyes) 5 mL 11     baclofen (LIORESAL) 20 MG tablet Take 1 tablet (20 mg) by mouth 3 times daily 270 tablet 3     escitalopram (LEXAPRO) 10 MG tablet Take 1 tablet (10 mg) by mouth daily 30 tablet 11     gabapentin (NEURONTIN) 400 MG capsule Take 1 capsule (400 mg) by mouth 3 times daily for arm mobility. 90 capsule 11     hydrochlorothiazide  (HYDRODIURIL) 12.5 MG tablet Take 1 tablet (12.5 mg) by mouth daily as needed for blood pressure. 90 tablet 1     losartan (COZAAR) 50 MG tablet Take 1 tablet (50 mg) by mouth daily as needed for blood pressure. 90 tablet 1     metoprolol succinate ER (TOPROL-XL) 50 MG 24 hr tablet Take 1 tablet (50 mg) by mouth daily for blood pressure. 90 tablet 1     nabumetone (RELAFEN) 500 MG tablet TAKE 1-2 TABLETS (500-1,000 MG) BY MOUTH 2 TIMES DAILY AS NEEDED FOR PAIN IN ARM OR HIP WITH FOOD 60 tablet 1     pantoprazole (PROTONIX) 40 MG EC tablet Take 1 tablet (40 mg) by mouth daily for reflux. 90 tablet 3     SENEXON-S 8.6-50 MG tablet TAKE 1 TABLET BY MOUTH DAILY AS NEEDED FOR CONSTIPATION 30 tablet 4     simvastatin (ZOCOR) 20 MG tablet Take 1 tablet (20 mg) by mouth every evening for cholesterol. 90 tablet 1     triamcinolone (KENALOG) 0.1 % cream Apply sparingly to affected area three times daily as needed 80 g 0     VITAMIN D3 25 MCG (1000 UT) tablet TAKE 1 TABLET BY MOUTH EVERY DAY 30 tablet 10                      BP (!) 142/62   Pulse 54   Temp 98  F (36.7  C)   Resp 16   LMP  (LMP Unknown)   SpO2 98%             On examination, the patient is in her manual wheelchair.  She transfers with assist of her .  She has involvement of right levator scapula, right pectoralis major, right biceps brachii, flexor carpi ulnaris and flexor carpi radialis, flexor digitorum superficialis and flexor digitorum profundus.  She has increased tone in the flexor pollicis longus and pronator teres  And lumbricals. In the lower extremity tibialis posterior and gastrocnemius are tight.     Impression: Right spastic hemiparesis, torticollis, gait abnormality and spasm of muscles due to cerebrovascular accident.       Based on today's assessment, she would benefit from 600 units of Botox injections.  I will see her in follow-up in about a month's time to see how she is responding and plan future treatments every 12  weeks.     20 minutes for the evaluation and management portion of the visit, greater than 50% was for counseling on hemiparesis and treatment.     Procedure note: With her informed consent, after explaining the benefits and risks of the procedure, and using Betasept for skin prep, EMG for localization, preservative-free normal saline for dilution, 600 units of Botox, 100 units were injected into the right pectoralis major,  25 needs to right flexor pollicis longus, 35 unis for injected into the following muscles lumbricals/PI,  flexor carpi radialis, flexor carpi ulnaris, flexor digitorum  Superficialis, flexor digitorum profundus.  50 units each were injected into the biceps brachii,  tibialis posterior, FDL, EHL and 100 units into the gastrocnemius.. 600 units were utilized in all.  She tolerated it well.  She will use ice, Tylenol as needed.     Cheng Jean MD

## 2021-12-07 NOTE — NURSING NOTE
Chief Complaint   Patient presents with     Botox     [facility] RETURN BOTOX       Christian Nolasco, EMT

## 2021-12-07 NOTE — LETTER
12/7/2021       RE: Anita Bennett  [address]  [place] [address]     Dear Colleague,    Thank you for referring your patient, Anita Bennett, to the [facility] PHYSICAL MEDICINE AND REHABILITATION CLINIC [place] at [facility]. Please see a copy of my visit note below.    The patient returns for a follow-up visit for her ongoing issues related to spastic hemiparesis affecting the right side.     She has been  seen by me previously at [facility] stroke [place].      Last seen here on 9/21/2021. Botox done helped.     She has been complaining of pain in the left side of the neck and the lower back.  She has previously responded nicely to injection therapy with medial branch blocks to the lumbar and cervical region.  Both diagnostic and confirmatory blocks were done with good relief.  This was done in July and August 2019.  She has done physical therapy without relief and continues to do home exercise program without relief.  We will seek authorization for repeating the injections with the plan for radiofrequency ablations if she gets good relief as expected.  We will go for left lumbar L3-S1 and later for left cervical from C2-C5 including the third occipital nerve. Pain is >5/10, worse with transfers.     She reports her function is affected and she needs a little more help.  She has history of sadness and is doing quite well currently and  is tolerating her antidepressant.        Past Medical History:   Diagnosis Date     Acute ischemic left MCA stroke (H) 3/22/15     Essential hypertension, benign      Past Surgical History:   Procedure Laterality Date     NO HISTORY OF SURGERY       Current Outpatient Medications   Medication Sig Dispense Refill     aspirin (ASA) 325 MG tablet TAKE 1 TABLET BY MOUTH EVERY DAY 90 tablet 1     azelastine (OPTIVAR) 0.05 % ophthalmic solution Place 1 drop into both eyes 2 times daily as needed (for itchy water eyes) 5 mL  11     baclofen (LIORESAL) 20 MG tablet Take 1 tablet (20 mg) by mouth 3 times daily 270 tablet 3     escitalopram (LEXAPRO) 10 MG tablet Take 1 tablet (10 mg) by mouth daily 30 tablet 11     gabapentin (NEURONTIN) 400 MG capsule Take 1 capsule (400 mg) by mouth 3 times daily for arm mobility. 90 capsule 11     hydrochlorothiazide (HYDRODIURIL) 12.5 MG tablet Take 1 tablet (12.5 mg) by mouth daily as needed for blood pressure. 90 tablet 1     losartan (COZAAR) 50 MG tablet Take 1 tablet (50 mg) by mouth daily as needed for blood pressure. 90 tablet 1     metoprolol succinate ER (TOPROL-XL) 50 MG 24 hr tablet Take 1 tablet (50 mg) by mouth daily for blood pressure. 90 tablet 1     nabumetone (RELAFEN) 500 MG tablet TAKE 1-2 TABLETS (500-1,000 MG) BY MOUTH 2 TIMES DAILY AS NEEDED FOR PAIN IN ARM OR HIP WITH FOOD 60 tablet 1     pantoprazole (PROTONIX) 40 MG EC tablet Take 1 tablet (40 mg) by mouth daily for reflux. 90 tablet 3     SENEXON-S 8.6-50 MG tablet TAKE 1 TABLET BY MOUTH DAILY AS NEEDED FOR CONSTIPATION 30 tablet 4     simvastatin (ZOCOR) 20 MG tablet Take 1 tablet (20 mg) by mouth every evening for cholesterol. 90 tablet 1     triamcinolone (KENALOG) 0.1 % cream Apply sparingly to affected area three times daily as needed 80 g 0     VITAMIN D3 25 MCG (1000 UT) tablet TAKE 1 TABLET BY MOUTH EVERY DAY 30 tablet 10                      BP (!) 142/62   Pulse 54   Temp 98  F (36.7  C)   Resp 16   LMP  (LMP Unknown)   SpO2 98%             On examination, the patient is in her manual wheelchair.  She transfers with assist of her .  She has involvement of right levator scapula, right pectoralis major, right biceps brachii, flexor carpi ulnaris and flexor carpi radialis, flexor digitorum superficialis and flexor digitorum profundus.  She has increased tone in the flexor pollicis longus and pronator teres  And lumbricals. In the lower extremity tibialis posterior and gastrocnemius are  tight.     Impression: Right spastic hemiparesis, torticollis, gait abnormality and spasm of muscles due to cerebrovascular accident.       Based on today's assessment, she would benefit from 600 units of Botox injections.  I will see her in follow-up in about a month's time to see how she is responding and plan future treatments every 12 weeks.     20 minutes for the evaluation and management portion of the visit, greater than 50% was for counseling on hemiparesis and treatment.     Procedure note: With her informed consent, after explaining the benefits and risks of the procedure, and using Betasept for skin prep, EMG for localization, preservative-free normal saline for dilution, 600 units of Botox, 100 units were injected into the right pectoralis major,  25 needs to right flexor pollicis longus, 35 unis for injected into the following muscles lumbricals/PI,  flexor carpi radialis, flexor carpi ulnaris, flexor digitorum  Superficialis, flexor digitorum profundus.  50 units each were injected into the biceps brachii,  tibialis posterior, FDL, EHL and 100 units into the gastrocnemius.. 600 units were utilized in all.  She tolerated it well.  She will use ice, Tylenol as needed.     Cheng Jean MD

## 2021-12-08 DIAGNOSIS — M25.511 RIGHT SHOULDER PAIN, UNSPECIFIED CHRONICITY: ICD-10-CM

## 2021-12-08 DIAGNOSIS — M79.601 RIGHT UPPER LIMB PAIN: ICD-10-CM

## 2021-12-08 NOTE — TELEPHONE ENCOUNTER
"Requested Prescriptions   Pending Prescriptions Disp Refills    nabumetone (RELAFEN) 500 MG tablet [Pharmacy Med Name: NABUMETONE 500 MG TABLET] 60 tablet 1     Sig: TAKE 1-2 TABLETS (500-1,000 MG) BY MOUTH 2 TIMES DAILY AS NEEDED FOR PAIN IN ARM OR HIP WITH FOOD        NSAID Medications Failed - 12/8/2021  3:26 PM        Failed - Blood pressure under 140/90 in past 12 months       BP Readings from Last 3 Encounters:   12/07/21 (!) 142/62   09/21/21 129/83   09/20/21 102/65                 Failed - Normal CBC on file in past 12 months     Recent Labs   Lab Test 09/20/21  1115   WBC 4.4   RBC 3.73*   HGB 12.3   HCT 36.9                      Passed - Normal ALT on file in past 12 months     Recent Labs   Lab Test 09/20/21  1115   ALT 47               Passed - Normal AST on file in past 12 months       Recent Labs   Lab Test 09/20/21  1115   AST 28             Passed - Recent (12 mo) or future (30 days) visit within the authorizing provider's specialty     Patient has had an office visit with the authorizing provider or a provider within the authorizing providers department within the previous 12 mos or has a future within next 30 days. See \"Patient Info\" tab in inbasket, or \"Choose Columns\" in Meds & Orders section of the refill encounter.              Passed - Patient is age 6-64 years        Passed - Medication is active on med list        Passed - No active pregnancy on record        Passed - Normal serum creatinine on file in past 12 months     Recent Labs   Lab Test 09/20/21  1115   CR 1.02       Ok to refill medication if creatinine is low          Passed - No positive pregnancy test in past 12 months              "

## 2021-12-17 DIAGNOSIS — Z11.59 ENCOUNTER FOR SCREENING FOR OTHER VIRAL DISEASES: ICD-10-CM

## 2021-12-17 PROBLEM — M47.812 CERVICAL SPONDYLOSIS WITHOUT MYELOPATHY: Status: ACTIVE | Noted: 2021-12-17

## 2021-12-18 RX ORDER — NABUMETONE 500 MG/1
TABLET, FILM COATED ORAL
Qty: 60 TABLET | Refills: 1 | Status: SHIPPED | OUTPATIENT
Start: 2021-12-18 | End: 2022-01-28

## 2022-01-03 ENCOUNTER — TELEPHONE (OUTPATIENT)
Dept: PHYSICAL MEDICINE AND REHAB | Facility: CLINIC | Age: 62
End: 2022-01-03
Payer: MEDICARE

## 2022-01-03 NOTE — TELEPHONE ENCOUNTER
PA Initiation    Medication: BOTOX-RX BENEFITS  Insurance Company: [employer] - Phone [phone] Fax [phone]  Pharmacy Filling the Rx: [facility] - [place], [place] - [facility] [address]  Filling Pharmacy Phone: [phone]  Filling Pharmacy Fax: [phone]  Start Date: 1/3/2022

## 2022-01-06 NOTE — TELEPHONE ENCOUNTER
Prior Authorization Approval    Authorization Effective Date: 1/3/2022  Authorization Expiration Date:  Until further notice  Medication: BOTOX-RX BENEFITS  Approved Dose/Quantity: 200 units  Reference #: [id number]   Insurance Company: [employer] - Phone [phone] Fax [phone]  Expected CoPay:       CoPay Card Available:      Foundation Assistance Needed:    Which Pharmacy is filling the prescription (Not needed for infusion/clinic administered): [facility] - [place], [place]  [facility] [address]  Pharmacy Notified: Yes  Patient Notified:

## 2022-01-07 DIAGNOSIS — M25.511 RIGHT SHOULDER PAIN, UNSPECIFIED CHRONICITY: ICD-10-CM

## 2022-01-07 DIAGNOSIS — K21.9 GASTROESOPHAGEAL REFLUX DISEASE WITHOUT ESOPHAGITIS: ICD-10-CM

## 2022-01-07 DIAGNOSIS — M79.601 RIGHT UPPER LIMB PAIN: ICD-10-CM

## 2022-01-09 DIAGNOSIS — I10 ESSENTIAL HYPERTENSION WITH GOAL BLOOD PRESSURE LESS THAN 140/90: ICD-10-CM

## 2022-01-09 DIAGNOSIS — Z86.73 HISTORY OF CVA (CEREBROVASCULAR ACCIDENT): ICD-10-CM

## 2022-01-09 DIAGNOSIS — I69.920 APHASIA, LATE EFFECT OF CEREBROVASCULAR DISEASE: ICD-10-CM

## 2022-01-10 RX ORDER — PANTOPRAZOLE SODIUM 40 MG/1
40 TABLET, DELAYED RELEASE ORAL DAILY
Qty: 90 TABLET | Refills: 2 | Status: SHIPPED | OUTPATIENT
Start: 2022-01-10 | End: 2022-03-21

## 2022-01-10 NOTE — TELEPHONE ENCOUNTER
"Requested Prescriptions   Pending Prescriptions Disp Refills    nabumetone (RELAFEN) 500 MG tablet [Pharmacy Med Name: NABUMETONE 500 MG TABLET] 60 tablet 1     Sig: TAKE 1-2 TABLETS (500-1,000 MG) BY MOUTH 2 TIMES DAILY AS NEEDED FOR PAIN IN ARM OR HIP WITH FOOD        NSAID Medications Failed - 1/7/2022 11:18 PM        Failed - Blood pressure under 140/90 in past 12 months       BP Readings from Last 3 Encounters:   12/07/21 (!) 142/62   09/21/21 129/83   09/20/21 102/65                 Failed - Normal CBC on file in past 12 months     Recent Labs   Lab Test 09/20/21  1115   WBC 4.4   RBC 3.73*   HGB 12.3   HCT 36.9                      Passed - Normal ALT on file in past 12 months     Recent Labs   Lab Test 09/20/21  1115   ALT 47               Passed - Normal AST on file in past 12 months       Recent Labs   Lab Test 09/20/21  1115   AST 28             Passed - Recent (12 mo) or future (30 days) visit within the authorizing provider's specialty     Patient has had an office visit with the authorizing provider or a provider within the authorizing providers department within the previous 12 mos or has a future within next 30 days. See \"Patient Info\" tab in inbasket, or \"Choose Columns\" in Meds & Orders section of the refill encounter.              Passed - Patient is age 6-64 years        Passed - Medication is active on med list        Passed - No active pregnancy on record        Passed - Normal serum creatinine on file in past 12 months     Recent Labs   Lab Test 09/20/21  1115   CR 1.02       Ok to refill medication if creatinine is low          Passed - No positive pregnancy test in past 12 months          Signed Prescriptions Disp Refills    pantoprazole (PROTONIX) 40 MG EC tablet 90 tablet 2     Sig: TAKE 1 TABLET (40 MG) BY MOUTH DAILY FOR REFLUX.        PPI Protocol Passed - 1/7/2022 11:18 PM        Passed - Not on Clopidogrel (unless Pantoprazole ordered)        Passed - No diagnosis of " "osteoporosis on record        Passed - Recent (12 mo) or future (30 days) visit within the authorizing provider's specialty     Patient has had an office visit with the authorizing provider or a provider within the authorizing providers department within the previous 12 mos or has a future within next 30 days. See \"Patient Info\" tab in inbasket, or \"Choose Columns\" in Meds & Orders section of the refill encounter.              Passed - Medication is active on med list        Passed - Patient is age 18 or older        Passed - No active pregnacy on record        Passed - No positive pregnancy test in past 12 months             "

## 2022-01-11 NOTE — TELEPHONE ENCOUNTER
BP Readings from Last 3 Encounters:   12/07/21 (!) 142/62   09/21/21 129/83   09/20/21 102/65     Estephania Greco BSN, RN

## 2022-01-18 ENCOUNTER — TELEPHONE (OUTPATIENT)
Dept: PHYSICAL MEDICINE AND REHAB | Facility: CLINIC | Age: 62
End: 2022-01-18
Payer: MEDICARE

## 2022-01-28 RX ORDER — SIMVASTATIN 20 MG
20 TABLET ORAL EVERY EVENING
Qty: 90 TABLET | Refills: 0 | Status: SHIPPED | OUTPATIENT
Start: 2022-01-28 | End: 2022-03-21

## 2022-01-28 RX ORDER — METOPROLOL SUCCINATE 50 MG/1
50 TABLET, EXTENDED RELEASE ORAL DAILY
Qty: 90 TABLET | Refills: 0 | Status: SHIPPED | OUTPATIENT
Start: 2022-01-28 | End: 2022-03-21

## 2022-01-28 RX ORDER — NABUMETONE 500 MG/1
TABLET, FILM COATED ORAL
Qty: 60 TABLET | Refills: 1 | Status: SHIPPED | OUTPATIENT
Start: 2022-01-28 | End: 2022-02-21

## 2022-02-01 DIAGNOSIS — M25.511 RIGHT SHOULDER PAIN, UNSPECIFIED CHRONICITY: ICD-10-CM

## 2022-02-01 DIAGNOSIS — Z11.59 ENCOUNTER FOR SCREENING FOR OTHER VIRAL DISEASES: Primary | ICD-10-CM

## 2022-02-01 DIAGNOSIS — M79.601 RIGHT UPPER LIMB PAIN: ICD-10-CM

## 2022-02-01 NOTE — TELEPHONE ENCOUNTER
Routing refill request to provider for review/approval because:  Labs not current:    Lab Results   Component Value Date    WBC 4.4 09/20/2021    WBC 3.9 03/11/2021     Lab Results   Component Value Date    RBC 3.73 09/20/2021    RBC 3.59 03/11/2021     Lab Results   Component Value Date    HGB 12.3 09/20/2021    HGB 12.2 03/11/2021     Lab Results   Component Value Date    HCT 36.9 09/20/2021    HCT 35.9 03/11/2021     Lab Results   Component Value Date    MCV 99 09/20/2021     03/11/2021     Lab Results   Component Value Date    MCH 33.0 09/20/2021    MCH 34.0 03/11/2021     Lab Results   Component Value Date    MCHC 33.3 09/20/2021    MCHC 34.0 03/11/2021     Lab Results   Component Value Date    RDW 11.9 09/20/2021    RDW 11.7 03/11/2021     Lab Results   Component Value Date     09/20/2021     03/11/2021       BP Readings from Last 6 Encounters:   12/07/21 (!) 142/62   09/21/21 129/83   09/20/21 102/65   06/22/21 97/53   03/30/21 107/70   03/11/21 104/58     Chelle Fajardo RN

## 2022-02-10 ENCOUNTER — LAB (OUTPATIENT)
Dept: URGENT CARE | Facility: URGENT CARE | Age: 62
End: 2022-02-10
Payer: MEDICARE

## 2022-02-10 DIAGNOSIS — Z11.59 ENCOUNTER FOR SCREENING FOR OTHER VIRAL DISEASES: ICD-10-CM

## 2022-02-10 PROCEDURE — U0003 INFECTIOUS AGENT DETECTION BY NUCLEIC ACID (DNA OR RNA); SEVERE ACUTE RESPIRATORY SYNDROME CORONAVIRUS 2 (SARS-COV-2) (CORONAVIRUS DISEASE [COVID-19]), AMPLIFIED PROBE TECHNIQUE, MAKING USE OF HIGH THROUGHPUT TECHNOLOGIES AS DESCRIBED BY CMS-2020-01-R: HCPCS

## 2022-02-10 PROCEDURE — U0005 INFEC AGEN DETEC AMPLI PROBE: HCPCS

## 2022-02-11 LAB — SARS-COV-2 RNA RESP QL NAA+PROBE: NEGATIVE

## 2022-02-14 ENCOUNTER — HOSPITAL ENCOUNTER (OUTPATIENT)
Facility: AMBULATORY SURGERY CENTER | Age: 62
Discharge: HOME | End: 2022-02-14
Attending: PHYSICAL MEDICINE & REHABILITATION | Admitting: PHYSICAL MEDICINE & REHABILITATION
Payer: MEDICARE

## 2022-02-14 ENCOUNTER — ANCILLARY PROCEDURE (OUTPATIENT)
Dept: RADIOLOGY | Facility: AMBULATORY SURGERY CENTER | Age: 62
End: 2022-02-14
Attending: PHYSICAL MEDICINE & REHABILITATION
Payer: MEDICARE

## 2022-02-14 VITALS
SYSTOLIC BLOOD PRESSURE: 126 MMHG | OXYGEN SATURATION: 94 % | RESPIRATION RATE: 16 BRPM | BODY MASS INDEX: 26.43 KG/M2 | TEMPERATURE: 97 F | WEIGHT: 139.99 LBS | HEIGHT: 61 IN | HEART RATE: 70 BPM | DIASTOLIC BLOOD PRESSURE: 78 MMHG

## 2022-02-14 DIAGNOSIS — M47.812 CERVICAL SPONDYLOSIS WITHOUT MYELOPATHY: ICD-10-CM

## 2022-02-14 DIAGNOSIS — R52 PAIN: ICD-10-CM

## 2022-02-14 PROCEDURE — 64491 INJ PARAVERT F JNT C/T 2 LEV: CPT | Mod: LT

## 2022-02-14 PROCEDURE — 64490 INJ PARAVERT F JNT C/T 1 LEV: CPT | Mod: LT

## 2022-02-14 PROCEDURE — 64492 INJ PARAVERT F JNT C/T 3 LEV: CPT | Mod: LT

## 2022-02-14 PROCEDURE — 64450 NJX AA&/STRD OTHER PN/BRANCH: CPT | Mod: LT

## 2022-02-14 RX ORDER — BUPIVACAINE HYDROCHLORIDE 5 MG/ML
INJECTION, SOLUTION PERINEURAL PRN
Status: DISCONTINUED | OUTPATIENT
Start: 2022-02-14 | End: 2022-02-14 | Stop reason: HOSPADM

## 2022-02-14 RX ORDER — IOPAMIDOL 408 MG/ML
INJECTION, SOLUTION INTRATHECAL PRN
Status: DISCONTINUED | OUTPATIENT
Start: 2022-02-14 | End: 2022-02-14 | Stop reason: HOSPADM

## 2022-02-14 RX ORDER — LIDOCAINE HYDROCHLORIDE 10 MG/ML
INJECTION, SOLUTION EPIDURAL; INFILTRATION; INTRACAUDAL; PERINEURAL PRN
Status: DISCONTINUED | OUTPATIENT
Start: 2022-02-14 | End: 2022-02-14 | Stop reason: HOSPADM

## 2022-02-14 ASSESSMENT — MIFFLIN-ST. JEOR
SCORE: 1142.12
SCORE: 1142.42

## 2022-02-14 NOTE — PROCEDURES
Procedure(s): INJ PARAVERT F JNT C/T 1 LEV; INJ PARAVERT F JNT C/T 2 LEV; INJ PARAVERT F JNT C/T 3 LEV; SC INJECTION AA&/STRD OTHER PERIPHERAL NERVE/BRANCH    Pre-Procedure Diagnose(s): Cervical spondylolysis; Cervico-occipital neuralgia of left side    Post-Procedure Diagnose(s): Cervical spondylolysis; Cervico-occipital neuralgia of left side    Formatting of this note might be different from the original.  PROCEDURE NOTE     CHIEF COMPLAINT FOR THIS VISIT: Left side neck pain and headache.     Anita Bennett returns to [facility] for undergoing cervical facet joint block injections at 3 levels on the left side. She was last seen by me in the clinic. At that time she had been complaining of ongoing neck pain, worse on the left side. She was noted to be tender in 3 different levels, at C2-3,3-4 and 4-5, and it is felt treating them wiith medial branch blocks at C2,3,4, and 5 as well as third occipital nerve, would be beneficial. She is here for this purpose.     The benefits and risks of the procedure were reviewed with the patient. She was taken to the fluoroscopic suite.   With her informed consent and universal time-out protocol, she was placed in a prone position.     Using fluoroscopy C2-3, C3-4, and C4-5 facet joints were identified and marked.   Using Betasept, skin prep was obtained. Area was draped. Using 1% lidocaine,   topical anesthesia was obtained. Using a 25-gauge 3-1/2 inch needle the above   mentioned joints were accessed at the joint line of C2-3 for   Third Occipital Nerve block, and at the waist of the C2, 3,4,and 5 levels. Isovue 200 was used for contrast. After appropriate spread of contrast,  Using 5 mL of 0.25% Marcaine, approximately 0.5 mL was injected at C2, 3,4,and 5 and 1ml for the TON blockAdditional 0.5 ml was injected as the needle was withdrawn. She tolerated the procedure well. There was no change in strength or sensation. Her pain improved from 6 to 0 post injection  She  will keep an eye out for swelling, use ice as needed, and keep a diary and update my clinic in a weeks' time.     Thank you very much for allowing me to assist with her care. If you have any   further questions, please feel free to contact me.     Cheng Jean MD  Diplomate, American Board of Physical Medicine and Rehabilitation, Pain Medicine

## 2022-02-14 NOTE — DISCHARGE INSTRUCTIONS
Home Care Instructions after a Medial Branch Block      In a medial branch block, a local anesthetic (numbing medicine) is injected near the medial branch nerve. This stops the transmission of pain signals from the facet joint. If this reduces your pain and improves your mobility, it may tell the doctor which facet joint is causing the pain. This procedure is a diagnostic procedure and is typically short lasting. With this injection, a steroid to increase the longevity of the blocks effect may or may not be used.    Activity  -You may resume most normal activity levels with the exception of strenuous activity. It is important for us to know if your pain with normal activity is relieved after this injection.  -DO NOT shower for 24 hours  -DO NOT remove bandaid for 24 hours    Pain  -You may experience soreness at the injection site for one or two days  -You may use an ice pack for 20 minutes every 2 hours for the first 24 hours  -You may use a heating pad after the first 24 hours  -You may use Tylenol (acetaminophen) every 4 hours or other pain medicines as     directed by your physician    You may experience numbness radiating into your legs or arms (depending on the procedure location). This numbness may last several hours. Until sensation returns to normal; please use caution in walking, climbing stairs, and stepping out of your vehicle, etc.      PLEASE KEEP TRACK OF YOUR SYMPTOMS AND NOTE YOUR IMPROVEMENT FOR YOUR DOCTOR.       Fill out the pain diary and fax it back to us. You do not need to call us if the pain diary was faxed.     If you do not have access to a fax machine, attach it to [employer].  You do not need to call us if the pain diary was attached to [employer].     If you cannot fax or send via [employer], then call our call center and request to speak with a nurse for follow up after a procedure   Please contact us if you have:  -Severe pain  -Fever more than 101.5 degrees Fahrenheit  -Signs of infection at  the injection site (redness, swelling, or drainage)    FOR PAIN CENTER PATIENTS:  If you have questions, please contact the Pain Clinic at [phone] Option #1 between the hours of 7:00 am and 3:00 pm Monday through Friday. After office hours you can contact the on call provider by dialing [phone]. If you need immediate attention, we recommend that you go to a hospital emergency room or dial [phone].      FOR PM&R PATIENTS:  For patients seen by the PM and R service, please call [phone]. If you need to fax a pain diary to PM&R the fax number is [phone]. If you are unable to fax, uploading to [employer] is encouraged, then send to provider. If you have procedure scheduling questions please call [phone] Option #2

## 2022-02-15 DIAGNOSIS — M62.838 SPASM OF MUSCLE: ICD-10-CM

## 2022-02-15 DIAGNOSIS — G81.11 RIGHT SPASTIC HEMIPARESIS (H): Primary | ICD-10-CM

## 2022-02-15 RX ORDER — ONABOTULINUMTOXINA 100 [USP'U]/1
600 INJECTION, POWDER, LYOPHILIZED, FOR SOLUTION INTRADERMAL; INTRAMUSCULAR
Qty: 24 EACH | Refills: 0 | Status: SHIPPED | OUTPATIENT
Start: 2022-03-01 | End: 2022-11-27

## 2022-02-21 RX ORDER — NABUMETONE 500 MG/1
TABLET, FILM COATED ORAL
Qty: 60 TABLET | Refills: 1 | Status: SHIPPED | OUTPATIENT
Start: 2022-02-21 | End: 2022-02-22

## 2022-02-22 RX ORDER — NABUMETONE 500 MG/1
TABLET, FILM COATED ORAL
Qty: 60 TABLET | Refills: 1 | Status: SHIPPED | OUTPATIENT
Start: 2022-02-22 | End: 2022-03-21

## 2022-02-24 ENCOUNTER — LAB (OUTPATIENT)
Dept: URGENT CARE | Facility: URGENT CARE | Age: 62
End: 2022-02-24
Attending: PHYSICAL MEDICINE & REHABILITATION
Payer: MEDICARE

## 2022-02-24 DIAGNOSIS — Z11.59 ENCOUNTER FOR SCREENING FOR OTHER VIRAL DISEASES: ICD-10-CM

## 2022-02-24 PROCEDURE — U0003 INFECTIOUS AGENT DETECTION BY NUCLEIC ACID (DNA OR RNA); SEVERE ACUTE RESPIRATORY SYNDROME CORONAVIRUS 2 (SARS-COV-2) (CORONAVIRUS DISEASE [COVID-19]), AMPLIFIED PROBE TECHNIQUE, MAKING USE OF HIGH THROUGHPUT TECHNOLOGIES AS DESCRIBED BY CMS-2020-01-R: HCPCS

## 2022-02-24 PROCEDURE — U0005 INFEC AGEN DETEC AMPLI PROBE: HCPCS

## 2022-02-25 LAB — SARS-COV-2 RNA RESP QL NAA+PROBE: NEGATIVE

## 2022-02-28 ENCOUNTER — HOSPITAL ENCOUNTER (OUTPATIENT)
Facility: AMBULATORY SURGERY CENTER | Age: 62
Discharge: HOME | End: 2022-02-28
Attending: PHYSICAL MEDICINE & REHABILITATION | Admitting: PHYSICAL MEDICINE & REHABILITATION
Payer: MEDICARE

## 2022-02-28 ENCOUNTER — ANCILLARY PROCEDURE (OUTPATIENT)
Dept: RADIOLOGY | Facility: AMBULATORY SURGERY CENTER | Age: 62
End: 2022-02-28
Attending: PHYSICAL MEDICINE & REHABILITATION
Payer: MEDICARE

## 2022-02-28 VITALS
HEART RATE: 67 BPM | SYSTOLIC BLOOD PRESSURE: 147 MMHG | RESPIRATION RATE: 16 BRPM | DIASTOLIC BLOOD PRESSURE: 87 MMHG | OXYGEN SATURATION: 96 %

## 2022-02-28 DIAGNOSIS — R52 PAIN: ICD-10-CM

## 2022-02-28 PROCEDURE — 64450 NJX AA&/STRD OTHER PN/BRANCH: CPT | Mod: LT

## 2022-02-28 PROCEDURE — 64493 INJ PARAVERT F JNT L/S 1 LEV: CPT | Mod: LT

## 2022-02-28 PROCEDURE — 64494 INJ PARAVERT F JNT L/S 2 LEV: CPT | Mod: LT

## 2022-02-28 RX ORDER — IOPAMIDOL 408 MG/ML
INJECTION, SOLUTION INTRATHECAL PRN
Status: DISCONTINUED | OUTPATIENT
Start: 2022-02-28 | End: 2022-02-28 | Stop reason: HOSPADM

## 2022-02-28 RX ORDER — BUPIVACAINE HYDROCHLORIDE 5 MG/ML
INJECTION, SOLUTION PERINEURAL PRN
Status: DISCONTINUED | OUTPATIENT
Start: 2022-02-28 | End: 2022-02-28 | Stop reason: HOSPADM

## 2022-02-28 RX ORDER — LIDOCAINE HYDROCHLORIDE 10 MG/ML
INJECTION, SOLUTION EPIDURAL; INFILTRATION; INTRACAUDAL; PERINEURAL PRN
Status: DISCONTINUED | OUTPATIENT
Start: 2022-02-28 | End: 2022-02-28 | Stop reason: HOSPADM

## 2022-02-28 NOTE — DISCHARGE INSTRUCTIONS
Home Care Instructions after a Medial Branch Block      In a medial branch block, a local anesthetic (numbing medicine) is injected near the medial branch nerve. This stops the transmission of pain signals from the facet joint. If this reduces your pain and improves your mobility, it may tell the doctor which facet joint is causing the pain. This procedure is a diagnostic procedure and is typically short lasting. With this injection, a steroid to increase the longevity of the blocks effect may or may not be used.    Activity  -You may resume most normal activity levels with the exception of strenuous activity. It is important for us to know if your pain with normal activity is relieved after this injection.  -DO NOT shower for 24 hours  -DO NOT remove bandaid for 24 hours    Pain  -You may experience soreness at the injection site for one or two days  -You may use an ice pack for 20 minutes every 2 hours for the first 24 hours  -You may use a heating pad after the first 24 hours  -You may use Tylenol (acetaminophen) every 4 hours or other pain medicines as     directed by your physician    You may experience numbness radiating into your legs or arms (depending on the procedure location). This numbness may last several hours. Until sensation returns to normal; please use caution in walking, climbing stairs, and stepping out of your vehicle, etc.        DID YOU RECEIVE STEROIDS TODAY?  No    Common side effects of steroids:  Not everyone will experience corticosteroid side effects. If side effects are experienced, they will gradually subside in the 7-10 day period following an injection. Most common side effects include:  -Flushed face and/or chest  -Feeling of warmth, particularly in the face but could be an overall feeling of warmth  -Increased blood sugar in diabetic patients  -Menstrual irregularities my occur. If taking hormone-based birth control an alternate method of birth control is recommended  -Sleep  disturbances and/or mood swings are possible  -Leg cramps      PLEASE KEEP TRACK OF YOUR SYMPTOMS AND NOTE YOUR IMPROVEMENT FOR YOUR DOCTOR.       Fill out the pain diary and fax it back to us. You do not need to call us if the pain diary was faxed.     If you do not have access to a fax machine, attach it to [employer].  You do not need to call us if the pain diary was attached to [employer].     If you cannot fax or send via [employer], then call our call center and request to speak with a nurse for follow up after a procedure   Please contact us if you have:  -Severe pain  -Fever more than 101.5 degrees Fahrenheit  -Signs of infection at the injection site (redness, swelling, or drainage)    FOR PAIN CENTER PATIENTS:  If you have questions, please contact the Pain Clinic at [phone] Option #1 between the hours of 7:00 am and 3:00 pm Monday through Friday. After office hours you can contact the on call provider by dialing [phone]. If you need immediate attention, we recommend that you go to a hospital emergency room or dial [phone].      FOR PM&R PATIENTS:  For patients seen by the PM and R service, please call [phone]. If you need to fax a pain diary to PM&R the fax number is [phone]. If you are unable to fax, uploading to [employer] is encouraged, then send to provider. If you have procedure scheduling questions please call [phone] Option #2

## 2022-02-28 NOTE — PROCEDURES
Procedure(s): INJ PARAVERT F JNT L/S 1 LEV; INJ PARAVERT F JNT L/S 2 LEV; INJ PARAVERT F JNT L/S 3 LEV; SC INJECTION AA&/STRD OTHER PERIPHERAL NERVE/BRANCH    Pre-Procedure Diagnose(s): Lumbosacral spondylosis without myelopathy; Neuritis; Hip pain, left    Post-Procedure Diagnose(s): Lumbosacral spondylosis without myelopathy; Neuritis; Hip pain, left    Formatting of this note might be different from the original.  DATE OF SERVICE: 2/28/2022     ATTENDING: VINCE MALLORY    SURGEON: VINCE MALLORY    CHIEF COMPLAINT   Low back pain and hip pain.    Anita Bennett returns to  [facility] for undergoing  medial branch blocks for her ongoing issues related to low back pain. She was  last seen by me in the clinic recently. At that time she was noted to be  tender over the L3, L4, L5 facets left side. It was   therefore felt treating the affected areas with Medial branch blocks from   L3 to Sacral ala, and Lateral branch block of S1 would be beneficial.  She is here for this purpose.     The benefits and risks of the procedure were reviewed with the patient.    With her informed consent, the patient was taken to the fluoroscopic suite  and placed prone on the table. Universal protocol was followed. TIME OUT   conducted just prior to starting procedure confirmed patient identity, site/side,   procedure, patient position, and availability of correct equipment and implants. Yes    The back was prepped with Betasept and draped in a sterile fashion. Using  fluoroscopy, the junction of the superior articular process and the transverse  process at the left L3, L4, L5, and the sacral ala were identified and marked.The lateral aspect of left S1 foramina was also marked. Using 1% lidocaine, topical anesthesia was obtained. Using 25-gauge,3.5 inch  long needles, the above mentioned points were accessed. Isovue 200 was used as contrast. Spread was appropriate. Using 5 cc of 0.5% bupivacaine, approximately  0.5  cc were injected into each of the above areas. An additional 0.5 cc was injected as the needle was removed. She tolerated the  procedure well, experienced relief immediately post injection. I have  advised her to ice the area, keep a diary regarding  her relief and duration, and update my clinic.     If she gets good relief as she has so far, my next recommendation would be to confirm them before radiofrequency ablations of the above mentioned areas.     Thank you very much for allowing me to assist with her care.     If you have any further questions, please feel free to contact me.      Cheng Jean MD

## 2022-03-01 ENCOUNTER — OFFICE VISIT (OUTPATIENT)
Dept: PHYSICAL MEDICINE AND REHAB | Facility: CLINIC | Age: 62
End: 2022-03-01
Payer: MEDICARE

## 2022-03-01 VITALS
RESPIRATION RATE: 16 BRPM | OXYGEN SATURATION: 97 % | DIASTOLIC BLOOD PRESSURE: 72 MMHG | HEART RATE: 68 BPM | SYSTOLIC BLOOD PRESSURE: 117 MMHG

## 2022-03-01 DIAGNOSIS — R26.9 ABNORMAL GAIT: ICD-10-CM

## 2022-03-01 DIAGNOSIS — F32.89 ATYPICAL DEPRESSIVE DISORDER: Primary | ICD-10-CM

## 2022-03-01 DIAGNOSIS — F33.0 MILD EPISODE OF RECURRENT MAJOR DEPRESSIVE DISORDER (H): ICD-10-CM

## 2022-03-01 DIAGNOSIS — G24.8 FOCAL DYSTONIA: ICD-10-CM

## 2022-03-01 DIAGNOSIS — F32.A DEPRESSION: ICD-10-CM

## 2022-03-01 DIAGNOSIS — G81.11 RIGHT SPASTIC HEMIPARESIS (H): Primary | ICD-10-CM

## 2022-03-01 DIAGNOSIS — G81.11 RIGHT SPASTIC HEMIPARESIS (H): ICD-10-CM

## 2022-03-01 DIAGNOSIS — G89.29 OTHER CHRONIC PAIN: ICD-10-CM

## 2022-03-01 PROCEDURE — 99214 OFFICE O/P EST MOD 30 MIN: CPT | Mod: 25 | Performed by: PHYSICAL MEDICINE & REHABILITATION

## 2022-03-01 PROCEDURE — 64644 CHEMODENERV 1 EXTREM 5/> MUS: CPT | Mod: 59 | Performed by: PHYSICAL MEDICINE & REHABILITATION

## 2022-03-01 PROCEDURE — 64646 CHEMODENERV TRUNK MUSC 1-5: CPT | Mod: 59 | Performed by: PHYSICAL MEDICINE & REHABILITATION

## 2022-03-01 PROCEDURE — 96372 THER/PROPH/DIAG INJ SC/IM: CPT | Performed by: PHYSICAL MEDICINE & REHABILITATION

## 2022-03-01 PROCEDURE — 95874 GUIDE NERV DESTR NEEDLE EMG: CPT | Performed by: PHYSICAL MEDICINE & REHABILITATION

## 2022-03-01 PROCEDURE — 64643 CHEMODENERV 1 EXTREM 1-4 EA: CPT | Mod: 59 | Performed by: PHYSICAL MEDICINE & REHABILITATION

## 2022-03-01 RX ORDER — GABAPENTIN 400 MG/1
400 CAPSULE ORAL 3 TIMES DAILY
Qty: 270 CAPSULE | Refills: 3 | Status: SHIPPED | OUTPATIENT
Start: 2022-03-01 | End: 2023-03-27

## 2022-03-01 RX ORDER — ESCITALOPRAM OXALATE 10 MG/1
10 TABLET ORAL DAILY
Qty: 90 TABLET | Refills: 3 | Status: CANCELLED | OUTPATIENT
Start: 2022-03-01 | End: 2023-02-24

## 2022-03-01 RX ORDER — ESCITALOPRAM OXALATE 10 MG/1
10 TABLET ORAL DAILY
Qty: 90 TABLET | Refills: 3 | Status: SHIPPED | OUTPATIENT
Start: 2022-03-01 | End: 2023-04-03

## 2022-03-01 ASSESSMENT — PAIN SCALES - GENERAL: PAINLEVEL: NO PAIN (0)

## 2022-03-01 NOTE — LETTER
3/1/2022       RE: Anita Bennett  [address]  [place] [address]     Dear Colleague,    Thank you for referring your patient, Anita Bennett, to the [facility] [place] [facility]. Please see a copy of my visit note below.    The patient returns for a follow-up visit for her ongoing issues related to spastic hemiparesis affecting the right side.     She has been  seen by me previously at [facility].      Last seen here on 12/7/21. Botox done helped.     She has been complaining of pain in the left side of the neck and the lower back.  She has previously responded nicely to injection therapy with medial branch blocks to the lumbar and cervical region.  Both diagnostic and confirmatory blocks were done with good relief.  This was done in July and August 2019.  She has done physical therapy without relief and continues to do home exercise program without relief.  She has undergone diagnostic medial branch blocks for the left cervical and lumbar spine.  Due to aphasia, her pain diary is difficult.  Her spouse tells me that she was happy, moving better and not complaining of her usual pain for the duration of the anesthetic.  This was about 3 hours for the neck and more than 6 hours for the lower back.    We will plan for radiofrequency ablations if she gets good relief as expected.  Pain is >5/10, worse with transfers.     She reports her function is affected and she needs a little more help.  She has history of sadness and is doing quite well currently and  is tolerating her antidepressant.        Past Medical History        Past Medical History:   Diagnosis Date     Acute ischemic left MCA stroke (H) 3/22/15     Essential hypertension, benign           Past Surgical History         Past Surgical History:   Procedure Laterality Date     NO HISTORY OF SURGERY             Current Outpatient  Prescriptions          Current Outpatient Medications   Medication Sig Dispense Refill     aspirin (ASA) 325 MG tablet TAKE 1 TABLET BY MOUTH EVERY DAY 90 tablet 1     azelastine (OPTIVAR) 0.05 % ophthalmic solution Place 1 drop into both eyes 2 times daily as needed (for itchy water eyes) 5 mL 11     baclofen (LIORESAL) 20 MG tablet Take 1 tablet (20 mg) by mouth 3 times daily 270 tablet 3     escitalopram (LEXAPRO) 10 MG tablet Take 1 tablet (10 mg) by mouth daily 30 tablet 11     gabapentin (NEURONTIN) 400 MG capsule Take 1 capsule (400 mg) by mouth 3 times daily for arm mobility. 90 capsule 11     hydrochlorothiazide (HYDRODIURIL) 12.5 MG tablet Take 1 tablet (12.5 mg) by mouth daily as needed for blood pressure. 90 tablet 1     losartan (COZAAR) 50 MG tablet Take 1 tablet (50 mg) by mouth daily as needed for blood pressure. 90 tablet 1     metoprolol succinate ER (TOPROL-XL) 50 MG 24 hr tablet Take 1 tablet (50 mg) by mouth daily for blood pressure. 90 tablet 1     nabumetone (RELAFEN) 500 MG tablet TAKE 1-2 TABLETS (500-1,000 MG) BY MOUTH 2 TIMES DAILY AS NEEDED FOR PAIN IN ARM OR HIP WITH FOOD 60 tablet 1     pantoprazole (PROTONIX) 40 MG EC tablet Take 1 tablet (40 mg) by mouth daily for reflux. 90 tablet 3     SENEXON-S 8.6-50 MG tablet TAKE 1 TABLET BY MOUTH DAILY AS NEEDED FOR CONSTIPATION 30 tablet 4     simvastatin (ZOCOR) 20 MG tablet Take 1 tablet (20 mg) by mouth every evening for cholesterol. 90 tablet 1     triamcinolone (KENALOG) 0.1 % cream Apply sparingly to affected area three times daily as needed 80 g 0     VITAMIN D3 25 MCG (1000 UT) tablet TAKE 1 TABLET BY MOUTH EVERY DAY 30 tablet 10                        /72   Pulse 68   Resp 16   LMP  (LMP Unknown)   SpO2 97%          On examination, the patient is in her manual wheelchair.  She transfers with assist of her .  She has involvement of right levator scapula, right pectoralis major, right biceps brachii, flexor carpi  ulnaris and flexor carpi radialis, flexor digitorum superficialis and flexor digitorum profundus.  She has increased tone in the flexor pollicis longus and  lumbricals. In the lower extremity . EHL. FDL and gastrocnemius are tight.     Impression: Right spastic hemiparesis, torticollis, gait abnormality and spasm of muscles due to cerebrovascular accident.       Based on today's assessment, she would benefit from 600 units of Botox injections.  I will see her in follow-up in about a month's time to see how she is responding and plan future treatments every 12 weeks.     30 minutes for the evaluation and management portion of the visit, greater than 50% was for counseling on hemiparesis, facet pain, and treatment.     Procedure note: With her informed consent, after explaining the benefits and risks of the procedure, and using Betasept for skin prep, EMG for localization, preservative-free normal saline for dilution, 600 units of Botox, 100 units were injected into the right pectoralis major,  25 needs to right flexor pollicis longus, 50 unis for injected into the following muscles lumbricals,  flexor carpi radialis, flexor digitorum Superficialis, flexor digitorum profundus.  75 units each were injected into the biceps brachii,  FDL, EHL and 100 units into the gastrocnemius.. 600 units were utilized in all.  She tolerated it well.  She will use ice, Tylenol as needed.              Cheng Jean MD

## 2022-03-01 NOTE — PROGRESS NOTES
The patient returns for a follow-up visit for her ongoing issues related to spastic hemiparesis affecting the right side.     She has been  seen by me previously at [facility] [place].      Last seen here on 12/7/21. Botox done helped.     She has been complaining of pain in the left side of the neck and the lower back.  She has previously responded nicely to injection therapy with medial branch blocks to the lumbar and cervical region.  Both diagnostic and confirmatory blocks were done with good relief.  This was done in July and August 2019.  She has done physical therapy without relief and continues to do home exercise program without relief.  She has undergone diagnostic medial branch blocks for the left cervical and lumbar spine.  Due to aphasia, her pain diary is difficult.  Her spouse tells me that she was happy, moving better and not complaining of her usual pain for the duration of the anesthetic.  This was about 3 hours for the neck and more than 6 hours for the lower back.    We will plan for radiofrequency ablations if she gets good relief as expected.  Pain is >5/10, worse with transfers.     She reports her function is affected and she needs a little more help.  She has history of sadness and is doing quite well currently and  is tolerating her antidepressant.        Past Medical History        Past Medical History:   Diagnosis Date     Acute ischemic left MCA stroke (H) 3/22/15     Essential hypertension, benign           Past Surgical History         Past Surgical History:   Procedure Laterality Date     NO HISTORY OF SURGERY             Current Outpatient Prescriptions          Current Outpatient Medications   Medication Sig Dispense Refill     aspirin (ASA) 325 MG tablet TAKE 1 TABLET BY MOUTH EVERY DAY 90 tablet 1     azelastine (OPTIVAR) 0.05 % ophthalmic solution Place 1 drop into both eyes 2 times daily as needed (for itchy water eyes) 5 mL 11     baclofen (LIORESAL) 20 MG tablet Take  1 tablet (20 mg) by mouth 3 times daily 270 tablet 3     escitalopram (LEXAPRO) 10 MG tablet Take 1 tablet (10 mg) by mouth daily 30 tablet 11     gabapentin (NEURONTIN) 400 MG capsule Take 1 capsule (400 mg) by mouth 3 times daily for arm mobility. 90 capsule 11     hydrochlorothiazide (HYDRODIURIL) 12.5 MG tablet Take 1 tablet (12.5 mg) by mouth daily as needed for blood pressure. 90 tablet 1     losartan (COZAAR) 50 MG tablet Take 1 tablet (50 mg) by mouth daily as needed for blood pressure. 90 tablet 1     metoprolol succinate ER (TOPROL-XL) 50 MG 24 hr tablet Take 1 tablet (50 mg) by mouth daily for blood pressure. 90 tablet 1     nabumetone (RELAFEN) 500 MG tablet TAKE 1-2 TABLETS (500-1,000 MG) BY MOUTH 2 TIMES DAILY AS NEEDED FOR PAIN IN ARM OR HIP WITH FOOD 60 tablet 1     pantoprazole (PROTONIX) 40 MG EC tablet Take 1 tablet (40 mg) by mouth daily for reflux. 90 tablet 3     SENEXON-S 8.6-50 MG tablet TAKE 1 TABLET BY MOUTH DAILY AS NEEDED FOR CONSTIPATION 30 tablet 4     simvastatin (ZOCOR) 20 MG tablet Take 1 tablet (20 mg) by mouth every evening for cholesterol. 90 tablet 1     triamcinolone (KENALOG) 0.1 % cream Apply sparingly to affected area three times daily as needed 80 g 0     VITAMIN D3 25 MCG (1000 UT) tablet TAKE 1 TABLET BY MOUTH EVERY DAY 30 tablet 10                        /72   Pulse 68   Resp 16   LMP  (LMP Unknown)   SpO2 97%          On examination, the patient is in her manual wheelchair.  She transfers with assist of her .  She has involvement of right levator scapula, right pectoralis major, right biceps brachii, flexor carpi ulnaris and flexor carpi radialis, flexor digitorum superficialis and flexor digitorum profundus.  She has increased tone in the flexor pollicis longus and  lumbricals. In the lower extremity . EHL. FDL and gastrocnemius are tight.     Impression: Right spastic hemiparesis, torticollis, gait abnormality and spasm of muscles due to  cerebrovascular accident.       Based on today's assessment, she would benefit from 600 units of Botox injections.  I will see her in follow-up in about a month's time to see how she is responding and plan future treatments every 12 weeks.     30 minutes for the evaluation and management portion of the visit, greater than 50% was for counseling on hemiparesis, facet pain, and treatment.     Procedure note: With her informed consent, after explaining the benefits and risks of the procedure, and using Betasept for skin prep, EMG for localization, preservative-free normal saline for dilution, 600 units of Botox, 100 units were injected into the right pectoralis major,  25 needs to right flexor pollicis longus, 50 unis for injected into the following muscles lumbricals, 50 units into flexor carpi radialis, 25 units into flexor digitorum Superficialis, flexor digitorum profundus.  75 units each were injected into the biceps brachii,  50 units into FDL, EHL and 100 units into the gastrocnemius.. 600 units were utilized in all.  She tolerated it well.  She will use ice, Tylenol as needed.     Cheng Jean MD

## 2022-03-11 PROBLEM — F33.0 MILD EPISODE OF RECURRENT MAJOR DEPRESSIVE DISORDER (H): Status: ACTIVE | Noted: 2022-03-11

## 2022-03-21 ENCOUNTER — OFFICE VISIT (OUTPATIENT)
Dept: FAMILY MEDICINE | Facility: CLINIC | Age: 62
End: 2022-03-21
Payer: MEDICARE

## 2022-03-21 VITALS
SYSTOLIC BLOOD PRESSURE: 109 MMHG | RESPIRATION RATE: 19 BRPM | DIASTOLIC BLOOD PRESSURE: 76 MMHG | TEMPERATURE: 98.2 F | OXYGEN SATURATION: 99 % | BODY MASS INDEX: 26.05 KG/M2 | WEIGHT: 137.8 LBS | HEART RATE: 80 BPM

## 2022-03-21 DIAGNOSIS — M79.601 RIGHT UPPER LIMB PAIN: ICD-10-CM

## 2022-03-21 DIAGNOSIS — Z23 NEED FOR VACCINATION: ICD-10-CM

## 2022-03-21 DIAGNOSIS — I69.920 APHASIA, LATE EFFECT OF CEREBROVASCULAR DISEASE: ICD-10-CM

## 2022-03-21 DIAGNOSIS — I10 ESSENTIAL HYPERTENSION WITH GOAL BLOOD PRESSURE LESS THAN 140/90: Primary | ICD-10-CM

## 2022-03-21 DIAGNOSIS — Z86.73 HISTORY OF CVA (CEREBROVASCULAR ACCIDENT): ICD-10-CM

## 2022-03-21 DIAGNOSIS — K21.9 GASTROESOPHAGEAL REFLUX DISEASE WITHOUT ESOPHAGITIS: ICD-10-CM

## 2022-03-21 DIAGNOSIS — M25.511 RIGHT SHOULDER PAIN, UNSPECIFIED CHRONICITY: ICD-10-CM

## 2022-03-21 LAB
ALT SERPL W P-5'-P-CCNC: 32 U/L (ref 0–50)
AST SERPL W P-5'-P-CCNC: 18 U/L (ref 0–45)
CHOLEST SERPL-MCNC: 132 MG/DL
CREAT SERPL-MCNC: 0.9 MG/DL (ref 0.52–1.04)
ERYTHROCYTE [DISTWIDTH] IN BLOOD BY AUTOMATED COUNT: 11.7 % (ref 10–15)
FASTING STATUS PATIENT QL REPORTED: YES
GFR SERPL CREATININE-BSD FRML MDRD: 73 ML/MIN/1.73M2
HCT VFR BLD AUTO: 37.3 % (ref 35–47)
HDLC SERPL-MCNC: 37 MG/DL
HGB BLD-MCNC: 12.3 G/DL (ref 11.7–15.7)
LDLC SERPL CALC-MCNC: 53 MG/DL
MCH RBC QN AUTO: 33.2 PG (ref 26.5–33)
MCHC RBC AUTO-ENTMCNC: 33 G/DL (ref 31.5–36.5)
MCV RBC AUTO: 101 FL (ref 78–100)
NONHDLC SERPL-MCNC: 95 MG/DL
PLATELET # BLD AUTO: 185 10E3/UL (ref 150–450)
POTASSIUM BLD-SCNC: 4.2 MMOL/L (ref 3.4–5.3)
RBC # BLD AUTO: 3.71 10E6/UL (ref 3.8–5.2)
TRIGL SERPL-MCNC: 208 MG/DL
WBC # BLD AUTO: 3.8 10E3/UL (ref 4–11)

## 2022-03-21 PROCEDURE — 91305 COVID-19,PF,PFIZER (12+ YRS): CPT | Performed by: FAMILY MEDICINE

## 2022-03-21 PROCEDURE — 84132 ASSAY OF SERUM POTASSIUM: CPT | Performed by: FAMILY MEDICINE

## 2022-03-21 PROCEDURE — 82565 ASSAY OF CREATININE: CPT | Performed by: FAMILY MEDICINE

## 2022-03-21 PROCEDURE — 36415 COLL VENOUS BLD VENIPUNCTURE: CPT | Performed by: FAMILY MEDICINE

## 2022-03-21 PROCEDURE — 84450 TRANSFERASE (AST) (SGOT): CPT | Performed by: FAMILY MEDICINE

## 2022-03-21 PROCEDURE — 80061 LIPID PANEL: CPT | Performed by: FAMILY MEDICINE

## 2022-03-21 PROCEDURE — 99214 OFFICE O/P EST MOD 30 MIN: CPT | Performed by: FAMILY MEDICINE

## 2022-03-21 PROCEDURE — 84460 ALANINE AMINO (ALT) (SGPT): CPT | Performed by: FAMILY MEDICINE

## 2022-03-21 PROCEDURE — 85027 COMPLETE CBC AUTOMATED: CPT | Performed by: FAMILY MEDICINE

## 2022-03-21 PROCEDURE — 0054A COVID-19,PF,PFIZER (12+ YRS): CPT | Performed by: FAMILY MEDICINE

## 2022-03-21 RX ORDER — NABUMETONE 500 MG/1
500 TABLET, FILM COATED ORAL 2 TIMES DAILY WITH MEALS
Qty: 180 TABLET | Refills: 3 | Status: SHIPPED | OUTPATIENT
Start: 2022-03-21 | End: 2022-09-22

## 2022-03-21 RX ORDER — METOPROLOL SUCCINATE 50 MG/1
50 TABLET, EXTENDED RELEASE ORAL DAILY
Qty: 90 TABLET | Refills: 1 | Status: SHIPPED | OUTPATIENT
Start: 2022-03-21 | End: 2022-08-09

## 2022-03-21 RX ORDER — SIMVASTATIN 20 MG
20 TABLET ORAL EVERY EVENING
Qty: 90 TABLET | Refills: 0 | Status: SHIPPED | OUTPATIENT
Start: 2022-03-21 | End: 2022-06-07

## 2022-03-21 RX ORDER — LOSARTAN POTASSIUM 50 MG/1
50 TABLET ORAL DAILY PRN
Qty: 90 TABLET | Refills: 1 | Status: SHIPPED | OUTPATIENT
Start: 2022-03-21 | End: 2022-08-09

## 2022-03-21 RX ORDER — HYDROCHLOROTHIAZIDE 12.5 MG/1
12.5 TABLET ORAL DAILY PRN
Qty: 90 TABLET | Refills: 1 | Status: SHIPPED | OUTPATIENT
Start: 2022-03-21 | End: 2022-08-09

## 2022-03-21 RX ORDER — PANTOPRAZOLE SODIUM 40 MG/1
40 TABLET, DELAYED RELEASE ORAL DAILY
Qty: 90 TABLET | Refills: 3 | Status: SHIPPED | OUTPATIENT
Start: 2022-03-21 | End: 2023-03-06

## 2022-03-21 ASSESSMENT — PATIENT HEALTH QUESTIONNAIRE - PHQ9: SUM OF ALL RESPONSES TO PHQ QUESTIONS 1-9: 1

## 2022-03-21 NOTE — PATIENT INSTRUCTIONS
At [facility], we strive to deliver an exceptional experience to you, every time we see you. If you receive a survey, please complete it as we do value your feedback.  If you have MyChart, you can expect to receive results automatically within 24 hours of their completion.  Your provider will send a note interpreting your results as well.   If you do not have MyChart, you should receive your results in about a week by mail.    Your care team:                            Family Medicine Internal Medicine   MD Sánchez Miller MD Shantel Branch-Fleming, MD Srinivasa Vaka, MD Katya Belousova, ELKIN Gimenez CNP, MD ([facility]   Yuri Lara, MD Stacey Paz MD Amelia Massimini APRN CNP Kim Thein, APRN CNP Bethany Templen, MD             Clinic hours: Monday - Thursday 7 am-6 pm; Fridays 7 am-5 pm.   Urgent care: Monday - Friday 10 am- 8 pm; Saturday and Sunday 9 am-5 pm.    Clinic: [phone]       [facility]: Monday - Thursday 8 am - 7 pm; Friday 8 am - 6 pm  [facility]: [phone]

## 2022-03-21 NOTE — PROGRESS NOTES
Assessment & Plan     (I10) Essential hypertension with goal blood pressure less than 140/90  (primary encounter diagnosis)  Comment: Well-controlled  Plan: hydrochlorothiazide (HYDRODIURIL) 12.5 MG         tablet, losartan (COZAAR) 50 MG tablet,         metoprolol succinate ER (TOPROL-XL) 50 MG 24 hr        tablet, CBC with platelets, Potassium,         Creatinine         Return in about 6 months (around 9/20/2022) for Medicare annual wellness exam, blood pressure check, lab tests, recheck medications.      (M25.511) Right shoulder pain, unspecified chronicity  (M79.601) Right upper limb pain  Comment: Refill request.  She states that the medication is helpful  Plan: nabumetone (RELAFEN) 500 MG tablet, CBC with         platelets, AST, Creatinine          (K21.9) Gastroesophageal reflux disease without esophagitis  Comment: Refill request  Plan: pantoprazole (PROTONIX) 40 MG EC tablet          (Z86.73) History of CVA (cerebrovascular accident)  (I69.920) Aphasia, late effect of cerebrovascular disease  Comment: She is on a moderate-intensity statin for presumed vascular disease  Plan: simvastatin (ZOCOR) 20 MG tablet, AST, Lipid         panel reflex to direct LDL Non-fasting, ALT         Return in about 6 months (around 9/20/2022) for Medicare annual wellness exam, blood pressure check, lab tests, recheck medications.            (Z23) Need for vaccination  Comment: Booster  Plan: COVID-19,PF,PFIZER (12+ Yrs GRAY LABEL)            Mulugeta Sharma MD  [facility]    Nik Howard is a 61 year old who presents for the following health issues  accompanied by her spouse. And  (our telephone service)    HPI     Hypertension Follow-up      Do you check your blood pressure regularly outside of the clinic? Yes     Are you following a low salt diet? No    Are your blood pressures ever more than 140 on the top number (systolic) OR more   than 90 on the bottom number (diastolic),  for example 140/90? No    Depression Followup    How are you doing with your depression since your last visit? Improved but reports moments of sadness    Are you having other symptoms that might be associated with depression? No    Have you had a significant life event?  No     Are you feeling anxious or having panic attacks?   No    Do you have any concerns with your use of alcohol or other drugs? No    Currently receiving escitalopram from other provider.      Social History     Tobacco Use     Smoking status: Never Smoker     Smokeless tobacco: Never Used   Vaping Use     Vaping Use: Never used   Substance Use Topics     Alcohol use: Not Currently     Drug use: No     PHQ 9/20/2021   PHQ-9 Total Score 2   Q9: Thoughts of better off dead/self-harm past 2 weeks Not at all     ABHISHEK-7 SCORE 5/11/2015   Total Score 3         Hyperlipidemia Follow-Up      Are you regularly taking any medication or supplement to lower your cholesterol?   Yes- simvastatin    Are you having muscle aches or other side effects that you think could be caused by your cholesterol lowering medication?  No      Review of Systems         Objective    /76 (BP Location: Left arm, Patient Position: Sitting, Cuff Size: Adult Large)   Pulse 80   Temp 98.2  F (36.8  C) (Tympanic)   Resp 19   Wt 62.5 kg (137 lb 12.8 oz)   LMP  (LMP Unknown)   SpO2 99%   BMI 26.05 kg/m    Body mass index is 26.05 kg/m .  Physical Exam   GENERAL: healthy, alert and no distress  EYES: Eyes grossly normal to inspection, PERRL, EOMI, sclerae white and conjunctivae normal  RESP: lungs clear to auscultation - no crackles or wheezes, no areas of dullness, no tachypnea  CV: Heart regular rate and rhythm without murmur, click or rub. No peripheral edema and peripheral pulses strong  MS: no gross musculoskeletal defects noted, no edema  SKIN: no suspicious lesions or rashes to visible skin  PSYCH: mentation appears normal, affect normal/bright        Ref. Range  9/20/2021 11:15   Sodium Latest Ref Range: 133 - 144 mmol/L 143   Potassium Latest Ref Range: 3.4 - 5.3 mmol/L 4.3   Chloride Latest Ref Range: 94 - 109 mmol/L 108   Carbon Dioxide Latest Ref Range: 20 - 32 mmol/L 30   Urea Nitrogen Latest Ref Range: 7 - 30 mg/dL 12   Creatinine Latest Ref Range: 0.52 - 1.04 mg/dL 1.02   GFR Estimate Latest Ref Range: >60 mL/min/1.73m2 60 (L)   Calcium Latest Ref Range: 8.5 - 10.1 mg/dL 9.0   Anion Gap Latest Ref Range: 3 - 14 mmol/L 5   ALT Latest Ref Range: 0 - 50 U/L 47   AST Latest Ref Range: 0 - 45 U/L 28   Cholesterol Latest Ref Range: <200 mg/dL 142   Patient Fasting > 8hrs?  Yes   HDL Cholesterol Latest Ref Range: >=50 mg/dL 44 (L)   LDL Cholesterol Calculated Latest Ref Range: <=100 mg/dL 73   Non HDL Cholesterol Latest Ref Range: <130 mg/dL 98   Triglycerides Latest Ref Range: <150 mg/dL 123   Glucose Latest Ref Range: 70 - 99 mg/dL 91   WBC Latest Ref Range: 4.0 - 11.0 10e3/uL 4.4   Hemoglobin Latest Ref Range: 11.7 - 15.7 g/dL 12.3   Hematocrit Latest Ref Range: 35.0 - 47.0 % 36.9   Platelet Count Latest Ref Range: 150 - 450 10e3/uL 188   RBC Count Latest Ref Range: 3.80 - 5.20 10e6/uL 3.73 (L)   MCV Latest Ref Range: 78 - 100 fL 99   MCH Latest Ref Range: 26.5 - 33.0 pg 33.0   MCHC Latest Ref Range: 31.5 - 36.5 g/dL 33.3   RDW Latest Ref Range: 10.0 - 15.0 % 11.9

## 2022-03-24 DIAGNOSIS — Z86.73 PERSONAL HISTORY OF TRANSIENT CEREBRAL ISCHEMIA: ICD-10-CM

## 2022-03-28 RX ORDER — ASPIRIN 325 MG
TABLET ORAL
Qty: 90 TABLET | Refills: 1 | Status: SHIPPED | OUTPATIENT
Start: 2022-03-28 | End: 2022-09-22

## 2022-03-28 NOTE — TELEPHONE ENCOUNTER
Prescription approved per [facility] Refill Protocol.    Sumi Bernstein RN  [facility]

## 2022-03-30 ENCOUNTER — TELEPHONE (OUTPATIENT)
Dept: PHYSICAL MEDICINE AND REHAB | Facility: CLINIC | Age: 62
End: 2022-03-30
Payer: MEDICARE

## 2022-03-30 DIAGNOSIS — M62.838 SPASM OF MUSCLE: ICD-10-CM

## 2022-03-30 RX ORDER — BACLOFEN 20 MG/1
20 TABLET ORAL 3 TIMES DAILY
Qty: 270 TABLET | Refills: 3 | Status: SHIPPED | OUTPATIENT
Start: 2022-03-30 | End: 2023-03-27

## 2022-03-30 NOTE — TELEPHONE ENCOUNTER
[facility] Call Center    Phone Message    May a detailed message be left on voicemail: yes     Reason for Call: Medication Refill Request    Has the patient contacted the pharmacy for the refill? Yes   Name of medication being requested: baclofen (LIORESAL) 20 MG tablet  Provider who prescribed the medication: Miranda  Pharmacy:[facility] [id number] [facility] [place] [facility], [place] - [address]  Date medication is needed: ASAP     Action Taken: Message routed to:  [facility] ([facility]): physical medicine and rehab    Travel Screening: Not Applicable

## 2022-05-09 ENCOUNTER — MEDICAL CORRESPONDENCE (OUTPATIENT)
Dept: HEALTH INFORMATION MANAGEMENT | Facility: CLINIC | Age: 62
End: 2022-05-09
Payer: MEDICARE

## 2022-05-24 ENCOUNTER — OFFICE VISIT (OUTPATIENT)
Dept: PHYSICAL MEDICINE AND REHAB | Facility: CLINIC | Age: 62
End: 2022-05-24
Payer: MEDICARE

## 2022-05-24 VITALS
HEART RATE: 67 BPM | DIASTOLIC BLOOD PRESSURE: 75 MMHG | SYSTOLIC BLOOD PRESSURE: 131 MMHG | RESPIRATION RATE: 16 BRPM | OXYGEN SATURATION: 95 %

## 2022-05-24 DIAGNOSIS — M62.838 SPASM OF MUSCLE: ICD-10-CM

## 2022-05-24 DIAGNOSIS — G24.3 SPASMODIC TORTICOLLIS: ICD-10-CM

## 2022-05-24 DIAGNOSIS — R26.9 ABNORMAL GAIT: ICD-10-CM

## 2022-05-24 DIAGNOSIS — G24.8 FOCAL DYSTONIA: ICD-10-CM

## 2022-05-24 DIAGNOSIS — G81.11 RIGHT SPASTIC HEMIPARESIS (H): Primary | ICD-10-CM

## 2022-05-24 PROCEDURE — 96372 THER/PROPH/DIAG INJ SC/IM: CPT | Performed by: PHYSICAL MEDICINE & REHABILITATION

## 2022-05-24 PROCEDURE — 64644 CHEMODENERV 1 EXTREM 5/> MUS: CPT | Mod: 59 | Performed by: PHYSICAL MEDICINE & REHABILITATION

## 2022-05-24 PROCEDURE — 64616 CHEMODENERV MUSC NECK DYSTON: CPT | Mod: 59 | Performed by: PHYSICAL MEDICINE & REHABILITATION

## 2022-05-24 PROCEDURE — 99213 OFFICE O/P EST LOW 20 MIN: CPT | Mod: 25 | Performed by: PHYSICAL MEDICINE & REHABILITATION

## 2022-05-24 PROCEDURE — 95874 GUIDE NERV DESTR NEEDLE EMG: CPT | Performed by: PHYSICAL MEDICINE & REHABILITATION

## 2022-05-24 PROCEDURE — 64646 CHEMODENERV TRUNK MUSC 1-5: CPT | Mod: 59 | Performed by: PHYSICAL MEDICINE & REHABILITATION

## 2022-05-24 PROCEDURE — 64643 CHEMODENERV 1 EXTREM 1-4 EA: CPT | Mod: 59 | Performed by: PHYSICAL MEDICINE & REHABILITATION

## 2022-05-24 ASSESSMENT — PAIN SCALES - GENERAL: PAINLEVEL: NO PAIN (0)

## 2022-05-24 NOTE — PROGRESS NOTES
The patient returns for a follow-up visit for her ongoing issues related to spastic hemiparesis affecting the right side.     She has been  seen by me previously at [facility] stroke [place].      Last seen here on 3/1/22. Botox done helped.     She has been complaining of pain in the left side of the neck and the lower back.  She has previously responded nicely to injection therapy with medial branch blocks to the lumbar and cervical region.  Both diagnostic and confirmatory blocks were done with good relief.  This was done in July and August 2019.  She has done physical therapy without relief and continues to do home exercise program without relief.  She has undergone diagnostic medial branch blocks for the left cervical and lumbar spine.  Due to aphasia, her pain diary is difficult.  Her spouse tells me that she was happy, moving better and not complaining of her usual pain for the duration of the anesthetic.  This was about 3 hours for the neck and more than 6 hours for the lower back.     Our plan was for radiofrequency ablations if she gets good relief as expected.  Pain is better and has not returned.      She reports her function is better and she is more independent now.  She has history of sadness and is doing quite well currently and  is tolerating her antidepressant.        Past Medical History           Past Medical History:   Diagnosis Date     Acute ischemic left MCA stroke (H) 3/22/15     Essential hypertension, benign           Past Surgical History             Past Surgical History:   Procedure Laterality Date     NO HISTORY OF SURGERY             Current Outpatient Prescriptions               Current Outpatient Medications   Medication Sig Dispense Refill     aspirin (ASA) 325 MG tablet TAKE 1 TABLET BY MOUTH EVERY DAY 90 tablet 1     azelastine (OPTIVAR) 0.05 % ophthalmic solution Place 1 drop into both eyes 2 times daily as needed (for itchy water eyes) 5 mL 11     baclofen (LIORESAL) 20 MG  tablet Take 1 tablet (20 mg) by mouth 3 times daily 270 tablet 3     escitalopram (LEXAPRO) 10 MG tablet Take 1 tablet (10 mg) by mouth daily 30 tablet 11     gabapentin (NEURONTIN) 400 MG capsule Take 1 capsule (400 mg) by mouth 3 times daily for arm mobility. 90 capsule 11     hydrochlorothiazide (HYDRODIURIL) 12.5 MG tablet Take 1 tablet (12.5 mg) by mouth daily as needed for blood pressure. 90 tablet 1     losartan (COZAAR) 50 MG tablet Take 1 tablet (50 mg) by mouth daily as needed for blood pressure. 90 tablet 1     metoprolol succinate ER (TOPROL-XL) 50 MG 24 hr tablet Take 1 tablet (50 mg) by mouth daily for blood pressure. 90 tablet 1     nabumetone (RELAFEN) 500 MG tablet TAKE 1-2 TABLETS (500-1,000 MG) BY MOUTH 2 TIMES DAILY AS NEEDED FOR PAIN IN ARM OR HIP WITH FOOD 60 tablet 1     pantoprazole (PROTONIX) 40 MG EC tablet Take 1 tablet (40 mg) by mouth daily for reflux. 90 tablet 3     SENEXON-S 8.6-50 MG tablet TAKE 1 TABLET BY MOUTH DAILY AS NEEDED FOR CONSTIPATION 30 tablet 4     simvastatin (ZOCOR) 20 MG tablet Take 1 tablet (20 mg) by mouth every evening for cholesterol. 90 tablet 1     triamcinolone (KENALOG) 0.1 % cream Apply sparingly to affected area three times daily as needed 80 g 0     VITAMIN D3 25 MCG (1000 UT) tablet TAKE 1 TABLET BY MOUTH EVERY DAY 30 tablet 10                  /75   Pulse 67   Resp 16   LMP  (LMP Unknown)   SpO2 95%          On examination, the patient is in her manual wheelchair.  She transfers with assist of her .  She has involvement of right levator scapula, right pectoralis major, right biceps brachii, flexor carpi ulnaris and flexor carpi radialis, flexor digitorum superficialis and flexor digitorum profundus.  She has increased tone in the flexor pollicis longus and  lumbricals. In the lower extremity . EHL. FDL and gastrocnemius are tight.     Impression: Right spastic hemiparesis, torticollis, gait abnormality and spasm of muscles due to  cerebrovascular accident.       Based on today's assessment, she would benefit from 600 units of Botox injections.  I will see her in follow-up in about a month's time to see how she is responding and plan future treatments every 12 weeks.     20 minutes for the evaluation and management portion of the visit, greater than 50% was for CC.     Procedure note: With her informed consent, after explaining the benefits and risks of the procedure, and using Betasept for skin prep, EMG for localization, preservative-free normal saline for dilution, 600 units of Botox, 100 units were injected into the right pectoralis major,  25 needs to right flexor pollicis longus, 50 unis for injected into the following muscles lumbricals, 50 units into flexor carpi radialis, 25 units into flexor digitorum Superficialis, flexor digitorum profundus.  75 units each were injected into the biceps brachii,  50 units into FDL, EHL and 100 units into the gastrocnemius.. 600 units were utilized in all.  She tolerated it well.  She will use ice, Tylenol as needed.     Cheng Jean MD

## 2022-05-24 NOTE — LETTER
5/24/2022       RE: Anita Bennett  [address]  [place] [address]     Dear Colleague,    Thank you for referring your patient, Anita Bennett, to the [facility] PHYSICAL MEDICINE AND REHABILITATION CLINIC [place] at [facility]. Please see a copy of my visit note below.    The patient returns for a follow-up visit for her ongoing issues related to spastic hemiparesis affecting the right side.     She has been  seen by me previously at [facility].      Last seen here on 3/1/22. Botox done helped.     She has been complaining of pain in the left side of the neck and the lower back.  She has previously responded nicely to injection therapy with medial branch blocks to the lumbar and cervical region.  Both diagnostic and confirmatory blocks were done with good relief.  This was done in July and August 2019.  She has done physical therapy without relief and continues to do home exercise program without relief.  She has undergone diagnostic medial branch blocks for the left cervical and lumbar spine.  Due to aphasia, her pain diary is difficult.  Her spouse tells me that she was happy, moving better and not complaining of her usual pain for the duration of the anesthetic.  This was about 3 hours for the neck and more than 6 hours for the lower back.     Our plan was for radiofrequency ablations if she gets good relief as expected.  Pain is better and has not returned.      She reports her function is better and she is more independent now.  She has history of sadness and is doing quite well currently and  is tolerating her antidepressant.        Past Medical History           Past Medical History:   Diagnosis Date     Acute ischemic left MCA stroke (H) 3/22/15     Essential hypertension, benign           Past Surgical History             Past Surgical History:   Procedure Laterality Date     NO HISTORY OF SURGERY             Current  Outpatient Prescriptions               Current Outpatient Medications   Medication Sig Dispense Refill     aspirin (ASA) 325 MG tablet TAKE 1 TABLET BY MOUTH EVERY DAY 90 tablet 1     azelastine (OPTIVAR) 0.05 % ophthalmic solution Place 1 drop into both eyes 2 times daily as needed (for itchy water eyes) 5 mL 11     baclofen (LIORESAL) 20 MG tablet Take 1 tablet (20 mg) by mouth 3 times daily 270 tablet 3     escitalopram (LEXAPRO) 10 MG tablet Take 1 tablet (10 mg) by mouth daily 30 tablet 11     gabapentin (NEURONTIN) 400 MG capsule Take 1 capsule (400 mg) by mouth 3 times daily for arm mobility. 90 capsule 11     hydrochlorothiazide (HYDRODIURIL) 12.5 MG tablet Take 1 tablet (12.5 mg) by mouth daily as needed for blood pressure. 90 tablet 1     losartan (COZAAR) 50 MG tablet Take 1 tablet (50 mg) by mouth daily as needed for blood pressure. 90 tablet 1     metoprolol succinate ER (TOPROL-XL) 50 MG 24 hr tablet Take 1 tablet (50 mg) by mouth daily for blood pressure. 90 tablet 1     nabumetone (RELAFEN) 500 MG tablet TAKE 1-2 TABLETS (500-1,000 MG) BY MOUTH 2 TIMES DAILY AS NEEDED FOR PAIN IN ARM OR HIP WITH FOOD 60 tablet 1     pantoprazole (PROTONIX) 40 MG EC tablet Take 1 tablet (40 mg) by mouth daily for reflux. 90 tablet 3     SENEXON-S 8.6-50 MG tablet TAKE 1 TABLET BY MOUTH DAILY AS NEEDED FOR CONSTIPATION 30 tablet 4     simvastatin (ZOCOR) 20 MG tablet Take 1 tablet (20 mg) by mouth every evening for cholesterol. 90 tablet 1     triamcinolone (KENALOG) 0.1 % cream Apply sparingly to affected area three times daily as needed 80 g 0     VITAMIN D3 25 MCG (1000 UT) tablet TAKE 1 TABLET BY MOUTH EVERY DAY 30 tablet 10                  /75   Pulse 67   Resp 16   LMP  (LMP Unknown)   SpO2 95%          On examination, the patient is in her manual wheelchair.  She transfers with assist of her .  She has involvement of right levator scapula, right pectoralis major, right biceps brachii, flexor  carpi ulnaris and flexor carpi radialis, flexor digitorum superficialis and flexor digitorum profundus.  She has increased tone in the flexor pollicis longus and  lumbricals. In the lower extremity . EHL. FDL and gastrocnemius are tight.     Impression: Right spastic hemiparesis, torticollis, gait abnormality and spasm of muscles due to cerebrovascular accident.       Based on today's assessment, she would benefit from 600 units of Botox injections.  I will see her in follow-up in about a month's time to see how she is responding and plan future treatments every 12 weeks.     20 minutes for the evaluation and management portion of the visit, greater than 50% was for CC.     Procedure note: With her informed consent, after explaining the benefits and risks of the procedure, and using Betasept for skin prep, EMG for localization, preservative-free normal saline for dilution, 600 units of Botox, 100 units were injected into the right pectoralis major,  25 needs to right flexor pollicis longus, 50 unis for injected into the following muscles lumbricals, 50 units into flexor carpi radialis, 25 units into flexor digitorum Superficialis, flexor digitorum profundus.  75 units each were injected into the biceps brachii,  50 units into FDL, EHL and 100 units into the gastrocnemius.. 600 units were utilized in all.  She tolerated it well.  She will use ice, Tylenol as needed.       Cheng Jean MD

## 2022-06-04 DIAGNOSIS — I69.920 APHASIA, LATE EFFECT OF CEREBROVASCULAR DISEASE: ICD-10-CM

## 2022-06-04 DIAGNOSIS — Z86.73 HISTORY OF CVA (CEREBROVASCULAR ACCIDENT): ICD-10-CM

## 2022-06-07 RX ORDER — SIMVASTATIN 20 MG
20 TABLET ORAL EVERY EVENING
Qty: 90 TABLET | Refills: 1 | Status: SHIPPED | OUTPATIENT
Start: 2022-06-07 | End: 2022-09-22

## 2022-06-07 NOTE — TELEPHONE ENCOUNTER
Prescription approved per [facility] Refill Protocol.    Rosa Segovia RN  [facility]

## 2022-06-19 DIAGNOSIS — I10 ESSENTIAL HYPERTENSION WITH GOAL BLOOD PRESSURE LESS THAN 140/90: ICD-10-CM

## 2022-06-19 DIAGNOSIS — H10.13 ALLERGIC CONJUNCTIVITIS, BILATERAL: ICD-10-CM

## 2022-06-21 ENCOUNTER — TELEPHONE (OUTPATIENT)
Dept: FAMILY MEDICINE | Facility: CLINIC | Age: 62
End: 2022-06-21

## 2022-06-21 DIAGNOSIS — G81.01 RIGHT FLACCID HEMIPLEGIA (H): Primary | ICD-10-CM

## 2022-06-21 RX ORDER — METOPROLOL SUCCINATE 50 MG/1
50 TABLET, EXTENDED RELEASE ORAL DAILY
Qty: 90 TABLET | Refills: 1 | OUTPATIENT
Start: 2022-06-21

## 2022-06-21 NOTE — TELEPHONE ENCOUNTER
Routing refill request to provider for review/approval because:  Drug not on the [facility] refill protocol     Sarah Arthur RN  [facility]

## 2022-06-21 NOTE — TELEPHONE ENCOUNTER
Racquel call from care coordinator through [employer], helping patient trying to get shower chair for pt   [facility] has been waiting for prescription with a diagnosis code for patient's shower chair, had faxed over the paperwork twice with no response    Fax # for [facility]  - [phone]      To provider to advise      Karlene GRAHAMN, RN

## 2022-06-28 RX ORDER — AZELASTINE HYDROCHLORIDE 0.5 MG/ML
1 SOLUTION/ DROPS OPHTHALMIC 2 TIMES DAILY PRN
Qty: 5 ML | Refills: 11 | OUTPATIENT
Start: 2022-06-28

## 2022-07-05 ENCOUNTER — TRANSFERRED RECORDS (OUTPATIENT)
Dept: HEALTH INFORMATION MANAGEMENT | Facility: CLINIC | Age: 62
End: 2022-07-05

## 2022-08-08 DIAGNOSIS — E55.9 VITAMIN D DEFICIENCY: ICD-10-CM

## 2022-08-08 DIAGNOSIS — I10 ESSENTIAL HYPERTENSION WITH GOAL BLOOD PRESSURE LESS THAN 140/90: ICD-10-CM

## 2022-08-08 DIAGNOSIS — Z53.9 DIAGNOSIS NOT YET DEFINED: ICD-10-CM

## 2022-08-09 DIAGNOSIS — H10.13 ALLERGIC CONJUNCTIVITIS, BILATERAL: ICD-10-CM

## 2022-08-09 RX ORDER — LOSARTAN POTASSIUM 50 MG/1
50 TABLET ORAL DAILY PRN
Qty: 90 TABLET | Refills: 0 | Status: SHIPPED | OUTPATIENT
Start: 2022-08-09 | End: 2022-11-10

## 2022-08-09 RX ORDER — DOCUSATE SODIUM 50MG AND SENNOSIDES 8.6MG 8.6; 5 MG/1; MG/1
TABLET, FILM COATED ORAL
Qty: 30 TABLET | Refills: 0 | Status: SHIPPED | OUTPATIENT
Start: 2022-08-09 | End: 2022-11-10

## 2022-08-09 RX ORDER — HYDROCHLOROTHIAZIDE 12.5 MG/1
12.5 TABLET ORAL DAILY PRN
Qty: 90 TABLET | Refills: 0 | Status: SHIPPED | OUTPATIENT
Start: 2022-08-09 | End: 2022-11-10

## 2022-08-09 RX ORDER — METOPROLOL SUCCINATE 50 MG/1
50 TABLET, EXTENDED RELEASE ORAL DAILY
Qty: 90 TABLET | Refills: 0 | Status: SHIPPED | OUTPATIENT
Start: 2022-08-09 | End: 2022-09-22

## 2022-08-09 RX ORDER — AZELASTINE HYDROCHLORIDE 0.5 MG/ML
1 SOLUTION/ DROPS OPHTHALMIC 2 TIMES DAILY PRN
Qty: 5 ML | Refills: 1 | Status: SHIPPED | OUTPATIENT
Start: 2022-08-09

## 2022-08-09 RX ORDER — VITAMIN B COMPLEX
1 TABLET ORAL DAILY
Qty: 90 TABLET | Refills: 0 | Status: SHIPPED | OUTPATIENT
Start: 2022-08-09 | End: 2022-11-04

## 2022-08-09 NOTE — TELEPHONE ENCOUNTER
Prescription approved per [facility] Refill Protocol.    Angie Sawyer, RN, BSN

## 2022-08-18 ENCOUNTER — OFFICE VISIT (OUTPATIENT)
Dept: PHYSICAL MEDICINE AND REHAB | Facility: CLINIC | Age: 62
End: 2022-08-18
Payer: MEDICARE

## 2022-08-18 VITALS
HEART RATE: 67 BPM | SYSTOLIC BLOOD PRESSURE: 135 MMHG | OXYGEN SATURATION: 93 % | TEMPERATURE: 98 F | DIASTOLIC BLOOD PRESSURE: 81 MMHG

## 2022-08-18 DIAGNOSIS — G24.3 SPASMODIC TORTICOLLIS: ICD-10-CM

## 2022-08-18 DIAGNOSIS — G24.3 CERVICAL DYSTONIA: ICD-10-CM

## 2022-08-18 DIAGNOSIS — G24.8 FOCAL DYSTONIA: ICD-10-CM

## 2022-08-18 DIAGNOSIS — R26.9 ABNORMAL GAIT: ICD-10-CM

## 2022-08-18 DIAGNOSIS — G81.11 RIGHT SPASTIC HEMIPARESIS (H): Primary | ICD-10-CM

## 2022-08-18 PROCEDURE — 99213 OFFICE O/P EST LOW 20 MIN: CPT | Mod: 25 | Performed by: PHYSICAL MEDICINE & REHABILITATION

## 2022-08-18 PROCEDURE — 64644 CHEMODENERV 1 EXTREM 5/> MUS: CPT | Mod: 59 | Performed by: PHYSICAL MEDICINE & REHABILITATION

## 2022-08-18 PROCEDURE — 96372 THER/PROPH/DIAG INJ SC/IM: CPT | Performed by: PHYSICAL MEDICINE & REHABILITATION

## 2022-08-18 PROCEDURE — 64646 CHEMODENERV TRUNK MUSC 1-5: CPT | Mod: 59 | Performed by: PHYSICAL MEDICINE & REHABILITATION

## 2022-08-18 PROCEDURE — 95874 GUIDE NERV DESTR NEEDLE EMG: CPT | Performed by: PHYSICAL MEDICINE & REHABILITATION

## 2022-08-18 PROCEDURE — 64643 CHEMODENERV 1 EXTREM 1-4 EA: CPT | Mod: 59 | Performed by: PHYSICAL MEDICINE & REHABILITATION

## 2022-08-18 NOTE — PROGRESS NOTES
The patient returns for a follow-up visit for her ongoing issues related to spastic hemiparesis affecting the right side.     She has been  seen by me previously at [facility] stroke [place].      Last seen here on 5/24/22. Botox done helped.     She has been complaining of pain in the left side of the neck and the lower back.  She has previously responded nicely to injection therapy with medial branch blocks to the lumbar and cervical region.  Both diagnostic and confirmatory blocks were done with good relief.  This was done in July and August 2019.  She has done physical therapy without relief and continues to do home exercise program without relief.  She has undergone diagnostic medial branch blocks for the left cervical and lumbar spine.  Due to aphasia, her pain diary is difficult.  Her spouse tells me that she was happy, moving better and not complaining of her usual pain for the duration of the anesthetic.  This was about 3 hours for the neck and more than 6 hours for the lower back.     Our plan was for radiofrequency ablations if she gets good relief as expected.  Pain is better and has not returned.      She reports her function is better and she is more independent now.  She has history of sadness and is doing quite well currently and  is tolerating her antidepressant.        Past Medical History           Past Medical History:   Diagnosis Date     Acute ischemic left MCA stroke (H) 3/22/15     Essential hypertension, benign           Past Surgical History             Past Surgical History:   Procedure Laterality Date     NO HISTORY OF SURGERY             Current Outpatient Prescriptions               Current Outpatient Medications   Medication Sig Dispense Refill     aspirin (ASA) 325 MG tablet TAKE 1 TABLET BY MOUTH EVERY DAY 90 tablet 1     azelastine (OPTIVAR) 0.05 % ophthalmic solution Place 1 drop into both eyes 2 times daily as needed (for itchy water eyes) 5 mL 11     baclofen (LIORESAL) 20 MG  tablet Take 1 tablet (20 mg) by mouth 3 times daily 270 tablet 3     escitalopram (LEXAPRO) 10 MG tablet Take 1 tablet (10 mg) by mouth daily 30 tablet 11     gabapentin (NEURONTIN) 400 MG capsule Take 1 capsule (400 mg) by mouth 3 times daily for arm mobility. 90 capsule 11     hydrochlorothiazide (HYDRODIURIL) 12.5 MG tablet Take 1 tablet (12.5 mg) by mouth daily as needed for blood pressure. 90 tablet 1     losartan (COZAAR) 50 MG tablet Take 1 tablet (50 mg) by mouth daily as needed for blood pressure. 90 tablet 1     metoprolol succinate ER (TOPROL-XL) 50 MG 24 hr tablet Take 1 tablet (50 mg) by mouth daily for blood pressure. 90 tablet 1     nabumetone (RELAFEN) 500 MG tablet TAKE 1-2 TABLETS (500-1,000 MG) BY MOUTH 2 TIMES DAILY AS NEEDED FOR PAIN IN ARM OR HIP WITH FOOD 60 tablet 1     pantoprazole (PROTONIX) 40 MG EC tablet Take 1 tablet (40 mg) by mouth daily for reflux. 90 tablet 3     SENEXON-S 8.6-50 MG tablet TAKE 1 TABLET BY MOUTH DAILY AS NEEDED FOR CONSTIPATION 30 tablet 4     simvastatin (ZOCOR) 20 MG tablet Take 1 tablet (20 mg) by mouth every evening for cholesterol. 90 tablet 1     triamcinolone (KENALOG) 0.1 % cream Apply sparingly to affected area three times daily as needed 80 g 0     VITAMIN D3 25 MCG (1000 UT) tablet TAKE 1 TABLET BY MOUTH EVERY DAY 30 tablet 10            /81 (BP Location: Left arm, Patient Position: Sitting, Cuff Size: Adult Regular)   Pulse 67   Temp 98  F (36.7  C) (Oral)   LMP  (LMP Unknown)   SpO2 93%          On examination, the patient is in her manual wheelchair.  She transfers with assist of her .  She has involvement of right levator scapula, right pectoralis major, right biceps brachii, flexor carpi ulnaris and flexor carpi radialis, flexor digitorum superficialis and flexor digitorum profundus.  She has increased tone in the flexor pollicis longus and  lumbricals. In the lower extremity . EHL. FDL and gastrocnemius are tight.     Impression:  Right spastic hemiparesis, torticollis, gait abnormality and spasm of muscles due to cerebrovascular accident.       Based on today's assessment, she would benefit from 600 units of Botox injections.  I will see her in follow-up in about a month's time to see how she is responding and plan future treatments every 12 weeks.     20 minutes for the evaluation and management portion of the visit, greater than 50% was for CC.     Procedure note: With her informed consent, after explaining the benefits and risks of the procedure, and using Betasept for skin prep, EMG for localization, preservative-free normal saline for dilution, 600 units of Botox, 100 units were injected into the right pectoralis major,  25 needs to right flexor pollicis longus, 50 unis for injected into the following muscles lumbricals, 50 units into flexor carpi radialis, 25 units into flexor digitorum Superficialis, flexor digitorum profundus.  75 units each were injected into the biceps brachii,  50 units into FDL, EHL and 100 units into the gastrocnemius.. 600 units were utilized in all.  She tolerated it well.  She will use ice, Tylenol as needed.     Cheng Jean MD

## 2022-08-18 NOTE — NURSING NOTE
Chief Complaint   Patient presents with     RECHECK     Botox injection confirmed with patient     Cuca Zarate CMA at 12:29 PM on 8/18/2022.    Exam room: 3.

## 2022-08-18 NOTE — LETTER
8/18/2022       RE: Anita Bennett  [address]  [place] [address]     Dear Colleague,    Thank you for referring your patient, Anita Bennett, to the [facility] [place] at [facility]. Please see a copy of my visit note below.    The patient returns for a follow-up visit for her ongoing issues related to spastic hemiparesis affecting the right side.     She has been  seen by me previously at [facility].      Last seen here on 5/24/22. Botox done helped.     She has been complaining of pain in the left side of the neck and the lower back.  She has previously responded nicely to injection therapy with medial branch blocks to the lumbar and cervical region.  Both diagnostic and confirmatory blocks were done with good relief.  This was done in July and August 2019.  She has done physical therapy without relief and continues to do home exercise program without relief.  She has undergone diagnostic medial branch blocks for the left cervical and lumbar spine.  Due to aphasia, her pain diary is difficult.  Her spouse tells me that she was happy, moving better and not complaining of her usual pain for the duration of the anesthetic.  This was about 3 hours for the neck and more than 6 hours for the lower back.     Our plan was for radiofrequency ablations if she gets good relief as expected.  Pain is better and has not returned.      She reports her function is better and she is more independent now.  She has history of sadness and is doing quite well currently and  is tolerating her antidepressant.        Past Medical History           Past Medical History:   Diagnosis Date     Acute ischemic left MCA stroke (H) 3/22/15     Essential hypertension, benign           Past Surgical History             Past Surgical History:   Procedure Laterality Date     NO HISTORY OF SURGERY             Current  Outpatient Prescriptions               Current Outpatient Medications   Medication Sig Dispense Refill     aspirin (ASA) 325 MG tablet TAKE 1 TABLET BY MOUTH EVERY DAY 90 tablet 1     azelastine (OPTIVAR) 0.05 % ophthalmic solution Place 1 drop into both eyes 2 times daily as needed (for itchy water eyes) 5 mL 11     baclofen (LIORESAL) 20 MG tablet Take 1 tablet (20 mg) by mouth 3 times daily 270 tablet 3     escitalopram (LEXAPRO) 10 MG tablet Take 1 tablet (10 mg) by mouth daily 30 tablet 11     gabapentin (NEURONTIN) 400 MG capsule Take 1 capsule (400 mg) by mouth 3 times daily for arm mobility. 90 capsule 11     hydrochlorothiazide (HYDRODIURIL) 12.5 MG tablet Take 1 tablet (12.5 mg) by mouth daily as needed for blood pressure. 90 tablet 1     losartan (COZAAR) 50 MG tablet Take 1 tablet (50 mg) by mouth daily as needed for blood pressure. 90 tablet 1     metoprolol succinate ER (TOPROL-XL) 50 MG 24 hr tablet Take 1 tablet (50 mg) by mouth daily for blood pressure. 90 tablet 1     nabumetone (RELAFEN) 500 MG tablet TAKE 1-2 TABLETS (500-1,000 MG) BY MOUTH 2 TIMES DAILY AS NEEDED FOR PAIN IN ARM OR HIP WITH FOOD 60 tablet 1     pantoprazole (PROTONIX) 40 MG EC tablet Take 1 tablet (40 mg) by mouth daily for reflux. 90 tablet 3     SENEXON-S 8.6-50 MG tablet TAKE 1 TABLET BY MOUTH DAILY AS NEEDED FOR CONSTIPATION 30 tablet 4     simvastatin (ZOCOR) 20 MG tablet Take 1 tablet (20 mg) by mouth every evening for cholesterol. 90 tablet 1     triamcinolone (KENALOG) 0.1 % cream Apply sparingly to affected area three times daily as needed 80 g 0     VITAMIN D3 25 MCG (1000 UT) tablet TAKE 1 TABLET BY MOUTH EVERY DAY 30 tablet 10            /81 (BP Location: Left arm, Patient Position: Sitting, Cuff Size: Adult Regular)   Pulse 67   Temp 98  F (36.7  C) (Oral)   LMP  (LMP Unknown)   SpO2 93%          On examination, the patient is in her manual wheelchair.  She transfers with assist of her .  She has  involvement of right levator scapula, right pectoralis major, right biceps brachii, flexor carpi ulnaris and flexor carpi radialis, flexor digitorum superficialis and flexor digitorum profundus.  She has increased tone in the flexor pollicis longus and  lumbricals. In the lower extremity . EHL. FDL and gastrocnemius are tight.     Impression: Right spastic hemiparesis, torticollis, gait abnormality and spasm of muscles due to cerebrovascular accident.       Based on today's assessment, she would benefit from 600 units of Botox injections.  I will see her in follow-up in about a month's time to see how she is responding and plan future treatments every 12 weeks.     20 minutes for the evaluation and management portion of the visit, greater than 50% was for CC.     Procedure note: With her informed consent, after explaining the benefits and risks of the procedure, and using Betasept for skin prep, EMG for localization, preservative-free normal saline for dilution, 600 units of Botox, 100 units were injected into the right pectoralis major,  25 needs to right flexor pollicis longus, 50 unis for injected into the following muscles lumbricals, 50 units into flexor carpi radialis, 25 units into flexor digitorum Superficialis, flexor digitorum profundus.  75 units each were injected into the biceps brachii,  50 units into FDL, EHL and 100 units into the gastrocnemius.. 600 units were utilized in all.  She tolerated it well.  She will use ice, Tylenol as needed.       Cheng Jean MD

## 2022-09-22 ENCOUNTER — OFFICE VISIT (OUTPATIENT)
Dept: FAMILY MEDICINE | Facility: CLINIC | Age: 62
End: 2022-09-22
Payer: MEDICARE

## 2022-09-22 VITALS
SYSTOLIC BLOOD PRESSURE: 91 MMHG | DIASTOLIC BLOOD PRESSURE: 55 MMHG | HEART RATE: 72 BPM | BODY MASS INDEX: 27.94 KG/M2 | TEMPERATURE: 97 F | RESPIRATION RATE: 18 BRPM | OXYGEN SATURATION: 94 % | WEIGHT: 148 LBS | HEIGHT: 61 IN

## 2022-09-22 DIAGNOSIS — Z28.21 VACCINATION NOT CARRIED OUT BECAUSE OF PATIENT REFUSAL: ICD-10-CM

## 2022-09-22 DIAGNOSIS — Z86.73 HISTORY OF CVA (CEREBROVASCULAR ACCIDENT): ICD-10-CM

## 2022-09-22 DIAGNOSIS — M25.511 RIGHT SHOULDER PAIN, UNSPECIFIED CHRONICITY: ICD-10-CM

## 2022-09-22 DIAGNOSIS — I10 ESSENTIAL HYPERTENSION WITH GOAL BLOOD PRESSURE LESS THAN 140/90: Primary | ICD-10-CM

## 2022-09-22 DIAGNOSIS — M79.601 RIGHT UPPER LIMB PAIN: ICD-10-CM

## 2022-09-22 DIAGNOSIS — I69.920 APHASIA, LATE EFFECT OF CEREBROVASCULAR DISEASE: ICD-10-CM

## 2022-09-22 DIAGNOSIS — Z23 NEED FOR VACCINATION: ICD-10-CM

## 2022-09-22 LAB
ANION GAP SERPL CALCULATED.3IONS-SCNC: 3 MMOL/L (ref 3–14)
AST SERPL W P-5'-P-CCNC: 18 U/L (ref 0–45)
BUN SERPL-MCNC: 19 MG/DL (ref 7–30)
CALCIUM SERPL-MCNC: 9 MG/DL (ref 8.5–10.1)
CHLORIDE BLD-SCNC: 106 MMOL/L (ref 94–109)
CHOLEST SERPL-MCNC: 131 MG/DL
CO2 SERPL-SCNC: 31 MMOL/L (ref 20–32)
CREAT SERPL-MCNC: 1.01 MG/DL (ref 0.52–1.04)
ERYTHROCYTE [DISTWIDTH] IN BLOOD BY AUTOMATED COUNT: 11.6 % (ref 10–15)
FASTING STATUS PATIENT QL REPORTED: YES
GFR SERPL CREATININE-BSD FRML MDRD: 63 ML/MIN/1.73M2
GLUCOSE BLD-MCNC: 95 MG/DL (ref 70–99)
HCT VFR BLD AUTO: 36.4 % (ref 35–47)
HDLC SERPL-MCNC: 38 MG/DL
HGB BLD-MCNC: 11.9 G/DL (ref 11.7–15.7)
LDLC SERPL CALC-MCNC: 62 MG/DL
MCH RBC QN AUTO: 32.3 PG (ref 26.5–33)
MCHC RBC AUTO-ENTMCNC: 32.7 G/DL (ref 31.5–36.5)
MCV RBC AUTO: 99 FL (ref 78–100)
NONHDLC SERPL-MCNC: 93 MG/DL
PLATELET # BLD AUTO: 182 10E3/UL (ref 150–450)
POTASSIUM BLD-SCNC: 4.2 MMOL/L (ref 3.4–5.3)
RBC # BLD AUTO: 3.68 10E6/UL (ref 3.8–5.2)
SODIUM SERPL-SCNC: 140 MMOL/L (ref 133–144)
TRIGL SERPL-MCNC: 156 MG/DL
WBC # BLD AUTO: 4 10E3/UL (ref 4–11)

## 2022-09-22 PROCEDURE — 90682 RIV4 VACC RECOMBINANT DNA IM: CPT | Performed by: FAMILY MEDICINE

## 2022-09-22 PROCEDURE — 80061 LIPID PANEL: CPT | Performed by: FAMILY MEDICINE

## 2022-09-22 PROCEDURE — 85027 COMPLETE CBC AUTOMATED: CPT | Performed by: FAMILY MEDICINE

## 2022-09-22 PROCEDURE — 80048 BASIC METABOLIC PNL TOTAL CA: CPT | Performed by: FAMILY MEDICINE

## 2022-09-22 PROCEDURE — 36415 COLL VENOUS BLD VENIPUNCTURE: CPT | Performed by: FAMILY MEDICINE

## 2022-09-22 PROCEDURE — G0008 ADMIN INFLUENZA VIRUS VAC: HCPCS | Performed by: FAMILY MEDICINE

## 2022-09-22 PROCEDURE — 99214 OFFICE O/P EST MOD 30 MIN: CPT | Mod: 25 | Performed by: FAMILY MEDICINE

## 2022-09-22 PROCEDURE — 84450 TRANSFERASE (AST) (SGOT): CPT | Performed by: FAMILY MEDICINE

## 2022-09-22 RX ORDER — SIMVASTATIN 20 MG
20 TABLET ORAL EVERY EVENING
Qty: 90 TABLET | Refills: 1 | Status: SHIPPED | OUTPATIENT
Start: 2022-09-22 | End: 2023-03-06

## 2022-09-22 RX ORDER — ASPIRIN 325 MG
325 TABLET ORAL DAILY
Qty: 90 TABLET | Refills: 3 | Status: SHIPPED | OUTPATIENT
Start: 2022-09-22 | End: 2023-10-27

## 2022-09-22 RX ORDER — METOPROLOL SUCCINATE 50 MG/1
50 TABLET, EXTENDED RELEASE ORAL DAILY
Qty: 90 TABLET | Refills: 3 | Status: SHIPPED | OUTPATIENT
Start: 2022-09-22 | End: 2023-09-12

## 2022-09-22 RX ORDER — NABUMETONE 500 MG/1
500 TABLET, FILM COATED ORAL 2 TIMES DAILY WITH MEALS
Qty: 180 TABLET | Refills: 3 | Status: SHIPPED | OUTPATIENT
Start: 2022-09-22 | End: 2023-06-20

## 2022-09-22 ASSESSMENT — PATIENT HEALTH QUESTIONNAIRE - PHQ9
SUM OF ALL RESPONSES TO PHQ QUESTIONS 1-9: 9
SUM OF ALL RESPONSES TO PHQ QUESTIONS 1-9: 9
10. IF YOU CHECKED OFF ANY PROBLEMS, HOW DIFFICULT HAVE THESE PROBLEMS MADE IT FOR YOU TO DO YOUR WORK, TAKE CARE OF THINGS AT HOME, OR GET ALONG WITH OTHER PEOPLE: SOMEWHAT DIFFICULT

## 2022-09-22 ASSESSMENT — PAIN SCALES - GENERAL: PAINLEVEL: NO PAIN (0)

## 2022-09-22 NOTE — PROGRESS NOTES
Assessment & Plan     (I10) Essential hypertension with goal blood pressure less than 140/90  (primary encounter diagnosis)  Comment: well controlled  Plan: metoprolol succinate ER (TOPROL XL) 50 MG 24 hr        tablet, CBC with platelets, Basic metabolic         panel            (Z86.73) History of CVA (cerebrovascular accident)  Comment:   Plan: aspirin (ASA) 325 MG tablet, simvastatin         (ZOCOR) 20 MG tablet, AST, Lipid panel reflex         to direct LDL Non-fasting            (I69.920) Aphasia, late effect of cerebrovascular disease  Comment:   Plan: aspirin (ASA) 325 MG tablet, simvastatin         (ZOCOR) 20 MG tablet, AST, Lipid panel reflex         to direct LDL Non-fasting            (M25.511) Right shoulder pain, unspecified chronicity  Comment:   Plan: nabumetone (RELAFEN) 500 MG tablet, CBC with         platelets, AST            (M79.601) Right upper limb pain  Comment:   Plan: nabumetone (RELAFEN) 500 MG tablet, CBC with         platelets, AST            (Z23) Need for vaccination  Comment:   Plan: INFLUENZA QUAD, RECOMBINANT, P-FREE (RIV4)         (FLUBLOK) AGE 50-64 [GUC267]            (Z28.21) Vaccination not carried out because of patient refusal  Comment: COVID-19 update. Doesn't want to get any more COVID-19 vaccines, because they make her tired, and she doen't want to talk about that with me  Plan:       Return in about 6 months (around 3/20/2023) for full physical, recheck medications, blood pressure check, lab tests.    Mulugeta Sharma MD  [facility] ZAIDA Howard is a 62 year old accompanied by her spouse and (our telephone service), presenting for the following health issues:  RECHECK (6 mos check up)      History of Present Illness       Reason for visit:  Regular 6 month check up    She eats 0-1 servings of fruits and vegetables daily.She consumes 2 sweetened beverage(s) daily.She exercises with enough effort to increase her heart rate  "9 or less minutes per day.  She exercises with enough effort to increase her heart rate 3 or less days per week.   She is taking medications regularly.    Today's PHQ-9         PHQ-9 Total Score: 9    PHQ-9 Q9 Thoughts of better off dead/self-harm past 2 weeks :   Not at all    How difficult have these problems made it for you to do your work, take care of things at home, or get along with other people: Somewhat difficult         Hypertension Follow-up      Do you check your blood pressure regularly outside of the clinic? Yes     Are you following a low salt diet? Yes    Are your blood pressures ever more than 140 on the top number (systolic) OR more   than 90 on the bottom number (diastolic), for example 140/90?   She takes losartan or HCTZ as needed for BP readings that are too high, about 1-2 times per week.     Review of Systems         Objective    BP 91/55 (BP Location: Left arm, Patient Position: Sitting, Cuff Size: Adult Regular)   Pulse 72   Temp 97  F (36.1  C) (Tympanic)   Resp 18   Ht 1.549 m (5' 0.98\")   Wt 67.1 kg (148 lb)   LMP  (LMP Unknown)   SpO2 94%   BMI 27.98 kg/m    Body mass index is 27.98 kg/m .  Physical Exam   GENERAL: healthy, alert and no distress  EYES: Eyes grossly normal to inspection, PERRL, EOMI, sclerae white and conjunctivae normal  RESP: lungs clear to auscultation - no crackles or wheezes, no areas of dullness, no tachypnea  CV: Heart regular rate and rhythm without murmur, click or rub. No peripheral edema and peripheral pulses strong  MS: no gross musculoskeletal defects noted, no edema  SKIN: no suspicious lesions or rashes to visible skin    PSYCH: mentation appears normal, affect normal/bright                     "

## 2022-09-22 NOTE — LETTER
September 23, 2022      Anita Bennett  [address]  [place] [address]            Dear ,    All of your test results were normal or within the usual/expected range for you.     Please contact the clinic if you have additional questions.  Thank you.     Sincerely,       Mulugeta Sharma MD       Resulted Orders   CBC with platelets   Result Value Ref Range    WBC Count 4.0 4.0 - 11.0 10e3/uL    RBC Count 3.68 (L) 3.80 - 5.20 10e6/uL    Hemoglobin 11.9 11.7 - 15.7 g/dL    Hematocrit 36.4 35.0 - 47.0 %    MCV 99 78 - 100 fL    MCH 32.3 26.5 - 33.0 pg    MCHC 32.7 31.5 - 36.5 g/dL    RDW 11.6 10.0 - 15.0 %    Platelet Count 182 150 - 450 10e3/uL   AST   Result Value Ref Range    AST 18 0 - 45 U/L   Basic metabolic panel   Result Value Ref Range    Sodium 140 133 - 144 mmol/L    Potassium 4.2 3.4 - 5.3 mmol/L    Chloride 106 94 - 109 mmol/L    Carbon Dioxide (CO2) 31 20 - 32 mmol/L    Anion Gap 3 3 - 14 mmol/L    Urea Nitrogen 19 7 - 30 mg/dL    Creatinine 1.01 0.52 - 1.04 mg/dL    Calcium 9.0 8.5 - 10.1 mg/dL    Glucose 95 70 - 99 mg/dL    GFR Estimate 63 >60 mL/min/1.73m2      Comment:      Effective December 21, 2021 eGFRcr in adults is calculated using the 2021 CKD-EPI creatinine equation which includes age and gender (Isabelle henderson al., NEJ, DOI: 10.1056/NIDTlh7911318)   Lipid panel reflex to direct LDL Non-fasting   Result Value Ref Range    Cholesterol 131 <200 mg/dL    Triglycerides 156 (H) <150 mg/dL    Direct Measure HDL 38 (L) >=50 mg/dL    LDL Cholesterol Calculated 62 <=100 mg/dL    Non HDL Cholesterol 93 <130 mg/dL    Patient Fasting > 8hrs? Yes     Narrative    Cholesterol  Desirable:  <200 mg/dL    Triglycerides  Normal:  Less than 150 mg/dL  Borderline High:  150-199 mg/dL  High:  200-499 mg/dL  Very High:  Greater than or equal to 500 mg/dL    Direct Measure HDL  Female:  Greater than or equal to 50 mg/dL   Male:  Greater than or equal to 40 mg/dL    LDL Cholesterol  Desirable:   <100mg/dL  Above Desirable:  100-129 mg/dL   Borderline High:  130-159 mg/dL   High:  160-189 mg/dL   Very High:  >= 190 mg/dL    Non HDL Cholesterol  Desirable:  130 mg/dL  Above Desirable:  130-159 mg/dL  Borderline High:  160-189 mg/dL  High:  190-219 mg/dL  Very High:  Greater than or equal to 220 mg/dL

## 2022-09-26 ENCOUNTER — OFFICE VISIT (OUTPATIENT)
Dept: OPTOMETRY | Facility: CLINIC | Age: 62
End: 2022-09-26
Payer: MEDICARE

## 2022-09-26 DIAGNOSIS — H11.001 PTERYGIUM OF RIGHT EYE: ICD-10-CM

## 2022-09-26 DIAGNOSIS — H52.4 PRESBYOPIA: ICD-10-CM

## 2022-09-26 DIAGNOSIS — H52.223 REGULAR ASTIGMATISM OF BOTH EYES: ICD-10-CM

## 2022-09-26 DIAGNOSIS — H52.03 HYPEROPIA, BILATERAL: ICD-10-CM

## 2022-09-26 DIAGNOSIS — H25.13 NUCLEAR SCLEROSIS OF BOTH EYES: Primary | ICD-10-CM

## 2022-09-26 DIAGNOSIS — H04.123 DRY EYE SYNDROME OF BOTH EYES: ICD-10-CM

## 2022-09-26 DIAGNOSIS — H10.13 ALLERGIC CONJUNCTIVITIS, BILATERAL: ICD-10-CM

## 2022-09-26 PROCEDURE — 92014 COMPRE OPH EXAM EST PT 1/>: CPT | Performed by: OPTOMETRIST

## 2022-09-26 PROCEDURE — 92015 DETERMINE REFRACTIVE STATE: CPT | Mod: GY | Performed by: OPTOMETRIST

## 2022-09-26 RX ORDER — CARBOXYMETHYLCELLULOSE SODIUM 5 MG/ML
1 SOLUTION/ DROPS OPHTHALMIC 4 TIMES DAILY
Qty: 15 ML | Refills: 11 | Status: SHIPPED | OUTPATIENT
Start: 2022-09-26 | End: 2023-09-26

## 2022-09-26 RX ORDER — OLOPATADINE HYDROCHLORIDE 2 MG/ML
1 SOLUTION/ DROPS OPHTHALMIC DAILY
Qty: 2.5 ML | Refills: 11 | Status: SHIPPED | OUTPATIENT
Start: 2022-09-26 | End: 2022-09-27 | Stop reason: ALTCHOICE

## 2022-09-26 RX ORDER — BEPOTASTINE BESILATE 15 MG/ML
1 SOLUTION/ DROPS OPHTHALMIC 2 TIMES DAILY
Qty: 5 ML | Refills: 11 | Status: SHIPPED | OUTPATIENT
Start: 2022-09-26 | End: 2022-09-27 | Stop reason: ALTCHOICE

## 2022-09-26 ASSESSMENT — CUP TO DISC RATIO
OS_RATIO: 0.4
OD_RATIO: 0.45

## 2022-09-26 ASSESSMENT — REFRACTION_WEARINGRX
OS_ADD: +2.50
OD_SPHERE: +1.00
OD_AXIS: 030
OS_AXIS: 180
OD_ADD: +2.50
OD_CYLINDER: +0.50
OS_SPHERE: +0.75
OS_CYLINDER: +0.50
SPECS_TYPE: BIFOCAL

## 2022-09-26 ASSESSMENT — KERATOMETRY
OD_AXISANGLE2_DEGREES: 140
OS_K1POWER_DIOPTERS: 43.00
OS_AXISANGLE_DEGREES: 098
OD_AXISANGLE_DEGREES: 050
OS_K2POWER_DIOPTERS: 43.75
OD_K1POWER_DIOPTERS: 42.50
OD_K2POWER_DIOPTERS: 43.50
METHOD_AUTO_MANUAL: AUTOMATED
OS_AXISANGLE2_DEGREES: 008

## 2022-09-26 ASSESSMENT — REFRACTION_MANIFEST
OS_ADD: +2.50
OD_SPHERE: +2.00
OD_CYLINDER: +0.50
OD_AXIS: 030
OS_SPHERE: +1.25
OD_ADD: +2.50
OS_CYLINDER: +0.50
OS_AXIS: 126

## 2022-09-26 ASSESSMENT — CONF VISUAL FIELD
METHOD: COUNTING FINGERS
OS_NORMAL: 1
OD_NORMAL: 1

## 2022-09-26 ASSESSMENT — VISUAL ACUITY
OS_PH_CC: 20/150
CORRECTION_TYPE: GLASSES
OS_SC: 20/200
OD_CC: 20/100
OS_CC: 20/200
METHOD: SNELLEN - LINEAR
OD_SC: 20/400

## 2022-09-26 ASSESSMENT — TONOMETRY
OD_IOP_MMHG: 9
IOP_METHOD: TONOPEN
OS_IOP_MMHG: 12

## 2022-09-26 ASSESSMENT — EXTERNAL EXAM - LEFT EYE: OS_EXAM: NORMAL

## 2022-09-26 ASSESSMENT — SLIT LAMP EXAM - LIDS
COMMENTS: MEIBOMIAN GLAND DYSFUNCTION, DERMATOCHALASIS
COMMENTS: MEIBOMIAN GLAND DYSFUNCTION, DERMATOCHALASIS

## 2022-09-26 ASSESSMENT — EXTERNAL EXAM - RIGHT EYE: OD_EXAM: NORMAL

## 2022-09-26 NOTE — PATIENT INSTRUCTIONS
You have the start of mild cataracts.  You may notice some blurred vision or glare with night driving.  It is important that you wear good sunglasses to protect your eyes from the ultraviolet light from the sun.  I recommend that you return in 1 year for an eye exam unless there are any sudden changes in your vision.        Pataday to be used once daily for itchy eyes.  Use as needed. Contact your pharmacy for refills.    Refresh tears 1 drop 4x daily.    Ocusoft lid scrubs at night.     Recommend new glasses.  Eyeglass prescription given.    Return in 1 year for a complete eye exam or sooner if needed.    James Jo, OD    The affects of the dilating drops last for 4- 6 hours.  You will be more sensitive to light and vision will be blurry up close.  Do not drive if you do not feel comfortable.  Mydriatic sunglasses were given if needed.    There is a combination of three treatments which can greatly improve symptoms of dry eyes.     Artificial tears  Heat (eyes closed)  Eyelid and eyelash cleansing (eyes closed)     Use one drop of artificial tears both eyes 4 x daily.  Once in the morning, lunch, dinner and bedtime. Continue to use the drops regardless if your eyes are comfortable or not.  Artificial tears work best as a preventative and not as well after your eyes are starting to bother you.  It may take 4- 6 weeks of using the drops before you notice improvement.  If after that time you are still having problems schedule an appointment for an evaluation and discussion of different treatments such as Restasis or Xiidra.  Dry eyes are a chronic condition and you may have more symptoms at certain times of the year.    Excess tearing can be due to the right tears not working properly or a blockage in the tear drainage system.  You can try using artificial tears 1 drop both eyes 4 x day.  If the excess tearing is bothersome after 4-6 weeks of treatment then we can send you for further testing.  This would entail a  referral to our oculoplastic specialist Dr. Jeremy Stuart at the [facility]-[phone].    Recommended brands are:    Systane Complete  Systane Ultra  Systane Balance  Refresh Advanced Optive  Refresh Relieva  Blink    Recommended brands for contact lens wearers are:    Systane contacts  Refresh contacts  Blink contacts    If you are using drops more than 4 x day or have sensitivities to preservatives I recommend non preserved artificial tears.  These come in 1 use vials.  They can be used every 1-2 hours.  Do not reuse the vials.    Recommended brands are:    Refresh Optive Berny-3  Systane- preservative free  Refresh-  preservative free  Blink- preservative free    Gels or ointment can be used at night.    Recommended brands are:    Systane Gel  Refresh Gel  Blink Gel  Genteal Gel    Systane night time (ointment)  Refresh Celluvisc  Refresh PM (ointment)      Visine, Clear Eyes or Murine (drops that get the red out) can irritate the eyes and cause a rebound effect where the eyes become more red and you end up using more drops.  Avoid drops containing tetrahydrozoline, naphazoline, phenylephrine, oxymetazoline.      OTC Lumify is a newer product that gives immediate redness relief without the rebound effect.  Use as needed to take the redness out.    Artificial tears may be used with other drops (such as allergy, glaucoma, antibiotics) around the same time.  Be sure to wait 5 minutes in between drops.    Heat to the eyelids can also improve your symptoms of dry eyes.  Philip heat masks can be purchased at [employer] to be used nightly for 10-15 minutes.  Other options are gel masks that can be put in the microwave and purchased at most pharmacies.      Tea Tree Oil eyelid cleansers recommended are Ocusoft Oust foam cleanser to cleanse eyelids/lashes at night and in the am. Other options are Blephadex or Cliradex eyelid wipes.  KEEP EYES CLOSED when using these products.  These can be purchased on amazon.com    A good product for make up remover with tea tree oil is WeLoveEyes.  This can be found at www.[employer]row[employer]aurus.Hooked or [employer].    Other good eyelid cleansers have hypochlorous which removes excess bacteria and is safe around the eyes. Products are Avenova, Ocusoft Hypochlor or Heyedrate. Spray solution onto cotton pad, close eyes and gently apply to eyelids and eyelashes using side to side motion.  You can also KEEP EYES CLOSED spray and rub into eyelashes.  You do not need to rinse it off. Use morning and evening. These products can be found on Amazon.  You can check with your local pharmacy and see if they can order if for you if they don't have it.    Other brands of eyelid cleansing wipes are:    Ocusoft wipes  Systane wipes    A great eye make up line is [email or web address]

## 2022-09-26 NOTE — LETTER
"    9/26/2022         RE: Anita Bennett  [address]  [place] [address]        Dear Colleague,    Thank you for referring your patient, Anita Bennett, to the [facility]. Please see a copy of my visit note below.    Chief Complaint   Patient presents with     Annual Eye Exam     Cataract      Accompanied by iPad  ID: [id number] and , Phu       Last Eye Exam: 10/28/2020 Dr. Jo  Dilated Previously: Yes, side effects of dilation explained today     What are you currently using to see?  Glasses - wears full time        Distance Vision Acuity: Noticed gradual change in both eyes -  reports she has been pointing in her eyes complaining about things looking \"cloudy\" and more blurry -wondering if cataracts are worsening. Ongoing for a few years but she is noticing it a bit more lately. Symptoms are constant    Near Vision Acuity: Not satisfied     Eye Comfort: dry  Do you use eye drops? : Yes: Optivar BID - do not seem to help. Would like new recommendation   Occupation or Hobbies: Stays home    Karli Keen          Medical, surgical and family histories reviewed and updated 9/26/2022.       OBJECTIVE: See Ophthalmology exam    ASSESSMENT:    ICD-10-CM    1. Nuclear sclerosis of both eyes  H25.13 EYE EXAM (SIMPLE-NONBILLABLE)   2. Pterygium of right eye  H11.001 EYE EXAM (SIMPLE-NONBILLABLE)   3. Allergic conjunctivitis, bilateral  H10.13 EYE EXAM (SIMPLE-NONBILLABLE)     olopatadine (PATADAY) 0.2 % ophthalmic solution   4. Dry eye syndrome of both eyes  H04.123 EYE EXAM (SIMPLE-NONBILLABLE)     carboxymethylcellulose (REFRESH PLUS) 0.5 % SOLN ophthalmic solution   5. Hyperopia, bilateral  H52.03 REFRACTION   6. Regular astigmatism of both eyes  H52.223 REFRACTION   7. Presbyopia  H52.4 REFRACTION       PLAN:     Patient Instructions   You have the start of mild cataracts.  You may notice some blurred vision or glare with night driving.  It is important that you " wear good sunglasses to protect your eyes from the ultraviolet light from the sun.  I recommend that you return in 1 year for an eye exam unless there are any sudden changes in your vision.        Pataday to be used once daily for itchy eyes.  Use as needed. Contact your pharmacy for refills.    Refresh tears 1 drop 4x daily.    Ocusoft lid scrubs at night.     Recommend new glasses.  Eyeglass prescription given.    Return in 1 year for a complete eye exam or sooner if needed.    James Jo, OD                 Again, thank you for allowing me to participate in the care of your patient.        Sincerely,        James Jo, OD

## 2022-09-26 NOTE — PROGRESS NOTES
"Chief Complaint   Patient presents with     Annual Eye Exam     Cataract      Accompanied by iPad  ID: [id number] and , Phu       Last Eye Exam: 10/28/2020 Dr. Jo  Dilated Previously: Yes, side effects of dilation explained today     What are you currently using to see?  Glasses - wears full time        Distance Vision Acuity: Noticed gradual change in both eyes -  reports she has been pointing in her eyes complaining about things looking \"cloudy\" and more blurry -wondering if cataracts are worsening. Ongoing for a few years but she is noticing it a bit more lately. Symptoms are constant    Near Vision Acuity: Not satisfied     Eye Comfort: dry  Do you use eye drops? : Yes: Optivar BID - do not seem to help. Would like new recommendation   Occupation or Hobbies: Stays home    Karli Keen          Medical, surgical and family histories reviewed and updated 9/26/2022.       OBJECTIVE: See Ophthalmology exam    ASSESSMENT:    ICD-10-CM    1. Nuclear sclerosis of both eyes  H25.13 EYE EXAM (SIMPLE-NONBILLABLE)   2. Pterygium of right eye  H11.001 EYE EXAM (SIMPLE-NONBILLABLE)   3. Allergic conjunctivitis, bilateral  H10.13 EYE EXAM (SIMPLE-NONBILLABLE)     olopatadine (PATADAY) 0.2 % ophthalmic solution   4. Dry eye syndrome of both eyes  H04.123 EYE EXAM (SIMPLE-NONBILLABLE)     carboxymethylcellulose (REFRESH PLUS) 0.5 % SOLN ophthalmic solution   5. Hyperopia, bilateral  H52.03 REFRACTION   6. Regular astigmatism of both eyes  H52.223 REFRACTION   7. Presbyopia  H52.4 REFRACTION       PLAN:     Patient Instructions   You have the start of mild cataracts.  You may notice some blurred vision or glare with night driving.  It is important that you wear good sunglasses to protect your eyes from the ultraviolet light from the sun.  I recommend that you return in 1 year for an eye exam unless there are any sudden changes in your vision.        Pataday to be used once daily for itchy eyes.  Use as " needed. Contact your pharmacy for refills.    Refresh tears 1 drop 4x daily.    Ocusoft lid scrubs at night.     Recommend new glasses.  Eyeglass prescription given.    Return in 1 year for a complete eye exam or sooner if needed.    James Jo, OD

## 2022-09-27 RX ORDER — OLOPATADINE HYDROCHLORIDE 1 MG/ML
1 SOLUTION/ DROPS OPHTHALMIC 2 TIMES DAILY
Qty: 5 ML | Refills: 11 | Status: SHIPPED | OUTPATIENT
Start: 2022-09-27 | End: 2024-01-13

## 2022-10-03 ENCOUNTER — APPOINTMENT (OUTPATIENT)
Dept: OPTOMETRY | Facility: CLINIC | Age: 62
End: 2022-10-03
Payer: MEDICARE

## 2022-10-03 PROCEDURE — 92341 FIT SPECTACLES BIFOCAL: CPT | Performed by: OPTOMETRIST

## 2022-11-08 ENCOUNTER — OFFICE VISIT (OUTPATIENT)
Dept: PHYSICAL MEDICINE AND REHAB | Facility: CLINIC | Age: 62
End: 2022-11-08
Payer: MEDICARE

## 2022-11-08 VITALS
HEART RATE: 61 BPM | BODY MASS INDEX: 27.98 KG/M2 | OXYGEN SATURATION: 93 % | DIASTOLIC BLOOD PRESSURE: 80 MMHG | WEIGHT: 148 LBS | SYSTOLIC BLOOD PRESSURE: 128 MMHG

## 2022-11-08 DIAGNOSIS — G81.11 RIGHT SPASTIC HEMIPARESIS (H): Primary | ICD-10-CM

## 2022-11-08 DIAGNOSIS — M62.838 SPASM OF MUSCLE: ICD-10-CM

## 2022-11-08 DIAGNOSIS — G24.8 FOCAL DYSTONIA: ICD-10-CM

## 2022-11-08 DIAGNOSIS — R26.9 ABNORMAL GAIT: ICD-10-CM

## 2022-11-08 PROCEDURE — 64644 CHEMODENERV 1 EXTREM 5/> MUS: CPT | Mod: RT | Performed by: PHYSICAL MEDICINE & REHABILITATION

## 2022-11-08 PROCEDURE — 64643 CHEMODENERV 1 EXTREM 1-4 EA: CPT | Mod: RT | Performed by: PHYSICAL MEDICINE & REHABILITATION

## 2022-11-08 PROCEDURE — 95874 GUIDE NERV DESTR NEEDLE EMG: CPT | Performed by: PHYSICAL MEDICINE & REHABILITATION

## 2022-11-08 NOTE — NURSING NOTE
Chief Complaint   Patient presents with     RECHECK     Botox injections confirmed with patients      Cuca Zarate CMA at 12:53 PM on 11/8/2022.

## 2022-11-08 NOTE — LETTER
11/8/2022       RE: Anita Bennett  [address]  [place] [address]     Dear Colleague,    Thank you for referring your patient, Anita Bennett, to the [facility] PHYSICAL MEDICINE AND REHABILITATION CLINIC [place] at [facility]. Please see a copy of my visit note below.    The patient returns for a follow-up visit for her ongoing issues related to spastic hemiparesis affecting the right side.     She has been  seen by me previously at [facility].      Last seen here on 8/18/22. Botox done helped.     She has been complaining of pain in the left side of the neck and the lower back.  She has previously responded nicely to injection therapy with medial branch blocks to the lumbar and cervical region.  Both diagnostic and confirmatory blocks were done with good relief.  This was done in July and August 2019.  She has done physical therapy without relief and continues to do home exercise program without relief.  She has undergone diagnostic medial branch blocks for the left cervical and lumbar spine.  Due to aphasia, her pain diary is difficult.  Her spouse tells me that she was happy, moving better and not complaining of her usual pain for the duration of the anesthetic.  This was about 3 hours for the neck and more than 6 hours for the lower back.     Our plan was for radiofrequency ablations if she gets good relief as expected.  Pain is better and has not returned.      She reports her function is better and she is more independent now.  She has history of sadness and is doing quite well currently and  is tolerating her antidepressant.        Past Medical History           Past Medical History:   Diagnosis Date     Acute ischemic left MCA stroke (H) 3/22/15     Essential hypertension, benign           Past Surgical History             Past Surgical History:   Procedure Laterality Date     NO HISTORY OF SURGERY             Current  Outpatient Prescriptions               Current Outpatient Medications   Medication Sig Dispense Refill     aspirin (ASA) 325 MG tablet TAKE 1 TABLET BY MOUTH EVERY DAY 90 tablet 1     azelastine (OPTIVAR) 0.05 % ophthalmic solution Place 1 drop into both eyes 2 times daily as needed (for itchy water eyes) 5 mL 11     baclofen (LIORESAL) 20 MG tablet Take 1 tablet (20 mg) by mouth 3 times daily 270 tablet 3     escitalopram (LEXAPRO) 10 MG tablet Take 1 tablet (10 mg) by mouth daily 30 tablet 11     gabapentin (NEURONTIN) 400 MG capsule Take 1 capsule (400 mg) by mouth 3 times daily for arm mobility. 90 capsule 11     hydrochlorothiazide (HYDRODIURIL) 12.5 MG tablet Take 1 tablet (12.5 mg) by mouth daily as needed for blood pressure. 90 tablet 1     losartan (COZAAR) 50 MG tablet Take 1 tablet (50 mg) by mouth daily as needed for blood pressure. 90 tablet 1     metoprolol succinate ER (TOPROL-XL) 50 MG 24 hr tablet Take 1 tablet (50 mg) by mouth daily for blood pressure. 90 tablet 1     nabumetone (RELAFEN) 500 MG tablet TAKE 1-2 TABLETS (500-1,000 MG) BY MOUTH 2 TIMES DAILY AS NEEDED FOR PAIN IN ARM OR HIP WITH FOOD 60 tablet 1     pantoprazole (PROTONIX) 40 MG EC tablet Take 1 tablet (40 mg) by mouth daily for reflux. 90 tablet 3     SENEXON-S 8.6-50 MG tablet TAKE 1 TABLET BY MOUTH DAILY AS NEEDED FOR CONSTIPATION 30 tablet 4     simvastatin (ZOCOR) 20 MG tablet Take 1 tablet (20 mg) by mouth every evening for cholesterol. 90 tablet 1     triamcinolone (KENALOG) 0.1 % cream Apply sparingly to affected area three times daily as needed 80 g 0     VITAMIN D3 25 MCG (1000 UT) tablet TAKE 1 TABLET BY MOUTH EVERY DAY 30 tablet 10           /80 (BP Location: Left arm, Patient Position: Sitting, Cuff Size: Adult Large)   Pulse 61   Wt 67.1 kg (148 lb)   LMP  (LMP Unknown)   SpO2 93%   BMI 27.98 kg/m           On examination, the patient is in her manual wheelchair.  She transfers with assist of her .   She has involvement of right levator scapula, right pectoralis major, right biceps brachii, flexor carpi ulnaris and flexor carpi radialis, flexor digitorum superficialis and flexor digitorum profundus.  She has increased tone in the flexor pollicis longus and  lumbricals. In the lower extremity . EHL. FDL and gastrocnemius are tight.     Impression: Right spastic hemiparesis, torticollis, gait abnormality and spasm of muscles due to cerebrovascular accident.       Based on today's assessment, she would benefit from 600 units of Botox injections.  I will see her in follow-up in about a month's time to see how she is responding and plan future treatments every 12 weeks.     20 minutes for the evaluation and management portion of the visit, greater than 50% was for CC.     Procedure note: With her informed consent, after explaining the benefits and risks of the procedure, and using Betasept for skin prep, EMG for localization, preservative-free normal saline for dilution, 600 units of Botox, 100 units were injected into the right pectoralis major,  25 needs to right flexor pollicis longus, 50 unis for injected into the following muscles lumbricals, 50 units into flexor carpi radialis, 50 units into flexor digitorum Superficialis, flexor digitorum profundus.  75 units each were injected into the biceps brachii,  50 units into FDL, EHL and 100 units into the gastrocnemius.. 600 units were utilized in all.  She tolerated it well.  She will use ice, Tylenol as needed.       Cheng Jean MD

## 2022-11-08 NOTE — PROGRESS NOTES
The patient returns for a follow-up visit for her ongoing issues related to spastic hemiparesis affecting the right side.     She has been  seen by me previously at [facility] stroke [place].      Last seen here on 8/18/22. Botox done helped.     She has been complaining of pain in the left side of the neck and the lower back.  She has previously responded nicely to injection therapy with medial branch blocks to the lumbar and cervical region.  Both diagnostic and confirmatory blocks were done with good relief.  This was done in July and August 2019.  She has done physical therapy without relief and continues to do home exercise program without relief.  She has undergone diagnostic medial branch blocks for the left cervical and lumbar spine.  Due to aphasia, her pain diary is difficult.  Her spouse tells me that she was happy, moving better and not complaining of her usual pain for the duration of the anesthetic.  This was about 3 hours for the neck and more than 6 hours for the lower back.     Our plan was for radiofrequency ablations if she gets good relief as expected.  Pain is better and has not returned.      She reports her function is better and she is more independent now.  She has history of sadness and is doing quite well currently and  is tolerating her antidepressant.        Past Medical History           Past Medical History:   Diagnosis Date     Acute ischemic left MCA stroke (H) 3/22/15     Essential hypertension, benign           Past Surgical History             Past Surgical History:   Procedure Laterality Date     NO HISTORY OF SURGERY             Current Outpatient Prescriptions               Current Outpatient Medications   Medication Sig Dispense Refill     aspirin (ASA) 325 MG tablet TAKE 1 TABLET BY MOUTH EVERY DAY 90 tablet 1     azelastine (OPTIVAR) 0.05 % ophthalmic solution Place 1 drop into both eyes 2 times daily as needed (for itchy water eyes) 5 mL 11     baclofen (LIORESAL) 20 MG  tablet Take 1 tablet (20 mg) by mouth 3 times daily 270 tablet 3     escitalopram (LEXAPRO) 10 MG tablet Take 1 tablet (10 mg) by mouth daily 30 tablet 11     gabapentin (NEURONTIN) 400 MG capsule Take 1 capsule (400 mg) by mouth 3 times daily for arm mobility. 90 capsule 11     hydrochlorothiazide (HYDRODIURIL) 12.5 MG tablet Take 1 tablet (12.5 mg) by mouth daily as needed for blood pressure. 90 tablet 1     losartan (COZAAR) 50 MG tablet Take 1 tablet (50 mg) by mouth daily as needed for blood pressure. 90 tablet 1     metoprolol succinate ER (TOPROL-XL) 50 MG 24 hr tablet Take 1 tablet (50 mg) by mouth daily for blood pressure. 90 tablet 1     nabumetone (RELAFEN) 500 MG tablet TAKE 1-2 TABLETS (500-1,000 MG) BY MOUTH 2 TIMES DAILY AS NEEDED FOR PAIN IN ARM OR HIP WITH FOOD 60 tablet 1     pantoprazole (PROTONIX) 40 MG EC tablet Take 1 tablet (40 mg) by mouth daily for reflux. 90 tablet 3     SENEXON-S 8.6-50 MG tablet TAKE 1 TABLET BY MOUTH DAILY AS NEEDED FOR CONSTIPATION 30 tablet 4     simvastatin (ZOCOR) 20 MG tablet Take 1 tablet (20 mg) by mouth every evening for cholesterol. 90 tablet 1     triamcinolone (KENALOG) 0.1 % cream Apply sparingly to affected area three times daily as needed 80 g 0     VITAMIN D3 25 MCG (1000 UT) tablet TAKE 1 TABLET BY MOUTH EVERY DAY 30 tablet 10           /80 (BP Location: Left arm, Patient Position: Sitting, Cuff Size: Adult Large)   Pulse 61   Wt 67.1 kg (148 lb)   LMP  (LMP Unknown)   SpO2 93%   BMI 27.98 kg/m           On examination, the patient is in her manual wheelchair.  She transfers with assist of her .  She has involvement of right levator scapula, right pectoralis major, right biceps brachii, flexor carpi ulnaris and flexor carpi radialis, flexor digitorum superficialis and flexor digitorum profundus.  She has increased tone in the flexor pollicis longus and  lumbricals. In the lower extremity . EHL. FDL and gastrocnemius are  tight.     Impression: Right spastic hemiparesis, torticollis, gait abnormality and spasm of muscles due to cerebrovascular accident.       Based on today's assessment, she would benefit from 600 units of Botox injections.  I will see her in follow-up in about a month's time to see how she is responding and plan future treatments every 12 weeks.     20 minutes for the evaluation and management portion of the visit, greater than 50% was for CC.     Procedure note: With her informed consent, after explaining the benefits and risks of the procedure, and using Betasept for skin prep, EMG for localization, preservative-free normal saline for dilution, 600 units of Botox, 100 units were injected into the right pectoralis major,  25 needs to right flexor pollicis longus, 50 unis for injected into the following muscles lumbricals, 50 units into flexor carpi radialis, 50 units into flexor digitorum Superficialis, flexor digitorum profundus.  75 units each were injected into the biceps brachii,  50 units into FDL, EHL and 100 units into the gastrocnemius.. 600 units were utilized in all.  She tolerated it well.  She will use ice, Tylenol as needed.     Cheng Jean MD

## 2023-02-23 DIAGNOSIS — I10 ESSENTIAL HYPERTENSION WITH GOAL BLOOD PRESSURE LESS THAN 140/90: ICD-10-CM

## 2023-02-23 RX ORDER — HYDROCHLOROTHIAZIDE 12.5 MG/1
12.5 TABLET ORAL DAILY PRN
Qty: 90 TABLET | Refills: 0 | Status: SHIPPED | OUTPATIENT
Start: 2023-02-23 | End: 2023-03-20

## 2023-03-01 ENCOUNTER — APPOINTMENT (OUTPATIENT)
Dept: INTERPRETER SERVICES | Facility: CLINIC | Age: 63
End: 2023-03-01
Payer: MEDICARE

## 2023-03-07 ENCOUNTER — OFFICE VISIT (OUTPATIENT)
Dept: PHYSICAL MEDICINE AND REHAB | Facility: CLINIC | Age: 63
End: 2023-03-07
Payer: MEDICARE

## 2023-03-07 VITALS — SYSTOLIC BLOOD PRESSURE: 113 MMHG | OXYGEN SATURATION: 94 % | HEART RATE: 60 BPM | DIASTOLIC BLOOD PRESSURE: 67 MMHG

## 2023-03-07 DIAGNOSIS — M47.812 CERVICAL SPONDYLOSIS WITHOUT MYELOPATHY: ICD-10-CM

## 2023-03-07 DIAGNOSIS — R26.9 ABNORMAL GAIT: ICD-10-CM

## 2023-03-07 DIAGNOSIS — G81.11 RIGHT SPASTIC HEMIPARESIS (H): Primary | ICD-10-CM

## 2023-03-07 DIAGNOSIS — G24.8 FOCAL DYSTONIA: ICD-10-CM

## 2023-03-07 DIAGNOSIS — M62.838 SPASM OF MUSCLE: ICD-10-CM

## 2023-03-07 PROCEDURE — 64644 CHEMODENERV 1 EXTREM 5/> MUS: CPT | Performed by: PHYSICAL MEDICINE & REHABILITATION

## 2023-03-07 PROCEDURE — 64643 CHEMODENERV 1 EXTREM 1-4 EA: CPT | Performed by: PHYSICAL MEDICINE & REHABILITATION

## 2023-03-07 PROCEDURE — 95873 GUIDE NERV DESTR ELEC STIM: CPT | Performed by: PHYSICAL MEDICINE & REHABILITATION

## 2023-03-07 NOTE — LETTER
3/7/2023       RE: Anita Bennett  [address]  [place] [address]     Dear Colleague,    Thank you for referring your patient, Anita Bennett, to the [facility] PHYSICAL MEDICINE AND REHABILITATION CLINIC [place] at [facility]. Please see a copy of my visit note below.    The patient returns for a follow-up visit for her ongoing issues related to spastic hemiparesis affecting the right side.     She has been  seen by me previously at [facility].      Last seen here on 11/08/22. Botox done helped.     She has been complaining of pain in the left side of the neck and the lower back.  She has previously responded nicely to injection therapy with medial branch blocks to the lumbar and cervical region.  Both diagnostic and confirmatory blocks were done with good relief.  This was done in July and August 2019.  She has done physical therapy without relief and continues to do home exercise program without relief.  She has undergone diagnostic medial branch blocks for the left cervical and lumbar spine.  Due to aphasia, her pain diary is difficult.  Her spouse tells me that she was happy, moving better and not complaining of her usual pain for the duration of the anesthetic.  This was about 3 hours for the neck and more than 6 hours for the lower back.  Her pain has since returned.  Due to the number of years since the last 1 was done, I will repeat an MRI of the cervical spine looking for any other causes for her pain.  If none is found, I will repeat the medial branch blocks from C2-C5 including the third occipital nerves on the left side and do diagnostic and confirmatory test and if she gets good relief from this, she would be a candidate for radiofrequency ablations.  Due to her significant spasticity and stroke, she is not a candidate for the conventional physical therapy.  This may have to be done if insurance insists.  In the past she  has had good relief with just the medial branch blocks and did not even proceed to radiofrequency ablations.        She reports her function is better and she is more independent now.  She has history of sadness and is doing quite well currently and  is tolerating her antidepressant.        Past Medical History           Past Medical History:   Diagnosis Date     Acute ischemic left MCA stroke (H) 3/22/15     Essential hypertension, benign           Past Surgical History             Past Surgical History:   Procedure Laterality Date     NO HISTORY OF SURGERY             Current Outpatient Prescriptions               Current Outpatient Medications   Medication Sig Dispense Refill     aspirin (ASA) 325 MG tablet TAKE 1 TABLET BY MOUTH EVERY DAY 90 tablet 1     azelastine (OPTIVAR) 0.05 % ophthalmic solution Place 1 drop into both eyes 2 times daily as needed (for itchy water eyes) 5 mL 11     baclofen (LIORESAL) 20 MG tablet Take 1 tablet (20 mg) by mouth 3 times daily 270 tablet 3     escitalopram (LEXAPRO) 10 MG tablet Take 1 tablet (10 mg) by mouth daily 30 tablet 11     gabapentin (NEURONTIN) 400 MG capsule Take 1 capsule (400 mg) by mouth 3 times daily for arm mobility. 90 capsule 11     hydrochlorothiazide (HYDRODIURIL) 12.5 MG tablet Take 1 tablet (12.5 mg) by mouth daily as needed for blood pressure. 90 tablet 1     losartan (COZAAR) 50 MG tablet Take 1 tablet (50 mg) by mouth daily as needed for blood pressure. 90 tablet 1     metoprolol succinate ER (TOPROL-XL) 50 MG 24 hr tablet Take 1 tablet (50 mg) by mouth daily for blood pressure. 90 tablet 1     nabumetone (RELAFEN) 500 MG tablet TAKE 1-2 TABLETS (500-1,000 MG) BY MOUTH 2 TIMES DAILY AS NEEDED FOR PAIN IN ARM OR HIP WITH FOOD 60 tablet 1     pantoprazole (PROTONIX) 40 MG EC tablet Take 1 tablet (40 mg) by mouth daily for reflux. 90 tablet 3     SENEXON-S 8.6-50 MG tablet TAKE 1 TABLET BY MOUTH DAILY AS NEEDED FOR CONSTIPATION 30 tablet 4      simvastatin (ZOCOR) 20 MG tablet Take 1 tablet (20 mg) by mouth every evening for cholesterol. 90 tablet 1     triamcinolone (KENALOG) 0.1 % cream Apply sparingly to affected area three times daily as needed 80 g 0     VITAMIN D3 25 MCG (1000 UT) tablet TAKE 1 TABLET BY MOUTH EVERY DAY 30 tablet 10            /67 (BP Location: Left arm, Patient Position: Sitting, Cuff Size: Adult Regular)   Pulse 60   LMP  (LMP Unknown)   SpO2 94%          On examination, the patient is in her manual wheelchair.  She transfers with assist of her .  She has involvement of right levator scapula, right pectoralis major, right biceps brachii, flexor carpi ulnaris and flexor carpi radialis, flexor digitorum superficialis and flexor digitorum profundus.  She has increased tone in the flexor pollicis longus and  lumbricals. In the lower extremity . EHL. FDL and gastrocnemius are tight.  She is also tender over the left side of the neck where she is complaining of pain currently including the occipital region.  The right side is nontender.  The low back is nontender.     Impression: Right spastic hemiparesis, torticollis, gait abnormality and spasm of muscles due to cerebrovascular accident.  Cervical spondylosis on the left side.     Based on today's assessment, she would benefit from 600 units of Botox injections.  I will see her in follow-up in about a month's time to see how she is responding and plan future treatments every 12 weeks.     20 minutes for the evaluation and management portion of the visit, greater than 50% was for CC.     Procedure note: With her informed consent, after explaining the benefits and risks of the procedure, and using Betasept for skin prep, EMG for localization, preservative-free normal saline for dilution, 600 units of Botox, 100 units were injected into the right pectoralis major,  25 needs to right flexor pollicis longus, 50 unis for injected into the following muscles lumbricals, 50 units  into flexor carpi radialis, 50 units into flexor digitorum Superficialis, flexor digitorum profundus.  75 units each were injected into the biceps brachii,  50 units into FDL, EHL and 100 units into the gastrocnemius.. 600 units were utilized in all.  She tolerated it well.  She will use ice, Tylenol as needed.     Cheng Jean MD

## 2023-03-07 NOTE — PROGRESS NOTES
The patient returns for a follow-up visit for her ongoing issues related to spastic hemiparesis affecting the right side.     She has been  seen by me previously at [facility] stroke [place].      Last seen here on 11/08/22. Botox done helped.     She has been complaining of pain in the left side of the neck and the lower back.  She has previously responded nicely to injection therapy with medial branch blocks to the lumbar and cervical region.  Both diagnostic and confirmatory blocks were done with good relief.  This was done in July and August 2019.  She has done physical therapy without relief and continues to do home exercise program without relief.  She has undergone diagnostic medial branch blocks for the left cervical and lumbar spine.  Due to aphasia, her pain diary is difficult.  Her spouse tells me that she was happy, moving better and not complaining of her usual pain for the duration of the anesthetic.  This was about 3 hours for the neck and more than 6 hours for the lower back.  Her pain has since returned.  Due to the number of years since the last 1 was done, I will repeat an MRI of the cervical spine looking for any other causes for her pain.  If none is found, I will repeat the medial branch blocks from C2-C5 including the third occipital nerves on the left side and do diagnostic and confirmatory test and if she gets good relief from this, she would be a candidate for radiofrequency ablations.  Due to her significant spasticity and stroke, she is not a candidate for the conventional physical therapy.  This may have to be done if insurance insists.  In the past she has had good relief with just the medial branch blocks and did not even proceed to radiofrequency ablations.        She reports her function is better and she is more independent now.  She has history of sadness and is doing quite well currently and  is tolerating her antidepressant.        Past Medical History           Past Medical  History:   Diagnosis Date     Acute ischemic left MCA stroke (H) 3/22/15     Essential hypertension, benign           Past Surgical History             Past Surgical History:   Procedure Laterality Date     NO HISTORY OF SURGERY             Current Outpatient Prescriptions               Current Outpatient Medications   Medication Sig Dispense Refill     aspirin (ASA) 325 MG tablet TAKE 1 TABLET BY MOUTH EVERY DAY 90 tablet 1     azelastine (OPTIVAR) 0.05 % ophthalmic solution Place 1 drop into both eyes 2 times daily as needed (for itchy water eyes) 5 mL 11     baclofen (LIORESAL) 20 MG tablet Take 1 tablet (20 mg) by mouth 3 times daily 270 tablet 3     escitalopram (LEXAPRO) 10 MG tablet Take 1 tablet (10 mg) by mouth daily 30 tablet 11     gabapentin (NEURONTIN) 400 MG capsule Take 1 capsule (400 mg) by mouth 3 times daily for arm mobility. 90 capsule 11     hydrochlorothiazide (HYDRODIURIL) 12.5 MG tablet Take 1 tablet (12.5 mg) by mouth daily as needed for blood pressure. 90 tablet 1     losartan (COZAAR) 50 MG tablet Take 1 tablet (50 mg) by mouth daily as needed for blood pressure. 90 tablet 1     metoprolol succinate ER (TOPROL-XL) 50 MG 24 hr tablet Take 1 tablet (50 mg) by mouth daily for blood pressure. 90 tablet 1     nabumetone (RELAFEN) 500 MG tablet TAKE 1-2 TABLETS (500-1,000 MG) BY MOUTH 2 TIMES DAILY AS NEEDED FOR PAIN IN ARM OR HIP WITH FOOD 60 tablet 1     pantoprazole (PROTONIX) 40 MG EC tablet Take 1 tablet (40 mg) by mouth daily for reflux. 90 tablet 3     SENEXON-S 8.6-50 MG tablet TAKE 1 TABLET BY MOUTH DAILY AS NEEDED FOR CONSTIPATION 30 tablet 4     simvastatin (ZOCOR) 20 MG tablet Take 1 tablet (20 mg) by mouth every evening for cholesterol. 90 tablet 1     triamcinolone (KENALOG) 0.1 % cream Apply sparingly to affected area three times daily as needed 80 g 0     VITAMIN D3 25 MCG (1000 UT) tablet TAKE 1 TABLET BY MOUTH EVERY DAY 30 tablet 10            /67 (BP Location: Left arm,  Patient Position: Sitting, Cuff Size: Adult Regular)   Pulse 60   LMP  (LMP Unknown)   SpO2 94%          On examination, the patient is in her manual wheelchair.  She transfers with assist of her .  She has involvement of right levator scapula, right pectoralis major, right biceps brachii, flexor carpi ulnaris and flexor carpi radialis, flexor digitorum superficialis and flexor digitorum profundus.  She has increased tone in the flexor pollicis longus and  lumbricals. In the lower extremity . EHL. FDL and gastrocnemius are tight.  She is also tender over the left side of the neck where she is complaining of pain currently including the occipital region.  The right side is nontender.  The low back is nontender.     Impression: Right spastic hemiparesis, torticollis, gait abnormality and spasm of muscles due to cerebrovascular accident.  Cervical spondylosis on the left side.     Based on today's assessment, she would benefit from 600 units of Botox injections.  I will see her in follow-up in about a month's time to see how she is responding and plan future treatments every 12 weeks.     20 minutes for the evaluation and management portion of the visit, greater than 50% was for CC.     Procedure note: With her informed consent, after explaining the benefits and risks of the procedure, and using Betasept for skin prep, EMG for localization, preservative-free normal saline for dilution, 600 units of Botox, 100 units were injected into the right pectoralis major,  25 needs to right flexor pollicis longus, 50 unis for injected into the following muscles lumbricals, 50 units into flexor carpi radialis, 50 units into flexor digitorum Superficialis, flexor digitorum profundus.  75 units each were injected into the biceps brachii,  50 units into FDL, EHL and 100 units into the gastrocnemius.. 600 units were utilized in all.  She tolerated it well.  She will use ice, Tylenol as needed.     Cheng Jean,  MD

## 2023-03-07 NOTE — NURSING NOTE
Chief Complaint   Patient presents with     RECHECK     Botox injections confirmed with patient     Cuca Zarate CMA at 12:55 PM on 3/7/2023.

## 2023-03-20 ENCOUNTER — TELEPHONE (OUTPATIENT)
Dept: PHYSICAL MEDICINE AND REHAB | Facility: CLINIC | Age: 63
End: 2023-03-20

## 2023-03-20 ENCOUNTER — OFFICE VISIT (OUTPATIENT)
Dept: FAMILY MEDICINE | Facility: CLINIC | Age: 63
End: 2023-03-20
Payer: MEDICARE

## 2023-03-20 VITALS
TEMPERATURE: 98.3 F | SYSTOLIC BLOOD PRESSURE: 97 MMHG | HEART RATE: 70 BPM | DIASTOLIC BLOOD PRESSURE: 64 MMHG | OXYGEN SATURATION: 93 %

## 2023-03-20 DIAGNOSIS — K21.9 GASTROESOPHAGEAL REFLUX DISEASE WITHOUT ESOPHAGITIS: ICD-10-CM

## 2023-03-20 DIAGNOSIS — I10 ESSENTIAL HYPERTENSION WITH GOAL BLOOD PRESSURE LESS THAN 140/90: Primary | ICD-10-CM

## 2023-03-20 DIAGNOSIS — I69.920 APHASIA, LATE EFFECT OF CEREBROVASCULAR DISEASE: ICD-10-CM

## 2023-03-20 DIAGNOSIS — Z86.73 HISTORY OF CVA (CEREBROVASCULAR ACCIDENT): ICD-10-CM

## 2023-03-20 DIAGNOSIS — F33.0 MILD EPISODE OF RECURRENT MAJOR DEPRESSIVE DISORDER (H): ICD-10-CM

## 2023-03-20 DIAGNOSIS — Z23 NEED FOR VACCINATION: ICD-10-CM

## 2023-03-20 LAB
ERYTHROCYTE [DISTWIDTH] IN BLOOD BY AUTOMATED COUNT: 11.5 % (ref 10–15)
HCT VFR BLD AUTO: 34.9 % (ref 35–47)
HGB BLD-MCNC: 11.8 G/DL (ref 11.7–15.7)
MCH RBC QN AUTO: 33.5 PG (ref 26.5–33)
MCHC RBC AUTO-ENTMCNC: 33.8 G/DL (ref 31.5–36.5)
MCV RBC AUTO: 99 FL (ref 78–100)
PLATELET # BLD AUTO: 174 10E3/UL (ref 150–450)
RBC # BLD AUTO: 3.52 10E6/UL (ref 3.8–5.2)
WBC # BLD AUTO: 3.8 10E3/UL (ref 4–11)

## 2023-03-20 PROCEDURE — 91312 COVID-19 VACCINE BIVALENT BOOSTER 12+ (PFIZER): CPT | Performed by: FAMILY MEDICINE

## 2023-03-20 PROCEDURE — 99214 OFFICE O/P EST MOD 30 MIN: CPT | Mod: 25 | Performed by: FAMILY MEDICINE

## 2023-03-20 PROCEDURE — 84450 TRANSFERASE (AST) (SGOT): CPT | Performed by: FAMILY MEDICINE

## 2023-03-20 PROCEDURE — 0124A COVID-19 VACCINE BIVALENT BOOSTER 12+ (PFIZER): CPT | Performed by: FAMILY MEDICINE

## 2023-03-20 PROCEDURE — 82565 ASSAY OF CREATININE: CPT | Performed by: FAMILY MEDICINE

## 2023-03-20 PROCEDURE — 36415 COLL VENOUS BLD VENIPUNCTURE: CPT | Performed by: FAMILY MEDICINE

## 2023-03-20 PROCEDURE — 84460 ALANINE AMINO (ALT) (SGPT): CPT | Performed by: FAMILY MEDICINE

## 2023-03-20 PROCEDURE — 84132 ASSAY OF SERUM POTASSIUM: CPT | Performed by: FAMILY MEDICINE

## 2023-03-20 PROCEDURE — 80061 LIPID PANEL: CPT | Performed by: FAMILY MEDICINE

## 2023-03-20 PROCEDURE — 85027 COMPLETE CBC AUTOMATED: CPT | Performed by: FAMILY MEDICINE

## 2023-03-20 RX ORDER — HYDROCHLOROTHIAZIDE 12.5 MG/1
12.5 TABLET ORAL DAILY PRN
Qty: 90 TABLET | Refills: 0 | Status: SHIPPED | OUTPATIENT
Start: 2023-03-20 | End: 2023-06-05

## 2023-03-20 RX ORDER — PANTOPRAZOLE SODIUM 40 MG/1
40 TABLET, DELAYED RELEASE ORAL DAILY
Qty: 90 TABLET | Refills: 2 | Status: SHIPPED | OUTPATIENT
Start: 2023-03-20 | End: 2023-09-12

## 2023-03-20 RX ORDER — GABAPENTIN 400 MG/1
400 CAPSULE ORAL 3 TIMES DAILY
Qty: 90 CAPSULE | Refills: 0 | Status: SHIPPED | OUTPATIENT
Start: 2023-03-20 | End: 2024-03-21

## 2023-03-20 RX ORDER — LOSARTAN POTASSIUM 50 MG/1
50 TABLET ORAL DAILY PRN
Qty: 90 TABLET | Refills: 0 | Status: SHIPPED | OUTPATIENT
Start: 2023-03-20 | End: 2023-06-05

## 2023-03-20 RX ORDER — METOPROLOL SUCCINATE 50 MG/1
50 TABLET, EXTENDED RELEASE ORAL DAILY
Qty: 90 TABLET | Refills: 3 | Status: CANCELLED | OUTPATIENT
Start: 2023-03-20

## 2023-03-20 RX ORDER — SIMVASTATIN 20 MG
20 TABLET ORAL EVERY EVENING
Qty: 90 TABLET | Refills: 0 | Status: SHIPPED | OUTPATIENT
Start: 2023-03-20 | End: 2023-09-12

## 2023-03-20 ASSESSMENT — PATIENT HEALTH QUESTIONNAIRE - PHQ9: SUM OF ALL RESPONSES TO PHQ QUESTIONS 1-9: 12

## 2023-03-20 ASSESSMENT — PAIN SCALES - GENERAL: PAINLEVEL: NO PAIN (0)

## 2023-03-20 NOTE — PROGRESS NOTES
Assessment & Plan     (I10) Essential hypertension with goal blood pressure less than 140/90  (primary encounter diagnosis)  Comment: well controlled, with a low normal BP during today's visit. The patient feels fine.  Plan: hydrochlorothiazide (HYDRODIURIL) 12.5 MG         tablet, losartan (COZAAR) 50 MG tablet,         Creatinine, Potassium, CBC with platelets        Return in about 6 months (around 9/6/2023) for Medicare annual wellness exam, blood pressure check, cholesterol.      (Z86.73) History of CVA (cerebrovascular accident)  (I69.920) Aphasia, late effect of cerebrovascular disease  Comment: Fasting. As usual, she is on a moderate-intensity statin for her stroke history but did not have a baseline diagnosis of hyperlipidemia.  Plan: simvastatin (ZOCOR) 20 MG tablet, Lipid panel         reflex to direct LDL Non-fasting, ALT, AST        Recheck lipids in 6-12 months     (K21.9) Gastroesophageal reflux disease without esophagitis  Comment: prescription update   Plan: pantoprazole (PROTONIX) 40 MG EC tablet            (Z23) Need for vaccination  Comment:   Plan: COVID-19,PF,PFIZER BOOSTER BIVALENT (12+YRS)            Depression Screening Follow Up    PHQ 3/20/2023   PHQ-9 Total Score 12   Q9: Thoughts of better off dead/self-harm past 2 weeks Several days     Last PHQ-9 3/20/2023   1.  Little interest or pleasure in doing things 1   2.  Feeling down, depressed, or hopeless 1   3.  Trouble falling or staying asleep, or sleeping too much 2   4.  Feeling tired or having little energy 0   5.  Poor appetite or overeating 1   6.  Feeling bad about yourself 1   7.  Trouble concentrating 2   8.  Moving slowly or restless 3   Q9: Thoughts of better off dead/self-harm past 2 weeks 1   PHQ-9 Total Score 12   Difficulty at work, home, or with people Somewhat difficult         Follow Up      Follow Up Actions Taken  Mental Health Referral placed        Mulugeta Sharma MD  [facility]  ZAIDA Howard is a 62 year old accompanied by her spouse and  (our service), presenting for the following health issues:  Hypertension      HPI     Hypertension Follow-up      Do you check your blood pressure regularly outside of the clinic? Yes     Are you following a low salt diet? Yes    Are your blood pressures ever more than 140 on the top number (systolic) OR more   than 90 on the bottom number (diastolic), for example 140/90? Yes      How many servings of fruits and vegetables do you eat daily?  2-3    On average, how many sweetened beverages do you drink each day (Examples: soda, juice, sweet tea, etc.  Do NOT count diet or artificially sweetened beverages)?   0    How many days per week do you exercise enough to make your heart beat faster? 3 or less    How many minutes a day do you exercise enough to make your heart beat faster? 9 or less    How many days per week do you miss taking your medication? 0    The patient's  reports no problems with the patient's current blood pressure medications. The patient's  adds that the SBP readings at home tend to be in the 120s, but will occasionally go up to 150.      The patient's  reports that the patient takes nabumetone daily.    Review of Systems         Objective    BP 97/64   Pulse 70   Temp 98.3  F (36.8  C) (Oral)   LMP  (LMP Unknown)   SpO2 93%   There is no height or weight on file to calculate BMI.  Physical Exam   GENERAL: healthy, alert and no distress, smiling  EYES: Eyes grossly normal to inspection, PERRL, EOMI, sclerae white and conjunctivae normal  RESP: lungs clear to auscultation - no crackles or wheezes, no areas of dullness, no tachypnea  CV: Heart regular rate and rhythm without murmur, click or rub. No peripheral edema and peripheral pulses strong  MS: no gross musculoskeletal defects noted, no edema  SKIN: no suspicious lesions or rashes to visible skin                This document serves as  a record of the services and decisions personally performed and made by Dr. Sharma. It was created on his behalf by Vincenzo Thomas, a trained medical scribe. The creation of this document is based the provider's statements to the medical scribe.  Vincenzo Thomas,  12:18 PM

## 2023-03-20 NOTE — PATIENT INSTRUCTIONS
At [facility], we strive to deliver an exceptional experience to you, every time we see you. If you receive a survey, please complete it as we do value your feedback.  If you have MyChart, you can expect to receive results automatically within 24 hours of their completion.  Your provider will send a note interpreting your results as well.   If you do not have MyChart, you should receive your results in about a week by mail.    Your care team:                            Family Medicine Internal Medicine   MD Sánchez Miller MD Shantel Branch-Fleming, MD Srinivasa Vaka, MD Katya Belousova, PAELKIN Callahan CNP, MD ([facility]   Yuri Lara, MD Stacey Paz MD Amelia Massimini APRN LEXIE Choudhury APRN MD Kailyn Dominguez MD          Clinic hours: Monday - Thursday 7 am-6 pm; Fridays 7 am-5 pm.   Urgent care: Monday - Friday 10 am- 8 pm; Saturday and Sunday 9 am-5 pm.    Clinic: [phone]       [facility]: Monday - Thursday 8 am - 7 pm; Friday 8 am - 6 pm  [facility]: [phone]

## 2023-03-20 NOTE — TELEPHONE ENCOUNTER
[facility] Call Center    Phone Message    May a detailed message be left on voicemail: yes     Reason for Call: Medication Refill Request    Has the patient contacted the pharmacy for the refill? Yes   Name of medication being requested: Baclofen 20 mg  And  Gabapentin 400 mg  Provider who prescribed the medication: Miranda   Pharmacy: [facility] in [place]  Date medication is needed: ASAP         Action Taken: Message routed to:  [facility]    Travel Screening: Not Applicable

## 2023-03-20 NOTE — TELEPHONE ENCOUNTER
Called patient and her daughter Anamika back and notified that the medication was refilled by her PCP today.     No further questions or concerns at this time.    DENA Phillips RN Care Coordinator  [facility]   [facility] main phone # [phone] fax # [phone]

## 2023-03-21 LAB
ALT SERPL W P-5'-P-CCNC: 22 U/L (ref 0–50)
AST SERPL W P-5'-P-CCNC: 15 U/L (ref 0–45)
CHOLEST SERPL-MCNC: 135 MG/DL
CREAT SERPL-MCNC: 1 MG/DL (ref 0.52–1.04)
FASTING STATUS PATIENT QL REPORTED: YES
GFR SERPL CREATININE-BSD FRML MDRD: 64 ML/MIN/1.73M2
HDLC SERPL-MCNC: 42 MG/DL
LDLC SERPL CALC-MCNC: 64 MG/DL
NONHDLC SERPL-MCNC: 93 MG/DL
POTASSIUM BLD-SCNC: 4.1 MMOL/L (ref 3.4–5.3)
TRIGL SERPL-MCNC: 147 MG/DL

## 2023-03-24 ENCOUNTER — ANCILLARY PROCEDURE (OUTPATIENT)
Dept: MRI IMAGING | Facility: CLINIC | Age: 63
End: 2023-03-24
Attending: PHYSICAL MEDICINE & REHABILITATION
Payer: MEDICARE

## 2023-03-24 DIAGNOSIS — M47.812 CERVICAL SPONDYLOSIS WITHOUT MYELOPATHY: ICD-10-CM

## 2023-03-24 PROCEDURE — G1010 CDSM STANSON: HCPCS | Mod: GC | Performed by: STUDENT IN AN ORGANIZED HEALTH CARE EDUCATION/TRAINING PROGRAM

## 2023-03-24 PROCEDURE — 72141 MRI NECK SPINE W/O DYE: CPT | Mod: MF | Performed by: STUDENT IN AN ORGANIZED HEALTH CARE EDUCATION/TRAINING PROGRAM

## 2023-03-27 ENCOUNTER — TELEPHONE (OUTPATIENT)
Dept: PHYSICAL MEDICINE AND REHAB | Facility: CLINIC | Age: 63
End: 2023-03-27
Payer: MEDICARE

## 2023-03-27 DIAGNOSIS — G81.11 RIGHT SPASTIC HEMIPARESIS (H): ICD-10-CM

## 2023-03-27 DIAGNOSIS — G89.29 OTHER CHRONIC PAIN: ICD-10-CM

## 2023-03-27 DIAGNOSIS — M62.838 SPASM OF MUSCLE: ICD-10-CM

## 2023-03-27 DIAGNOSIS — Z86.73 HISTORY OF CVA (CEREBROVASCULAR ACCIDENT): ICD-10-CM

## 2023-03-27 DIAGNOSIS — R26.9 ABNORMAL GAIT: ICD-10-CM

## 2023-03-27 RX ORDER — GABAPENTIN 400 MG/1
400 CAPSULE ORAL 3 TIMES DAILY
Qty: 270 CAPSULE | Refills: 3 | Status: SHIPPED | OUTPATIENT
Start: 2023-04-20 | End: 2023-06-20

## 2023-03-27 RX ORDER — BACLOFEN 20 MG/1
20 TABLET ORAL 3 TIMES DAILY
Qty: 270 TABLET | Refills: 3 | Status: SHIPPED | OUTPATIENT
Start: 2023-03-27 | End: 2023-06-20

## 2023-03-27 NOTE — TELEPHONE ENCOUNTER
Writer called and left voicemail for daughter that the prescriptions that were requested have been filled and sent to patient's pharmacy.    Simona Aponte RN on 3/27/2023 at 1:29 PM

## 2023-03-27 NOTE — TELEPHONE ENCOUNTER
Called patient with [email or web address]   ID # [id number] to relay provider message:    ----- Message from Cheng Jean MD sent at 3/24/2023  4:51 PM CDT -----  No major concerns. Please let patient/family know. Thanks.    MK    1. Multilevel mild cervical spondylosis most apparent at C5-6 where  there is mild right-sided neural foraminal stenosis.  2. 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     Continue as planned.    Cheng Jean MD     Patient did not answer, left message for patient and also left clinic contact information to call back if she has any questions.     DENA Phillips RN Care Coordinator  [facility]   [facility] main phone # [phone] fax # [phone]

## 2023-03-27 NOTE — TELEPHONE ENCOUNTER
Gabapentin (Neurontin) 400mg capsule, take 1 capsule by mouth 3 times daily for 30 days    Last written: 3/20/23 (by PCP, temp supply), #90, 0 RF  Last OV: 3/7/23  Next OV: 6/20/23    Baclofen 20mg tablet, take 1 tablet by mouth 3 times daily    Last written: 3/30/22, #270, 3 RF  Last OV: 3/7/23  Next OV: 6/20/23    Routing to Dr. Jean for review and authorization, if appropriate.    Simona Aponte RN on 3/27/2023 at 11:50 AM

## 2023-03-27 NOTE — TELEPHONE ENCOUNTER
[facility] Call [facility]    Phone Message    May a detailed message be left on voicemail: yes     Reason for Call: Medication Refill Request    Has the patient contacted the pharmacy for the refill? Yes   Name of medication being requested: gabapentin (NEURONTIN) 400 MG capsule  baclofen (LIORESAL) 20 MG tablet  Provider who prescribed the medication: Dr. Ruth  Pharmacy: [facility] [address] [address] [address]  Date medication is needed: ASAP        Action Taken: Message routed to:  [facility]    Travel Screening: Not Applicable

## 2023-03-29 RX ORDER — GABAPENTIN 400 MG/1
400 CAPSULE ORAL 3 TIMES DAILY
Qty: 90 CAPSULE | Refills: 0 | OUTPATIENT
Start: 2023-03-29 | End: 2023-04-28

## 2023-04-03 DIAGNOSIS — F32.89 ATYPICAL DEPRESSIVE DISORDER: ICD-10-CM

## 2023-04-03 RX ORDER — ESCITALOPRAM OXALATE 10 MG/1
10 TABLET ORAL DAILY
Qty: 90 TABLET | Refills: 3 | Status: SHIPPED | OUTPATIENT
Start: 2023-04-03 | End: 2023-06-20

## 2023-04-03 NOTE — TELEPHONE ENCOUNTER
Refill request received from patient's family    Medication: escitalopram (LEXAPRO) 10 MG tablet  Directions: Take 1 tablet (10 mg) by mouth daily     Last ordered: 3/1/2022   Qty: 90  RF: 3  Last OV: 3/7/23  Next OV: 6/20/23    Routing to Dr. Jean to review and sign if appropriate.    DENA Phillips RN Care Coordinator  [facility]   [facility] main phone # [phone] fax # [phone]

## 2023-04-03 NOTE — TELEPHONE ENCOUNTER
[facility] Call [facility]    Phone Message    May a detailed message be left on voicemail: yes     Reason for Call: Medication Refill Request    Has the patient contacted the pharmacy for the refill? Yes   Name of medication being requested: escitalopram (LEXAPRO) 10 MG tablet  Provider who prescribed the medication: Dr. Jean  Pharmacy: [facility] [id number] [facility] [address] [place] [address]  Date medication is needed: ASAP        Action Taken: Message routed to:  [facility]    Travel Screening: Not Applicable

## 2023-04-23 ENCOUNTER — HEALTH MAINTENANCE LETTER (OUTPATIENT)
Age: 63
End: 2023-04-23

## 2023-06-04 DIAGNOSIS — Z53.9 DIAGNOSIS NOT YET DEFINED: ICD-10-CM

## 2023-06-04 DIAGNOSIS — I10 ESSENTIAL HYPERTENSION WITH GOAL BLOOD PRESSURE LESS THAN 140/90: ICD-10-CM

## 2023-06-04 DIAGNOSIS — E55.9 VITAMIN D DEFICIENCY: ICD-10-CM

## 2023-06-05 RX ORDER — CHOLECALCIFEROL (VITAMIN D3) 25 MCG
TABLET ORAL
Qty: 90 TABLET | Refills: 0 | Status: SHIPPED | OUTPATIENT
Start: 2023-06-05 | End: 2023-07-03

## 2023-06-05 RX ORDER — LOSARTAN POTASSIUM 50 MG/1
50 TABLET ORAL DAILY PRN
Qty: 90 TABLET | Refills: 0 | Status: SHIPPED | OUTPATIENT
Start: 2023-06-05 | End: 2023-09-12

## 2023-06-05 RX ORDER — HYDROCHLOROTHIAZIDE 12.5 MG/1
12.5 TABLET ORAL DAILY PRN
Qty: 90 TABLET | Refills: 0 | Status: SHIPPED | OUTPATIENT
Start: 2023-06-05 | End: 2023-09-12

## 2023-06-05 RX ORDER — DOCUSATE SODIUM 50MG AND SENNOSIDES 8.6MG 8.6; 5 MG/1; MG/1
TABLET, FILM COATED ORAL
Qty: 30 TABLET | Refills: 0 | Status: SHIPPED | OUTPATIENT
Start: 2023-06-05 | End: 2023-07-03

## 2023-06-20 ENCOUNTER — OFFICE VISIT (OUTPATIENT)
Dept: PHYSICAL MEDICINE AND REHAB | Facility: CLINIC | Age: 63
End: 2023-06-20
Payer: MEDICARE

## 2023-06-20 VITALS — HEART RATE: 60 BPM | SYSTOLIC BLOOD PRESSURE: 111 MMHG | OXYGEN SATURATION: 95 % | DIASTOLIC BLOOD PRESSURE: 73 MMHG

## 2023-06-20 DIAGNOSIS — G89.29 OTHER CHRONIC PAIN: ICD-10-CM

## 2023-06-20 DIAGNOSIS — G81.11 RIGHT SPASTIC HEMIPARESIS (H): Primary | ICD-10-CM

## 2023-06-20 DIAGNOSIS — M62.838 SPASM OF MUSCLE: ICD-10-CM

## 2023-06-20 DIAGNOSIS — M25.511 RIGHT SHOULDER PAIN, UNSPECIFIED CHRONICITY: ICD-10-CM

## 2023-06-20 DIAGNOSIS — Z86.73 HISTORY OF CVA (CEREBROVASCULAR ACCIDENT): ICD-10-CM

## 2023-06-20 DIAGNOSIS — M79.601 RIGHT UPPER LIMB PAIN: ICD-10-CM

## 2023-06-20 DIAGNOSIS — G24.8 FOCAL DYSTONIA: ICD-10-CM

## 2023-06-20 DIAGNOSIS — R26.9 ABNORMAL GAIT: ICD-10-CM

## 2023-06-20 DIAGNOSIS — F32.89 ATYPICAL DEPRESSIVE DISORDER: ICD-10-CM

## 2023-06-20 PROCEDURE — 64643 CHEMODENERV 1 EXTREM 1-4 EA: CPT | Performed by: PHYSICAL MEDICINE & REHABILITATION

## 2023-06-20 PROCEDURE — 64644 CHEMODENERV 1 EXTREM 5/> MUS: CPT | Performed by: PHYSICAL MEDICINE & REHABILITATION

## 2023-06-20 PROCEDURE — 95874 GUIDE NERV DESTR NEEDLE EMG: CPT | Performed by: PHYSICAL MEDICINE & REHABILITATION

## 2023-06-20 RX ORDER — GABAPENTIN 400 MG/1
400 CAPSULE ORAL 3 TIMES DAILY
Qty: 270 CAPSULE | Refills: 3 | Status: SHIPPED | OUTPATIENT
Start: 2023-06-20 | End: 2024-07-08

## 2023-06-20 RX ORDER — NABUMETONE 500 MG/1
500 TABLET, FILM COATED ORAL 2 TIMES DAILY WITH MEALS
Qty: 180 TABLET | Refills: 3 | Status: SHIPPED | OUTPATIENT
Start: 2023-06-20 | End: 2024-07-08

## 2023-06-20 RX ORDER — ESCITALOPRAM OXALATE 10 MG/1
10 TABLET ORAL DAILY
Qty: 90 TABLET | Refills: 3 | Status: SHIPPED | OUTPATIENT
Start: 2023-06-20 | End: 2024-06-25

## 2023-06-20 RX ORDER — BACLOFEN 20 MG/1
20 TABLET ORAL 3 TIMES DAILY
Qty: 270 TABLET | Refills: 3 | Status: SHIPPED | OUTPATIENT
Start: 2023-06-20 | End: 2024-06-24

## 2023-06-20 ASSESSMENT — PAIN SCALES - GENERAL: PAINLEVEL: NO PAIN (0)

## 2023-06-20 NOTE — PROGRESS NOTES
The patient returns for a follow-up visit for her ongoing issues related to spastic hemiparesis affecting the right side.     She has been  seen by me previously at [facility] [place].      Last seen here on 3/7/23.  Botox done helped.     She has been complaining of pain in the left side of the neck and the lower back.  She has previously responded nicely to injection therapy with medial branch blocks to the lumbar and cervical region.  Both diagnostic and confirmatory blocks were done with good relief.  This was done in July and August 2019.  She has done physical therapy without relief and continues to do home exercise program without relief.  She has undergone diagnostic medial branch blocks for the left cervical and lumbar spine.  Due to aphasia, her pain diary is difficult.  Her spouse tells me that she was happy, moving better and not complaining of her usual pain for the duration of the anesthetic.  This was about 3 hours for the neck and more than 6 hours for the lower back.  Her pain has since returned.  Due to the number of years since the last 1 was done, I will repeat an MRI of the cervical spine looking for any other causes for her pain.  If none is found, I will repeat the medial branch blocks from C2-C5 including the third occipital nerves on the left side and do diagnostic and confirmatory test and if she gets good relief from this, she would be a candidate for radiofrequency ablations.  Due to her significant spasticity and stroke, she is not a candidate for the conventional physical therapy.  This may have to be done if insurance insists.  In the past she has had good relief with just the medial branch blocks and did not even proceed to radiofrequency ablations.        She reports her function is better and she is more independent now.  She has history of sadness and is doing quite well currently and  is tolerating her antidepressant.        Past Medical History           Past Medical  History:   Diagnosis Date     Acute ischemic left MCA stroke (H) 3/22/15     Essential hypertension, benign           Past Surgical History             Past Surgical History:   Procedure Laterality Date     NO HISTORY OF SURGERY             Current Outpatient Prescriptions               Current Outpatient Medications   Medication Sig Dispense Refill     aspirin (ASA) 325 MG tablet TAKE 1 TABLET BY MOUTH EVERY DAY 90 tablet 1     azelastine (OPTIVAR) 0.05 % ophthalmic solution Place 1 drop into both eyes 2 times daily as needed (for itchy water eyes) 5 mL 11     baclofen (LIORESAL) 20 MG tablet Take 1 tablet (20 mg) by mouth 3 times daily 270 tablet 3     escitalopram (LEXAPRO) 10 MG tablet Take 1 tablet (10 mg) by mouth daily 30 tablet 11     gabapentin (NEURONTIN) 400 MG capsule Take 1 capsule (400 mg) by mouth 3 times daily for arm mobility. 90 capsule 11     hydrochlorothiazide (HYDRODIURIL) 12.5 MG tablet Take 1 tablet (12.5 mg) by mouth daily as needed for blood pressure. 90 tablet 1     losartan (COZAAR) 50 MG tablet Take 1 tablet (50 mg) by mouth daily as needed for blood pressure. 90 tablet 1     metoprolol succinate ER (TOPROL-XL) 50 MG 24 hr tablet Take 1 tablet (50 mg) by mouth daily for blood pressure. 90 tablet 1     nabumetone (RELAFEN) 500 MG tablet TAKE 1-2 TABLETS (500-1,000 MG) BY MOUTH 2 TIMES DAILY AS NEEDED FOR PAIN IN ARM OR HIP WITH FOOD 60 tablet 1     pantoprazole (PROTONIX) 40 MG EC tablet Take 1 tablet (40 mg) by mouth daily for reflux. 90 tablet 3     SENEXON-S 8.6-50 MG tablet TAKE 1 TABLET BY MOUTH DAILY AS NEEDED FOR CONSTIPATION 30 tablet 4     simvastatin (ZOCOR) 20 MG tablet Take 1 tablet (20 mg) by mouth every evening for cholesterol. 90 tablet 1     triamcinolone (KENALOG) 0.1 % cream Apply sparingly to affected area three times daily as needed 80 g 0     VITAMIN D3 25 MCG (1000 UT) tablet TAKE 1 TABLET BY MOUTH EVERY DAY 30 tablet 10            LMP  (LMP Unknown)       On  examination, the patient is in her manual wheelchair.  She transfers with assist of her .  She has involvement of right levator scapula, right pectoralis major, right biceps brachii, flexor carpi ulnaris and flexor carpi radialis, flexor digitorum superficialis and flexor digitorum profundus.  She has increased tone in the flexor pollicis longus and  lumbricals. In the lower extremity . EHL. FDL and gastrocnemius are tight.  She is also tender over the left side of the neck where she is complaining of pain currently including the occipital region.  The right side is nontender.  The low back is nontender.     Impression: Right spastic hemiparesis, torticollis, gait abnormality and spasm of muscles due to cerebrovascular accident.  Cervical spondylosis on the left side.     Based on today's assessment, she would benefit from 600 units of Botox injections.  I will see her in follow-up in about a month's time to see how she is responding and plan future treatments every 12 weeks.     20 minutes for the evaluation and management portion of the visit, greater than 50% was for CC.     Procedure note: With her informed consent, after explaining the benefits and risks of the procedure, and using Betasept for skin prep, EMG for localization, preservative-free normal saline for dilution, 600 units of Botox, 100 units were injected into the right pectoralis major,  25 needs to right flexor pollicis longus, 50 unis for injected into the following muscles lumbricals, 50 units into flexor carpi radialis, 50 units into flexor digitorum Superficialis, flexor digitorum profundus.  75 units each were injected into the biceps brachii,  50 units into FDL, EHL and 100 units into the gastrocnemius.. 600 units were utilized in all.  She tolerated it well.  She will use ice, Tylenol as needed.     Cheng Jean MD

## 2023-06-20 NOTE — LETTER
6/20/2023       RE: Anita Bennett  [address]  [place] [address]     Dear Colleague,    Thank you for referring your patient, Anita Bennett, to the [facility] [place] at [facility]. Please see a copy of my visit note below.    The patient returns for a follow-up visit for her ongoing issues related to spastic hemiparesis affecting the right side.     She has been  seen by me previously at [facility].      Last seen here on 3/7/23.  Botox done helped.     She has been complaining of pain in the left side of the neck and the lower back.  She has previously responded nicely to injection therapy with medial branch blocks to the lumbar and cervical region.  Both diagnostic and confirmatory blocks were done with good relief.  This was done in July and August 2019.  She has done physical therapy without relief and continues to do home exercise program without relief.  She has undergone diagnostic medial branch blocks for the left cervical and lumbar spine.  Due to aphasia, her pain diary is difficult.  Her spouse tells me that she was happy, moving better and not complaining of her usual pain for the duration of the anesthetic.  This was about 3 hours for the neck and more than 6 hours for the lower back.  Her pain has since returned.  Due to the number of years since the last 1 was done, I will repeat an MRI of the cervical spine looking for any other causes for her pain.  If none is found, I will repeat the medial branch blocks from C2-C5 including the third occipital nerves on the left side and do diagnostic and confirmatory test and if she gets good relief from this, she would be a candidate for radiofrequency ablations.  Due to her significant spasticity and stroke, she is not a candidate for the conventional physical therapy.  This may have to be done if insurance insists.  In the past she  has had good relief with just the medial branch blocks and did not even proceed to radiofrequency ablations.        She reports her function is better and she is more independent now.  She has history of sadness and is doing quite well currently and  is tolerating her antidepressant.        Past Medical History           Past Medical History:   Diagnosis Date    Acute ischemic left MCA stroke (H) 3/22/15    Essential hypertension, benign           Past Surgical History             Past Surgical History:   Procedure Laterality Date    NO HISTORY OF SURGERY             Current Outpatient Prescriptions               Current Outpatient Medications   Medication Sig Dispense Refill    aspirin (ASA) 325 MG tablet TAKE 1 TABLET BY MOUTH EVERY DAY 90 tablet 1    azelastine (OPTIVAR) 0.05 % ophthalmic solution Place 1 drop into both eyes 2 times daily as needed (for itchy water eyes) 5 mL 11    baclofen (LIORESAL) 20 MG tablet Take 1 tablet (20 mg) by mouth 3 times daily 270 tablet 3    escitalopram (LEXAPRO) 10 MG tablet Take 1 tablet (10 mg) by mouth daily 30 tablet 11    gabapentin (NEURONTIN) 400 MG capsule Take 1 capsule (400 mg) by mouth 3 times daily for arm mobility. 90 capsule 11    hydrochlorothiazide (HYDRODIURIL) 12.5 MG tablet Take 1 tablet (12.5 mg) by mouth daily as needed for blood pressure. 90 tablet 1    losartan (COZAAR) 50 MG tablet Take 1 tablet (50 mg) by mouth daily as needed for blood pressure. 90 tablet 1    metoprolol succinate ER (TOPROL-XL) 50 MG 24 hr tablet Take 1 tablet (50 mg) by mouth daily for blood pressure. 90 tablet 1    nabumetone (RELAFEN) 500 MG tablet TAKE 1-2 TABLETS (500-1,000 MG) BY MOUTH 2 TIMES DAILY AS NEEDED FOR PAIN IN ARM OR HIP WITH FOOD 60 tablet 1    pantoprazole (PROTONIX) 40 MG EC tablet Take 1 tablet (40 mg) by mouth daily for reflux. 90 tablet 3    SENEXON-S 8.6-50 MG tablet TAKE 1 TABLET BY MOUTH DAILY AS NEEDED FOR CONSTIPATION 30 tablet 4    simvastatin (ZOCOR) 20 MG  tablet Take 1 tablet (20 mg) by mouth every evening for cholesterol. 90 tablet 1    triamcinolone (KENALOG) 0.1 % cream Apply sparingly to affected area three times daily as needed 80 g 0    VITAMIN D3 25 MCG (1000 UT) tablet TAKE 1 TABLET BY MOUTH EVERY DAY 30 tablet 10            LMP  (LMP Unknown)       On examination, the patient is in her manual wheelchair.  She transfers with assist of her .  She has involvement of right levator scapula, right pectoralis major, right biceps brachii, flexor carpi ulnaris and flexor carpi radialis, flexor digitorum superficialis and flexor digitorum profundus.  She has increased tone in the flexor pollicis longus and  lumbricals. In the lower extremity . EHL. FDL and gastrocnemius are tight.  She is also tender over the left side of the neck where she is complaining of pain currently including the occipital region.  The right side is nontender.  The low back is nontender.     Impression: Right spastic hemiparesis, torticollis, gait abnormality and spasm of muscles due to cerebrovascular accident.  Cervical spondylosis on the left side.     Based on today's assessment, she would benefit from 600 units of Botox injections.  I will see her in follow-up in about a month's time to see how she is responding and plan future treatments every 12 weeks.     20 minutes for the evaluation and management portion of the visit, greater than 50% was for CC.     Procedure note: With her informed consent, after explaining the benefits and risks of the procedure, and using Betasept for skin prep, EMG for localization, preservative-free normal saline for dilution, 600 units of Botox, 100 units were injected into the right pectoralis major,  25 needs to right flexor pollicis longus, 50 unis for injected into the following muscles lumbricals, 50 units into flexor carpi radialis, 50 units into flexor digitorum Superficialis, flexor digitorum profundus.  75 units each were injected into the  biceps brachii,  50 units into FDL, EHL and 100 units into the gastrocnemius.. 600 units were utilized in all.  She tolerated it well.  She will use ice, Tylenol as needed.     Cheng Jean MD

## 2023-06-20 NOTE — NURSING NOTE
Chief Complaint   Patient presents with     RECHECK     Botox injections confirmed with patient

## 2023-09-12 ENCOUNTER — OFFICE VISIT (OUTPATIENT)
Dept: FAMILY MEDICINE | Facility: CLINIC | Age: 63
End: 2023-09-12
Payer: MEDICARE

## 2023-09-12 VITALS
HEIGHT: 60 IN | SYSTOLIC BLOOD PRESSURE: 128 MMHG | DIASTOLIC BLOOD PRESSURE: 84 MMHG | TEMPERATURE: 98.5 F | OXYGEN SATURATION: 93 % | RESPIRATION RATE: 14 BRPM | HEART RATE: 81 BPM | BODY MASS INDEX: 28.9 KG/M2

## 2023-09-12 DIAGNOSIS — K21.9 GASTROESOPHAGEAL REFLUX DISEASE WITHOUT ESOPHAGITIS: ICD-10-CM

## 2023-09-12 DIAGNOSIS — Z00.00 ENCOUNTER FOR MEDICARE ANNUAL WELLNESS EXAM: Primary | ICD-10-CM

## 2023-09-12 DIAGNOSIS — H52.03 HYPEROPIA, BILATERAL: ICD-10-CM

## 2023-09-12 DIAGNOSIS — R71.0 DECREASED HEMOGLOBIN: ICD-10-CM

## 2023-09-12 DIAGNOSIS — Z86.73 HISTORY OF CVA (CEREBROVASCULAR ACCIDENT): ICD-10-CM

## 2023-09-12 DIAGNOSIS — H25.13 NUCLEAR SCLEROSIS OF BOTH EYES: ICD-10-CM

## 2023-09-12 DIAGNOSIS — H52.223 REGULAR ASTIGMATISM OF BOTH EYES: ICD-10-CM

## 2023-09-12 DIAGNOSIS — I10 ESSENTIAL HYPERTENSION WITH GOAL BLOOD PRESSURE LESS THAN 140/90: ICD-10-CM

## 2023-09-12 DIAGNOSIS — Z23 NEED FOR VACCINATION: ICD-10-CM

## 2023-09-12 DIAGNOSIS — I69.920 APHASIA, LATE EFFECT OF CEREBROVASCULAR DISEASE: ICD-10-CM

## 2023-09-12 LAB
ERYTHROCYTE [DISTWIDTH] IN BLOOD BY AUTOMATED COUNT: 11.8 % (ref 10–15)
FOLATE SERPL-MCNC: 5.5 NG/ML (ref 4.6–34.8)
HCT VFR BLD AUTO: 36.1 % (ref 35–47)
HGB BLD-MCNC: 12 G/DL (ref 11.7–15.7)
MCH RBC QN AUTO: 32.9 PG (ref 26.5–33)
MCHC RBC AUTO-ENTMCNC: 33.2 G/DL (ref 31.5–36.5)
MCV RBC AUTO: 99 FL (ref 78–100)
PLATELET # BLD AUTO: 190 10E3/UL (ref 150–450)
RBC # BLD AUTO: 3.65 10E6/UL (ref 3.8–5.2)
WBC # BLD AUTO: 3.9 10E3/UL (ref 4–11)

## 2023-09-12 PROCEDURE — 84238 ASSAY NONENDOCRINE RECEPTOR: CPT | Mod: 90 | Performed by: FAMILY MEDICINE

## 2023-09-12 PROCEDURE — 99214 OFFICE O/P EST MOD 30 MIN: CPT | Mod: 25 | Performed by: FAMILY MEDICINE

## 2023-09-12 PROCEDURE — 83550 IRON BINDING TEST: CPT | Performed by: FAMILY MEDICINE

## 2023-09-12 PROCEDURE — 82607 VITAMIN B-12: CPT | Performed by: FAMILY MEDICINE

## 2023-09-12 PROCEDURE — 36415 COLL VENOUS BLD VENIPUNCTURE: CPT | Performed by: FAMILY MEDICINE

## 2023-09-12 PROCEDURE — 80061 LIPID PANEL: CPT | Performed by: FAMILY MEDICINE

## 2023-09-12 PROCEDURE — 82728 ASSAY OF FERRITIN: CPT | Performed by: FAMILY MEDICINE

## 2023-09-12 PROCEDURE — G0008 ADMIN INFLUENZA VIRUS VAC: HCPCS | Performed by: FAMILY MEDICINE

## 2023-09-12 PROCEDURE — 84450 TRANSFERASE (AST) (SGOT): CPT | Performed by: FAMILY MEDICINE

## 2023-09-12 PROCEDURE — 82746 ASSAY OF FOLIC ACID SERUM: CPT | Performed by: FAMILY MEDICINE

## 2023-09-12 PROCEDURE — 99000 SPECIMEN HANDLING OFFICE-LAB: CPT | Performed by: FAMILY MEDICINE

## 2023-09-12 PROCEDURE — 83540 ASSAY OF IRON: CPT | Performed by: FAMILY MEDICINE

## 2023-09-12 PROCEDURE — 90682 RIV4 VACC RECOMBINANT DNA IM: CPT | Performed by: FAMILY MEDICINE

## 2023-09-12 PROCEDURE — 85027 COMPLETE CBC AUTOMATED: CPT | Performed by: FAMILY MEDICINE

## 2023-09-12 PROCEDURE — 84132 ASSAY OF SERUM POTASSIUM: CPT | Performed by: FAMILY MEDICINE

## 2023-09-12 PROCEDURE — 84460 ALANINE AMINO (ALT) (SGPT): CPT | Performed by: FAMILY MEDICINE

## 2023-09-12 PROCEDURE — 82565 ASSAY OF CREATININE: CPT | Performed by: FAMILY MEDICINE

## 2023-09-12 PROCEDURE — G0439 PPPS, SUBSEQ VISIT: HCPCS | Performed by: FAMILY MEDICINE

## 2023-09-12 RX ORDER — SIMVASTATIN 20 MG
20 TABLET ORAL EVERY EVENING
Qty: 90 TABLET | Refills: 1 | Status: SHIPPED | OUTPATIENT
Start: 2023-09-12 | End: 2024-03-21

## 2023-09-12 RX ORDER — METOPROLOL SUCCINATE 50 MG/1
50 TABLET, EXTENDED RELEASE ORAL DAILY
Qty: 90 TABLET | Refills: 1 | Status: SHIPPED | OUTPATIENT
Start: 2023-09-12 | End: 2024-03-21

## 2023-09-12 RX ORDER — HYDROCHLOROTHIAZIDE 12.5 MG/1
12.5 TABLET ORAL DAILY PRN
Qty: 90 TABLET | Refills: 1 | Status: SHIPPED | OUTPATIENT
Start: 2023-09-12 | End: 2024-02-28

## 2023-09-12 RX ORDER — LOSARTAN POTASSIUM 50 MG/1
50 TABLET ORAL DAILY PRN
Qty: 90 TABLET | Refills: 1 | Status: SHIPPED | OUTPATIENT
Start: 2023-09-12 | End: 2024-03-21

## 2023-09-12 RX ORDER — PANTOPRAZOLE SODIUM 40 MG/1
40 TABLET, DELAYED RELEASE ORAL DAILY
Qty: 90 TABLET | Refills: 3 | Status: SHIPPED | OUTPATIENT
Start: 2023-09-12 | End: 2024-09-03

## 2023-09-12 ASSESSMENT — ENCOUNTER SYMPTOMS
NERVOUS/ANXIOUS: 0
BREAST MASS: 0
ARTHRALGIAS: 1
HEADACHES: 0
COUGH: 0
MYALGIAS: 0
PARESTHESIAS: 0
DIARRHEA: 0
DYSURIA: 0
SHORTNESS OF BREATH: 0
FREQUENCY: 0
DIZZINESS: 0
CHILLS: 0
HEMATURIA: 0
NAUSEA: 0
WEAKNESS: 0
JOINT SWELLING: 0
EYE PAIN: 0
HEARTBURN: 0
FEVER: 0
SORE THROAT: 0
PALPITATIONS: 0
CONSTIPATION: 0
HEMATOCHEZIA: 0
ABDOMINAL PAIN: 0

## 2023-09-12 ASSESSMENT — PATIENT HEALTH QUESTIONNAIRE - PHQ9
SUM OF ALL RESPONSES TO PHQ QUESTIONS 1-9: 0
10. IF YOU CHECKED OFF ANY PROBLEMS, HOW DIFFICULT HAVE THESE PROBLEMS MADE IT FOR YOU TO DO YOUR WORK, TAKE CARE OF THINGS AT HOME, OR GET ALONG WITH OTHER PEOPLE: NOT DIFFICULT AT ALL
SUM OF ALL RESPONSES TO PHQ QUESTIONS 1-9: 0

## 2023-09-12 ASSESSMENT — ACTIVITIES OF DAILY LIVING (ADL)
CURRENT_FUNCTION: TRANSPORTATION REQUIRES ASSISTANCE
CURRENT_FUNCTION: MEDICATION ADMINISTRATION REQUIRES ASSISTANCE
CURRENT_FUNCTION: MONEY MANAGEMENT REQUIRES ASSISTANCE
CURRENT_FUNCTION: BATHING REQUIRES ASSISTANCE
CURRENT_FUNCTION: LAUNDRY REQUIRES ASSISTANCE
CURRENT_FUNCTION: TELEPHONE REQUIRES ASSISTANCE
CURRENT_FUNCTION: PREPARING MEALS REQUIRES ASSISTANCE
CURRENT_FUNCTION: HOUSEWORK REQUIRES ASSISTANCE
CURRENT_FUNCTION: SHOPPING REQUIRES ASSISTANCE

## 2023-09-12 ASSESSMENT — PAIN SCALES - GENERAL: PAINLEVEL: NO PAIN (0)

## 2023-09-12 NOTE — PROGRESS NOTES
"SUBJECTIVE:   Anita is a 63 year old who presents for Preventive Visit.      9/12/2023    11:02 AM   Additional Questions   Roomed by Jocelyn   Accompanied by Spouse, who provides the history for the patient       Are you in the first 12 months of your Medicare coverage?  No vision due to no     Healthy Habits:     In general, how would you rate your overall health?  Fair    Frequency of exercise:  None    Do you usually eat at least 4 servings of fruit and vegetables a day, include whole grains    & fiber and avoid regularly eating high fat or \"junk\" foods?  Yes    Taking medications regularly:  Yes    Medication side effects:  None    Ability to successfully perform activities of daily living:  Telephone requires assistance, transportation requires assistance, shopping requires assistance, preparing meals requires assistance, housework requires assistance, bathing requires assistance, laundry requires assistance, medication administration requires assistance and money management requires assistance    Home Safety:  No safety concerns identified    Hearing Impairment:  No hearing concerns    In the past 6 months, have you been bothered by leaking of urine?  No    In general, how would you rate your overall mental or emotional health?  Fair    Additional concerns today:  No        Have you ever done Advance Care Planning? (For example, a Health Directive, POLST, or a discussion with a medical provider or your loved ones about your wishes): No, advance care planning information given to patient to review.  Advanced care planning was discussed at today's visit.       Fall risk  Fallen 2 or more times in the past year?: No  Any fall with injury in the past year?: No    Cognitive Screening Unable to complete due to not knowing how to draw a clock     Do you have sleep apnea, excessive snoring or daytime drowsiness? : no                     Social History     Tobacco Use    Smoking status: Never    Smokeless " tobacco: Never   Substance Use Topics    Alcohol use: Not Currently             9/12/2023    11:07 AM   Alcohol Use   Prescreen: >3 drinks/day or >7 drinks/week? No     Do you have a current opioid prescription? No  Do you use any other controlled substances or medications that are not prescribed by a provider? None    Hypertension Follow-up    Do you check your blood pressure regularly outside of the clinic? Yes   Are you following a low salt diet? Yes  Are your blood pressures ever more than 140 on the top number (systolic) OR more   than 90 on the bottom number (diastolic), for example 140/90? No, Patient's home BP readings have mostly been in the 120s systolic.       Current providers sharing in care for this patient include:   Patient Care Team:  Mulugeta Sharma MD as PCP - General (Family Practice)  James Jo OD as Assigned Surgical Provider  Mulugeta Sharma MD as Assigned PCP    The following health maintenance items are reviewed in Epic and correct as of today:  Health Maintenance   Topic Date Due    MEDICARE ANNUAL WELLNESS VISIT  09/20/2022    INFLUENZA VACCINE (1) 09/01/2023    ANNUAL REVIEW OF HM ORDERS  09/22/2023    EYE EXAM  09/26/2023    MAMMO SCREENING  11/08/2023    PHQ-9  03/12/2024    HPV TEST  03/25/2024    PAP  03/25/2024    COLORECTAL CANCER SCREENING  07/05/2025    ADVANCE CARE PLANNING  10/04/2026    LIPID  03/20/2028    DTAP/TDAP/TD IMMUNIZATION (3 - Td or Tdap) 09/20/2031    HEPATITIS C SCREENING  Completed    HIV SCREENING  Completed    DEPRESSION ACTION PLAN  Completed    ZOSTER IMMUNIZATION  Completed    COVID-19 Vaccine  Completed    Pneumococcal Vaccine: Pediatrics (0 to 5 Years) and At-Risk Patients (6 to 64 Years)  Aged Out    IPV IMMUNIZATION  Aged Out    HPV IMMUNIZATION  Aged Out    MENINGITIS IMMUNIZATION  Aged Out           9/20/2021     8:57 AM   Breast CA Risk Assessment (FHS-7)   Do you have a family history of breast, colon, or ovarian cancer? No / Unknown      Pertinent mammograms are reviewed under the imaging tab.    Review of Systems   Constitutional:  Negative for chills and fever.   HENT:  Negative for congestion, ear pain, hearing loss and sore throat.    Eyes:  Negative for pain and visual disturbance.   Respiratory:  Negative for cough and shortness of breath.    Cardiovascular:  Negative for chest pain, palpitations and peripheral edema.   Gastrointestinal:  Negative for abdominal pain, constipation, diarrhea, heartburn, hematochezia and nausea.   Breasts:  Negative for tenderness, breast mass and discharge.   Genitourinary:  Negative for dysuria, frequency, genital sores, hematuria, pelvic pain, urgency, vaginal bleeding and vaginal discharge.   Musculoskeletal:  Positive for arthralgias. Negative for joint swelling and myalgias.   Skin:  Negative for rash.   Neurological:  Negative for dizziness, weakness, headaches and paresthesias.   Psychiatric/Behavioral:  Negative for mood changes. The patient is not nervous/anxious.        OBJECTIVE:   /84 (BP Location: Left arm, Patient Position: Chair, Cuff Size: Adult Regular)   Pulse 81   Temp 98.5  F (36.9  C) (Oral)   Resp 14   Ht 1.524 m (5')   LMP  (LMP Unknown)   SpO2 93%   BMI 28.90 kg/m   Estimated body mass index is 28.9 kg/m  as calculated from the following:    Height as of this encounter: 1.524 m (5').    Weight as of 11/8/22: 67.1 kg (148 lb).  Physical Exam  GENERAL: healthy, alert and no distress  EYES: Eyes grossly normal to inspection, PERRL, EOMI, sclerae white and conjunctivae normal  RESP: lungs clear to auscultation - no crackles or wheezes, no areas of dullness, no tachypnea  CV: Heart regular rate and rhythm without murmur, click or rub. No peripheral edema and peripheral pulses strong  MS: no gross musculoskeletal defects noted, no edema  SKIN: no suspicious lesions or rashes to visible skin  NEURO: Normal strength and tone, sensory exam grossly normal, mentation intact,  oriented times 3 and cranial nerves 2-12 intact  PSYCH: mentation appears normal, affect normal/bright          ASSESSMENT / PLAN:   (Z00.00) Encounter for Medicare annual wellness exam  (primary encounter diagnosis)  Comment: Negative screening exam; up-to-date on preventive services.   Plan: INFLUENZA VACCINE 18-64Y (FLUBLOK)        follow up in 1 year    (I10) Essential hypertension with goal blood pressure less than 140/90  Comment: well controlled and the hypotension seems to have resolved.   Plan: Creatinine, Potassium, hydrochlorothiazide         (HYDRODIURIL) 12.5 MG tablet, losartan (COZAAR)        50 MG tablet, metoprolol succinate ER (TOPROL         XL) 50 MG 24 hr tablet        Return in about 6 months (around 3/12/2024) for cholesterol, blood pressure check.      (R79.89) Increase in creatinine  Comment: Noted in her labs at recent ED visit. This is presumed to be prerenal from dehydration, so I want to be sure it has resolved.   Plan: Creatinine        follow up as needed.     (R71.0) Decreased hemoglobin  Comment: Her baseline hemoglobin is a low normal, but it was down to 11.4 in the ED.   Plan: Vitamin B12, Folate, Iron & Iron Binding         Capacity, Ferritin, Soluble transferrin         receptor, CBC with platelets        follow up as needed.     (Z86.73) History of CVA (cerebrovascular accident)  (I69.920) Aphasia, late effect of cerebrovascular disease  Comment: As usual, we will continue to treat this  by minimizing her other CV risk factors. She continues to take daily aspirin.   Plan: simvastatin (ZOCOR) 20 MG tablet        Return in about 6 months (around 3/12/2024) for cholesterol, blood pressure check.      (K21.9) Gastroesophageal reflux disease without esophagitis  Comment: prescription update   Plan: pantoprazole (PROTONIX) 40 MG EC tablet            (H25.13) Nuclear sclerosis of both eyes  (H52.03) Hyperopia, bilateral  (H52.223) Regular astigmatism of both eyes  Comment:   Plan:  Adult Eye  Referral            (Z23) Need for vaccination  Comment:   Plan: INFLUENZA VACCINE 18-64Y (FLUBLOK)               COUNSELING:  Reviewed preventive health counseling, as reflected in patient instructions  Special attention given to:       Regular exercise       Vision screening       Immunizations  Vaccinated for: Influenza           Advanced Planning       BMI:   Estimated body mass index is 28.9 kg/m  as calculated from the following:    Height as of this encounter: 1.524 m (5').    Weight as of 11/8/22: 67.1 kg (148 lb).       She reports that she has never smoked. She has never used smokeless tobacco.      Appropriate preventive services were discussed with this patient, including applicable screening as appropriate for cardiovascular disease, diabetes, osteopenia/osteoporosis, and glaucoma.  As appropriate for age/gender, discussed screening for colorectal cancer, prostate cancer, breast cancer, and cervical cancer. Checklist reviewing preventive services available has been given to the patient.    Reviewed patients plan of care and provided an AVS. The Basic Care Plan (routine screening as documented in Health Maintenance) for Anita meets the Care Plan requirement. This Care Plan has been established and reviewed with the Patient.          Mulugeta Sharma MD  [facility]    This document serves as a record of the services and decisions personally performed and made by Dr. Sharma. It was created on his behalf by Tori Dc, a trained medical scribe. The creation of this document is based the provider's statements to the medical scribe.  Tori Dc,  12:04 PM

## 2023-09-12 NOTE — PATIENT INSTRUCTIONS
Patient Education   Personalized Prevention Plan  You are due for the preventive services outlined below.  Your care team is available to assist you in scheduling these services.  If you have already completed any of these items, please share that information with your care team to update in your medical record.  Health Maintenance Due   Topic Date Due    Annual Wellness Visit  09/20/2022    Flu Vaccine (1) 09/01/2023    ANNUAL REVIEW OF HM ORDERS  09/22/2023    Eye Exam  09/26/2023     Your Health Risk Assessment indicates you feel you are not in good health    A healthy lifestyle helps keep the body fit and the mind alert. It helps protect you from disease, helps you fight disease, and helps prevent chronic disease (disease that doesn't go away) from getting worse. This is important as you get older and begin to notice twinges in muscles and joints and a decline in the strength and stamina you once took for granted. A healthy lifestyle includes good healthcare, good nutrition, weight control, recreation, and regular exercise. Avoid harmful substances and do what you can to keep safe. Another part of a healthy lifestyle is stay mentally active and socially involved.    Good healthcare   Have a wellness visit every year.   If you have new symptoms, let us know right away. Don't wait until the next checkup.   Take medicines exactly as prescribed and keep your medicines in a safe place. Tell us if your medicine causes problems.   Healthy diet and weight control   Eat 3 or 4 small, nutritious, low-fat, high-fiber meals a day. Include a variety of fruits, vegetables, and whole-grain foods.   Make sure you get enough calcium in your diet. Calcium, vitamin D, and exercise help prevent osteoporosis (bone thinning).   If you live alone, try eating with others when you can. That way you get a good meal and have company while you eat it.   Try to keep a healthy weight. If you eat more calories than your body uses for  "energy, it will be stored as fat and you will gain weight.     Recreation   Recreation is not limited to sports and team events. It includes any activity that provides relaxation, interest, enjoyment, and exercise. Recreation provides an outlet for physical, mental, and social energy. It can give a sense of worth and achievement. It can help you stay healthy.    Mental Exercise and Social Involvement  Mental and emotional health is as important as physical health. Keep in touch with friends and family. Stay as active as possible. Continue to learn and challenge yourself.   Things you can do to stay mentally active are:  Learn something new, like a foreign language or musical instrument.   Play SCRABBLE or do crossword puzzles. If you cannot find people to play these games with you at home, you can play them with others on your computer through the Internet.   Join a games club--anything from card games to chess or checkers or lawn bowling.   Start a new hobby.   Go back to school.   Volunteer.   Read.   Keep up with world events.  Learning About Being Physically Active  What is physical activity?     Being physically active means doing any kind of activity that gets your body moving.  The types of physical activity that can help you get fit and stay healthy include:  Aerobic or \"cardio\" activities. These make your heart beat faster and make you breathe harder, such as brisk walking, riding a bike, or running. They strengthen your heart and lungs and build up your endurance.  Strength training activities. These make your muscles work against, or \"resist,\" something. Examples include lifting weights or doing push-ups. These activities help tone and strengthen your muscles and bones.  Stretches. These let you move your joints and muscles through their full range of motion. Stretching helps you be more flexible.  Reaching a balance between these three types of physical activity is important because each one contributes " "to your overall fitness.  What are the benefits of being active?  Being active is one of the best things you can do for your health. It helps you to:  Feel stronger and have more energy to do all the things you like to do.  Focus better at school or work.  Feel, think, and sleep better.  Reach and stay at a healthy weight.  Lose fat and build lean muscle.  Lower your risk for serious health problems, including diabetes, heart attack, high blood pressure, and some cancers.  Keep your heart, lungs, bones, muscles, and joints strong and healthy.  How can you make being active part of your life?  Start slowly. Make it your long-term goal to get at least 30 minutes of exercise on most days of the week. Walking is a good choice. You also may want to do other activities, such as running, swimming, cycling, or playing tennis or team sports.  Pick activities that you like--ones that make your heart beat faster, your muscles stronger, and your muscles and joints more flexible. If you find more than one thing you like doing, do them all. You don't have to do the same thing every day.  Get your heart pumping every day. Any activity that makes your heart beat faster and keeps it at that rate for a while counts.  Here are some great ways to get your heart beating faster:  Go for a brisk walk, run, or bike ride.  Go for a hike or swim.  Go in-line skating.  Play a game of touch football, basketball, or soccer.  Ride a bike.  Play tennis or racquetball.  Climb stairs.  Even some household chores can be aerobic--just do them at a faster pace. Vacuuming, raking or mowing the lawn, sweeping the garage, and washing and waxing the car all can help get your heart rate up.  Strengthen your muscles during the week. You don't have to lift heavy weights or grow big, bulky muscles to get stronger. Doing a few simple activities that make your muscles work against, or \"resist,\" something can help you get stronger.  For example, you can:  Do " "push-ups or sit-ups, which use your own body weight as resistance.  Lift weights or dumbbells or use stretch bands at home or in a gym or community center.  Stretch your muscles often. Stretching will help you as you become more active. It can help you stay flexible, loosen tight muscles, and avoid injury. It can also help improve your balance and posture and can be a great way to relax.  Be sure to stretch the muscles you'll be using when you work out. It's best to warm your muscles slightly before you stretch them. Walk or do some other light aerobic activity for a few minutes, and then start stretching.  When you stretch your muscles:  Do it slowly. Stretching is not about going fast or making sudden movements.  Don't push or bounce during a stretch.  Hold each stretch for at least 15 to 30 seconds, if you can. You should feel a stretch in the muscle, but not pain.  Breathe out as you do the stretch. Then breathe in as you hold the stretch. Don't hold your breath.  If you're worried about how more activity might affect your health, have a checkup before you start. Follow any special advice your doctor gives you for getting a smart start.  Where can you learn more?  Go to https://www.[employer].net/patiented  Enter W332 in the search box to learn more about \"Learning About Being Physically Active.\"  Current as of: October 10, 2022               Content Version: 13.7    4638-9800 [employer].   Care instructions adapted under license by your healthcare professional. If you have questions about a medical condition or this instruction, always ask your healthcare professional. [employer] disclaims any warranty or liability for your use of this information.      Activities of Daily Living    Your Health Risk Assessment indicates you have difficulties with activities of daily living such as housework, bathing, preparing meals, taking medication, etc. Please make a follow up appointment for us " to address this issue in more detail.  Your Health Risk Assessment indicates you feel you are not in good emotional health.    Recreation   Recreation is not limited to sports and team events. It includes any activity that provides relaxation, interest, enjoyment, and exercise. Recreation provides an outlet for physical, mental, and social energy. It can give a sense of worth and achievement. It can help you stay healthy.    Mental Exercise and Social Involvement  Mental and emotional health is as important as physical health. Keep in touch with friends and family. Stay as active as possible. Continue to learn and challenge yourself.   Things you can do to stay mentally active are:  Learn something new, like a foreign language or musical instrument.   Play SCRABBLE or do crossword puzzles. If you cannot find people to play these games with you at home, you can play them with others on your computer through the Internet.   Join a games club--anything from card games to chess or checkers or lawn bowling.   Start a new hobby.   Go back to school.   Volunteer.   Read.   Keep up with world events.

## 2023-09-13 LAB
ALT SERPL W P-5'-P-CCNC: 16 U/L (ref 0–50)
AST SERPL W P-5'-P-CCNC: 21 U/L (ref 0–45)
CHOLEST SERPL-MCNC: 154 MG/DL
CREAT SERPL-MCNC: 1.11 MG/DL (ref 0.51–0.95)
EGFRCR SERPLBLD CKD-EPI 2021: 56 ML/MIN/1.73M2
FERRITIN SERPL-MCNC: 29 NG/ML (ref 11–328)
HDLC SERPL-MCNC: 35 MG/DL
IRON BINDING CAPACITY (ROCHE): 306 UG/DL (ref 240–430)
IRON SATN MFR SERPL: 22 % (ref 15–46)
IRON SERPL-MCNC: 68 UG/DL (ref 37–145)
LDLC SERPL CALC-MCNC: 72 MG/DL
NONHDLC SERPL-MCNC: 119 MG/DL
POTASSIUM SERPL-SCNC: 4.3 MMOL/L (ref 3.4–5.3)
TRIGL SERPL-MCNC: 234 MG/DL
VIT B12 SERPL-MCNC: 353 PG/ML (ref 232–1245)

## 2023-09-14 LAB — STFR SERPL-MCNC: 3.5 MG/L

## 2023-09-19 ENCOUNTER — OFFICE VISIT (OUTPATIENT)
Dept: PHYSICAL MEDICINE AND REHAB | Facility: CLINIC | Age: 63
End: 2023-09-19
Payer: MEDICARE

## 2023-09-19 VITALS — OXYGEN SATURATION: 93 % | HEART RATE: 76 BPM | SYSTOLIC BLOOD PRESSURE: 133 MMHG | DIASTOLIC BLOOD PRESSURE: 75 MMHG

## 2023-09-19 DIAGNOSIS — G81.11 RIGHT SPASTIC HEMIPARESIS (H): Primary | ICD-10-CM

## 2023-09-19 DIAGNOSIS — G24.8 FOCAL DYSTONIA: ICD-10-CM

## 2023-09-19 DIAGNOSIS — M62.838 SPASM OF MUSCLE: ICD-10-CM

## 2023-09-19 DIAGNOSIS — R26.9 ABNORMAL GAIT: ICD-10-CM

## 2023-09-19 PROCEDURE — 95873 GUIDE NERV DESTR ELEC STIM: CPT | Performed by: PHYSICAL MEDICINE & REHABILITATION

## 2023-09-19 PROCEDURE — 64643 CHEMODENERV 1 EXTREM 1-4 EA: CPT | Performed by: PHYSICAL MEDICINE & REHABILITATION

## 2023-09-19 PROCEDURE — 64644 CHEMODENERV 1 EXTREM 5/> MUS: CPT | Performed by: PHYSICAL MEDICINE & REHABILITATION

## 2023-09-19 NOTE — LETTER
9/19/2023       RE: Anita Bennett  [address]  [place] [address]     Dear Colleague,    Thank you for referring your patient, Anita Bennett, to the [facility] PHYSICAL MEDICINE AND REHABILITATION CLINIC [place] at [facility]. Please see a copy of my visit note below.    The patient returns for a follow-up visit for her ongoing issues related to spastic hemiparesis affecting the right side.     She has been  seen by me previously at [facility].      Last seen here on 6/20/23 Botox done helped.     She has been complaining of pain in the left side of the neck and the lower back.  She has previously responded nicely to injection therapy with medial branch blocks to the lumbar and cervical region.  Both diagnostic and confirmatory blocks were done with good relief.  This was done in July and August 2019.  She has done physical therapy without relief and continues to do home exercise program without relief.  She has undergone diagnostic medial branch blocks for the left cervical and lumbar spine.  Due to aphasia, her pain diary is difficult.  Her spouse tells me that she was happy, moving better and not complaining of her usual pain for the duration of the anesthetic.  This was about 3 hours for the neck and more than 6 hours for the lower back.  Her pain has since returned.  Due to the number of years since the last 1 was done, I will repeat an MRI of the cervical spine looking for any other causes for her pain.  If none is found, I will repeat the medial branch blocks from C2-C5 including the third occipital nerves on the left side and do diagnostic and confirmatory test and if she gets good relief from this, she would be a candidate for radiofrequency ablations.  Due to her significant spasticity and stroke, she is not a candidate for the conventional physical therapy.  This may have to be done if insurance insists.  In the past she  has had good relief with just the medial branch blocks and did not even proceed to radiofrequency ablations.        She reports her function is better and she is more independent now.  She has history of sadness and is doing quite well currently and  is tolerating her antidepressant.        Past Medical History           Past Medical History:   Diagnosis Date    Acute ischemic left MCA stroke (H) 3/22/15    Essential hypertension, benign           Past Surgical History             Past Surgical History:   Procedure Laterality Date    NO HISTORY OF SURGERY             Current Outpatient Prescriptions               Current Outpatient Medications   Medication Sig Dispense Refill    aspirin (ASA) 325 MG tablet TAKE 1 TABLET BY MOUTH EVERY DAY 90 tablet 1    azelastine (OPTIVAR) 0.05 % ophthalmic solution Place 1 drop into both eyes 2 times daily as needed (for itchy water eyes) 5 mL 11    baclofen (LIORESAL) 20 MG tablet Take 1 tablet (20 mg) by mouth 3 times daily 270 tablet 3    escitalopram (LEXAPRO) 10 MG tablet Take 1 tablet (10 mg) by mouth daily 30 tablet 11    gabapentin (NEURONTIN) 400 MG capsule Take 1 capsule (400 mg) by mouth 3 times daily for arm mobility. 90 capsule 11    hydrochlorothiazide (HYDRODIURIL) 12.5 MG tablet Take 1 tablet (12.5 mg) by mouth daily as needed for blood pressure. 90 tablet 1    losartan (COZAAR) 50 MG tablet Take 1 tablet (50 mg) by mouth daily as needed for blood pressure. 90 tablet 1    metoprolol succinate ER (TOPROL-XL) 50 MG 24 hr tablet Take 1 tablet (50 mg) by mouth daily for blood pressure. 90 tablet 1    nabumetone (RELAFEN) 500 MG tablet TAKE 1-2 TABLETS (500-1,000 MG) BY MOUTH 2 TIMES DAILY AS NEEDED FOR PAIN IN ARM OR HIP WITH FOOD 60 tablet 1    pantoprazole (PROTONIX) 40 MG EC tablet Take 1 tablet (40 mg) by mouth daily for reflux. 90 tablet 3    SENEXON-S 8.6-50 MG tablet TAKE 1 TABLET BY MOUTH DAILY AS NEEDED FOR CONSTIPATION 30 tablet 4    simvastatin (ZOCOR) 20 MG  tablet Take 1 tablet (20 mg) by mouth every evening for cholesterol. 90 tablet 1    triamcinolone (KENALOG) 0.1 % cream Apply sparingly to affected area three times daily as needed 80 g 0    VITAMIN D3 25 MCG (1000 UT) tablet TAKE 1 TABLET BY MOUTH EVERY DAY 30 tablet 10           /75   Pulse 76   LMP  (LMP Unknown)   SpO2 93%       On examination, the patient is in her manual wheelchair.  She transfers with assist of her .  She has involvement of right levator scapula, right pectoralis major, right biceps brachii, flexor carpi ulnaris and flexor carpi radialis, flexor digitorum superficialis and flexor digitorum profundus.  She has increased tone in the flexor pollicis longus and  lumbricals. In the lower extremity . EHL. FDL and gastrocnemius are tight.  She is also tender over the left side of the neck where she is complaining of pain currently including the occipital region.  The right side is nontender.  The low back is nontender.     Impression: Right spastic hemiparesis, torticollis, gait abnormality and spasm of muscles due to cerebrovascular accident.  Cervical spondylosis on the left side.     Based on today's assessment, she would benefit from 600 units of Botox injections.  I will see her in follow-up in about a month's time to see how she is responding and plan future treatments every 12 weeks.     20 minutes for the evaluation and management portion of the visit, greater than 50% was for CC.     Procedure note: With her informed consent, after explaining the benefits and risks of the procedure, and using Betasept for skin prep, EMG for localization, preservative-free normal saline for dilution, 600 units of Botox, 100 units were injected into the right pectoralis major,  25 needs to right flexor pollicis longus, 50 unis for injected into the following muscles lumbricals, 50 units into flexor carpi radialis, 50 units into flexor digitorum Superficialis, flexor digitorum profundus.  75  units each were injected into the biceps brachii,  50 units into FDL, EHL and 100 units into the gastrocnemius.. 600 units were utilized in all.  She tolerated it well.  She will use ice, Tylenol as needed.       Again, thank you for allowing me to participate in the care of your patient.      Sincerely,    Cheng Jean MD

## 2023-09-19 NOTE — PROGRESS NOTES
The patient returns for a follow-up visit for her ongoing issues related to spastic hemiparesis affecting the right side.     She has been  seen by me previously at [facility] stroke [place].      Last seen here on 6/20/23 Botox done helped.     She has been complaining of pain in the left side of the neck and the lower back.  She has previously responded nicely to injection therapy with medial branch blocks to the lumbar and cervical region.  Both diagnostic and confirmatory blocks were done with good relief.  This was done in July and August 2019.  She has done physical therapy without relief and continues to do home exercise program without relief.  She has undergone diagnostic medial branch blocks for the left cervical and lumbar spine.  Due to aphasia, her pain diary is difficult.  Her spouse tells me that she was happy, moving better and not complaining of her usual pain for the duration of the anesthetic.  This was about 3 hours for the neck and more than 6 hours for the lower back.  Her pain has since returned.  Due to the number of years since the last 1 was done, I will repeat an MRI of the cervical spine looking for any other causes for her pain.  If none is found, I will repeat the medial branch blocks from C2-C5 including the third occipital nerves on the left side and do diagnostic and confirmatory test and if she gets good relief from this, she would be a candidate for radiofrequency ablations.  Due to her significant spasticity and stroke, she is not a candidate for the conventional physical therapy.  This may have to be done if insurance insists.  In the past she has had good relief with just the medial branch blocks and did not even proceed to radiofrequency ablations.        She reports her function is better and she is more independent now.  She has history of sadness and is doing quite well currently and  is tolerating her antidepressant.        Past Medical History           Past Medical  History:   Diagnosis Date    Acute ischemic left MCA stroke (H) 3/22/15    Essential hypertension, benign           Past Surgical History             Past Surgical History:   Procedure Laterality Date    NO HISTORY OF SURGERY             Current Outpatient Prescriptions               Current Outpatient Medications   Medication Sig Dispense Refill    aspirin (ASA) 325 MG tablet TAKE 1 TABLET BY MOUTH EVERY DAY 90 tablet 1    azelastine (OPTIVAR) 0.05 % ophthalmic solution Place 1 drop into both eyes 2 times daily as needed (for itchy water eyes) 5 mL 11    baclofen (LIORESAL) 20 MG tablet Take 1 tablet (20 mg) by mouth 3 times daily 270 tablet 3    escitalopram (LEXAPRO) 10 MG tablet Take 1 tablet (10 mg) by mouth daily 30 tablet 11    gabapentin (NEURONTIN) 400 MG capsule Take 1 capsule (400 mg) by mouth 3 times daily for arm mobility. 90 capsule 11    hydrochlorothiazide (HYDRODIURIL) 12.5 MG tablet Take 1 tablet (12.5 mg) by mouth daily as needed for blood pressure. 90 tablet 1    losartan (COZAAR) 50 MG tablet Take 1 tablet (50 mg) by mouth daily as needed for blood pressure. 90 tablet 1    metoprolol succinate ER (TOPROL-XL) 50 MG 24 hr tablet Take 1 tablet (50 mg) by mouth daily for blood pressure. 90 tablet 1    nabumetone (RELAFEN) 500 MG tablet TAKE 1-2 TABLETS (500-1,000 MG) BY MOUTH 2 TIMES DAILY AS NEEDED FOR PAIN IN ARM OR HIP WITH FOOD 60 tablet 1    pantoprazole (PROTONIX) 40 MG EC tablet Take 1 tablet (40 mg) by mouth daily for reflux. 90 tablet 3    SENEXON-S 8.6-50 MG tablet TAKE 1 TABLET BY MOUTH DAILY AS NEEDED FOR CONSTIPATION 30 tablet 4    simvastatin (ZOCOR) 20 MG tablet Take 1 tablet (20 mg) by mouth every evening for cholesterol. 90 tablet 1    triamcinolone (KENALOG) 0.1 % cream Apply sparingly to affected area three times daily as needed 80 g 0    VITAMIN D3 25 MCG (1000 UT) tablet TAKE 1 TABLET BY MOUTH EVERY DAY 30 tablet 10           /75   Pulse 76   LMP  (LMP Unknown)   SpO2  93%       On examination, the patient is in her manual wheelchair.  She transfers with assist of her .  She has involvement of right levator scapula, right pectoralis major, right biceps brachii, flexor carpi ulnaris and flexor carpi radialis, flexor digitorum superficialis and flexor digitorum profundus.  She has increased tone in the flexor pollicis longus and  lumbricals. In the lower extremity . EHL. FDL and gastrocnemius are tight.  She is also tender over the left side of the neck where she is complaining of pain currently including the occipital region.  The right side is nontender.  The low back is nontender.     Impression: Right spastic hemiparesis, torticollis, gait abnormality and spasm of muscles due to cerebrovascular accident.  Cervical spondylosis on the left side.     Based on today's assessment, she would benefit from 600 units of Botox injections.  I will see her in follow-up in about a month's time to see how she is responding and plan future treatments every 12 weeks.     20 minutes for the evaluation and management portion of the visit, greater than 50% was for CC.     Procedure note: With her informed consent, after explaining the benefits and risks of the procedure, and using Betasept for skin prep, EMG for localization, preservative-free normal saline for dilution, 600 units of Botox, 100 units were injected into the right pectoralis major,  25 needs to right flexor pollicis longus, 50 unis for injected into the following muscles lumbricals, 50 units into flexor carpi radialis, 50 units into flexor digitorum Superficialis, flexor digitorum profundus.  75 units each were injected into the biceps brachii,  50 units into FDL, EHL and 100 units into the gastrocnemius.. 600 units were utilized in all.  She tolerated it well.  She will use ice, Tylenol as needed.     Cheng Jean MD

## 2023-09-19 NOTE — NURSING NOTE
Chief Complaint   Patient presents with    RECHECK     Botox injections confirmed with patient   /75   Pulse 76   LMP  (LMP Unknown)   SpO2 93%       Cuca Zarate CMA at 12:36 PM on 9/19/2023.

## 2023-10-09 ENCOUNTER — ANCILLARY ORDERS (OUTPATIENT)
Dept: FAMILY MEDICINE | Facility: CLINIC | Age: 63
End: 2023-10-09

## 2023-10-09 ENCOUNTER — PATIENT OUTREACH (OUTPATIENT)
Dept: CARE COORDINATION | Facility: CLINIC | Age: 63
End: 2023-10-09
Payer: MEDICARE

## 2023-10-09 DIAGNOSIS — R92.0 MAMMOGRAPHIC MICROCALCIFICATION: Primary | ICD-10-CM

## 2023-10-09 DIAGNOSIS — Z12.31 ENCOUNTER FOR SCREENING MAMMOGRAM FOR BREAST CANCER: ICD-10-CM

## 2023-10-12 ENCOUNTER — OFFICE VISIT (OUTPATIENT)
Dept: OPTOMETRY | Facility: CLINIC | Age: 63
End: 2023-10-12
Attending: FAMILY MEDICINE
Payer: MEDICARE

## 2023-10-12 DIAGNOSIS — H52.03 HYPEROPIA, BILATERAL: ICD-10-CM

## 2023-10-12 DIAGNOSIS — H52.223 REGULAR ASTIGMATISM OF BOTH EYES: ICD-10-CM

## 2023-10-12 DIAGNOSIS — H25.13 NUCLEAR SCLEROSIS OF BOTH EYES: Primary | ICD-10-CM

## 2023-10-12 DIAGNOSIS — Z86.73 HISTORY OF STROKE: ICD-10-CM

## 2023-10-12 PROCEDURE — 92015 DETERMINE REFRACTIVE STATE: CPT | Mod: GY | Performed by: OPTOMETRIST

## 2023-10-12 PROCEDURE — 99214 OFFICE O/P EST MOD 30 MIN: CPT | Performed by: OPTOMETRIST

## 2023-10-12 ASSESSMENT — REFRACTION_WEARINGRX
OS_AXIS: 126
OS_SPHERE: +1.25
OS_CYLINDER: +0.50
OD_CYLINDER: +0.50
OD_SPHERE: +2.00
OD_AXIS: 030
OS_ADD: +2.50
OD_ADD: +2.50
SPECS_TYPE: BIFOCAL

## 2023-10-12 ASSESSMENT — VISUAL ACUITY
OD_CC: 20/70
OS_PH_CC: 20/60
METHOD: SNELLEN - LINEAR
OD_CC: 20/70
OS_CC: 20/100
OS_CC: 20/70
CORRECTION_TYPE: GLASSES
OD_PH_CC: 20/70

## 2023-10-12 ASSESSMENT — TONOMETRY: IOP_METHOD: BOTH EYES NORMAL BY PALPATION

## 2023-10-12 ASSESSMENT — REFRACTION_MANIFEST
OD_SPHERE: +2.50
OS_CYLINDER: +0.75
OD_AXIS: 030
OD_ADD: +2.75
OS_ADD: +2.75
OD_CYLINDER: +0.75
OS_SPHERE: +2.00
OS_AXIS: 125

## 2023-10-12 ASSESSMENT — EXTERNAL EXAM - LEFT EYE: OS_EXAM: NORMAL

## 2023-10-12 ASSESSMENT — CUP TO DISC RATIO
OS_RATIO: 0.4
OD_RATIO: 0.45

## 2023-10-12 ASSESSMENT — EXTERNAL EXAM - RIGHT EYE: OD_EXAM: NORMAL

## 2023-10-12 NOTE — PROGRESS NOTES
Chief Complaint   Patient presents with    Annual Eye Exam      Accompanied by    Last Eye Exam: 2022  Dilated Previously: Yes    What are you currently using to see?  glasses       Distance Vision Acuity: Noticed gradual change in both eyes    Near Vision Acuity: Satisfied with vision while reading and using computer with glasses    Eye Comfort: good  Do you use eye drops? : Yes: Tio Brizuela - Optometric Assistant          Medical, surgical and family histories reviewed and updated 10/12/2023.       OBJECTIVE: See Ophthalmology exam    ASSESSMENT:    ICD-10-CM    1. Nuclear sclerosis of both eyes  H25.13 Adult Eye  Referral      2. Hyperopia, bilateral  H52.03 Adult Eye  Referral      3. Regular astigmatism of both eyes  H52.223 Adult Eye  Referral          PLAN:     There are no Patient Instructions on file for this visit.

## 2023-10-12 NOTE — LETTER
10/12/2023         RE: Anita Bennett  [address]  [place] [address]        Dear Colleague,    Thank you for referring your patient, Anita Bennett, to the [facility]. Please see a copy of my visit note below.    Chief Complaint   Patient presents with     Annual Eye Exam      Accompanied by    Last Eye Exam: 2022  Dilated Previously: Yes    What are you currently using to see?  glasses       Distance Vision Acuity: Noticed gradual change in both eyes    Near Vision Acuity: Satisfied with vision while reading and using computer with glasses    Eye Comfort: good  Do you use eye drops? : Yes: Tio Brizuela - Optometric Assistant          Medical, surgical and family histories reviewed and updated 10/12/2023.       OBJECTIVE: See Ophthalmology exam    ASSESSMENT:    ICD-10-CM    1. Nuclear sclerosis of both eyes  H25.13 Adult Eye  Referral      2. Hyperopia, bilateral  H52.03 Adult Eye  Referral      3. Regular astigmatism of both eyes  H52.223 Adult Eye  Referral          PLAN:     There are no Patient Instructions on file for this visit.       Again, thank you for allowing me to participate in the care of your patient.        Sincerely,        Silva Zarate, OD

## 2023-10-12 NOTE — PATIENT INSTRUCTIONS
You have cataracts.  You may notice some blurred vision or glare with night driving.  It is important that you wear good sunglasses to protect your eyes from the ultraviolet light from the sun.  I recommend that you return in 1 year for an eye exam at [place] unless there are any sudden changes in your vision.       Eyeglass prescription given.  Recommended to hold until after the cataract consult    The affects of the dilating drops last for 4- 6 hours.  You will be more sensitive to light and vision will be blurry up close.  Do not drive if you do not feel comfortable.  Mydriatic sunglasses were given if needed.    SEE OPHTHALMOLOGY ONLY    Silva Zarate O.D.  [address] [address] [phone]

## 2023-10-21 DIAGNOSIS — Z53.9 DIAGNOSIS NOT YET DEFINED: ICD-10-CM

## 2023-10-23 RX ORDER — DOCUSATE SODIUM 50MG AND SENNOSIDES 8.6MG 8.6; 5 MG/1; MG/1
TABLET, FILM COATED ORAL
Qty: 30 TABLET | Refills: 1 | Status: SHIPPED | OUTPATIENT
Start: 2023-10-23

## 2023-10-27 DIAGNOSIS — Z86.73 HISTORY OF CVA (CEREBROVASCULAR ACCIDENT): ICD-10-CM

## 2023-10-27 DIAGNOSIS — I69.920 APHASIA, LATE EFFECT OF CEREBROVASCULAR DISEASE: ICD-10-CM

## 2023-10-27 RX ORDER — ASPIRIN 325 MG
325 TABLET ORAL DAILY
Qty: 90 TABLET | Refills: 3 | Status: SHIPPED | OUTPATIENT
Start: 2023-10-27

## 2023-11-02 ENCOUNTER — OFFICE VISIT (OUTPATIENT)
Dept: OPHTHALMOLOGY | Facility: CLINIC | Age: 63
End: 2023-11-02
Attending: OPTOMETRIST
Payer: MEDICARE

## 2023-11-02 DIAGNOSIS — H25.13 NUCLEAR SCLEROSIS OF BOTH EYES: ICD-10-CM

## 2023-11-02 DIAGNOSIS — Z86.73 HISTORY OF STROKE: ICD-10-CM

## 2023-11-02 DIAGNOSIS — H52.03 HYPEROPIA, BILATERAL: ICD-10-CM

## 2023-11-02 DIAGNOSIS — H52.223 REGULAR ASTIGMATISM OF BOTH EYES: ICD-10-CM

## 2023-11-02 PROCEDURE — 92004 COMPRE OPH EXAM NEW PT 1/>: CPT | Performed by: STUDENT IN AN ORGANIZED HEALTH CARE EDUCATION/TRAINING PROGRAM

## 2023-11-02 ASSESSMENT — VISUAL ACUITY
OD_CC: 20/80
CORRECTION_TYPE: GLASSES
METHOD: TUMBLING E 'S
OS_CC+: -1
OS_CC: 20/50

## 2023-11-02 ASSESSMENT — REFRACTION_MANIFEST
OD_AXIS: 030
OD_SPHERE: +2.00
OS_CYLINDER: +0.75
OS_SPHERE: +0.50
OD_CYLINDER: +1.00
OS_AXIS: 150

## 2023-11-02 ASSESSMENT — REFRACTION_WEARINGRX
OD_ADD: +2.50
OD_SPHERE: +1.00
OD_CYLINDER: +0.50
OS_ADD: +2.50
OS_AXIS: 005
OS_SPHERE: +0.50
OS_CYLINDER: +0.50
OD_AXIS: 032
SPECS_TYPE: BIFOCAL

## 2023-11-02 ASSESSMENT — EXTERNAL EXAM - LEFT EYE: OS_EXAM: NORMAL

## 2023-11-02 ASSESSMENT — TONOMETRY
OS_IOP_MMHG: 13
IOP_METHOD: APPLANATION
OD_IOP_MMHG: 12

## 2023-11-02 ASSESSMENT — CUP TO DISC RATIO
OS_RATIO: 0.4
OD_RATIO: 0.45

## 2023-11-02 ASSESSMENT — CONF VISUAL FIELD
OD_INFERIOR_NASAL_RESTRICTION: 3
OS_INFERIOR_TEMPORAL_RESTRICTION: 3
OS_INFERIOR_NASAL_RESTRICTION: 3

## 2023-11-02 ASSESSMENT — EXTERNAL EXAM - RIGHT EYE: OD_EXAM: NORMAL

## 2023-11-02 NOTE — LETTER
11/2/2023         RE: Anita Bennett  [address]  [place] [address]        Dear Colleague,    Thank you for referring your patient, Anita Bennett, to the [facility]. Please see a copy of my visit note below.     Current Eye Medications:  Patanol daily both eyes      Subjective:  referral from Dr. Silva Zarate for cataract eval. Feels the right eye is worse than the left eye and has been for a long time. No pain in the eyes. Stroke nine years affected right side     Objective:  See Ophthalmology Exam.       Assessment:  Anita Bennett is a 63 year old female who presents with:   Encounter Diagnoses   Name Primary?     Nuclear sclerosis of both eyes - Both Eyes Early visually significance. Monitor. Update glasses for now.     Hyperopia, bilateral - Both Eyes      Regular astigmatism of both eyes - Both Eyes      History of stroke        Plan:  Glasses prescription given - recommend updating     Continue Patanol once daily as needed     Usha Lorenzo MD  [phone]        Again, thank you for allowing me to participate in the care of your patient.        Sincerely,        Usha Lorenzo MD

## 2023-11-02 NOTE — PATIENT INSTRUCTIONS
Glasses prescription given - recommend updating     Continue Patanol once daily as needed     Usha Lorenzo MD  [phone]

## 2023-11-02 NOTE — PROGRESS NOTES
Current Eye Medications:  Patanol daily both eyes      Subjective:  referral from Dr. Silva Zarate for cataract eval. Feels the right eye is worse than the left eye and has been for a long time. No pain in the eyes. Stroke nine years affected right side     Objective:  See Ophthalmology Exam.       Assessment:  Anita Bennett is a 63 year old female who presents with:   Encounter Diagnoses   Name Primary?    Nuclear sclerosis of both eyes - Both Eyes Early visually significance. Monitor. Update glasses for now.    Hyperopia, bilateral - Both Eyes     Regular astigmatism of both eyes - Both Eyes     History of stroke        Plan:  Glasses prescription given - recommend updating     Continue Patanol once daily as needed     Usha Lorenzo MD  [phone]

## 2023-11-14 ENCOUNTER — APPOINTMENT (OUTPATIENT)
Dept: OPTOMETRY | Facility: CLINIC | Age: 63
End: 2023-11-14
Payer: MEDICARE

## 2023-11-14 PROCEDURE — 92340 FIT SPECTACLES MONOFOCAL: CPT | Performed by: OPTOMETRIST

## 2023-12-19 ENCOUNTER — OFFICE VISIT (OUTPATIENT)
Dept: PHYSICAL MEDICINE AND REHAB | Facility: CLINIC | Age: 63
End: 2023-12-19
Payer: MEDICARE

## 2023-12-19 DIAGNOSIS — G81.11 RIGHT SPASTIC HEMIPARESIS (H): Primary | ICD-10-CM

## 2023-12-19 DIAGNOSIS — R26.9 ABNORMAL GAIT: ICD-10-CM

## 2023-12-19 DIAGNOSIS — G24.8 FOCAL DYSTONIA: ICD-10-CM

## 2023-12-19 DIAGNOSIS — M62.838 SPASM OF MUSCLE: ICD-10-CM

## 2023-12-19 PROCEDURE — 64643 CHEMODENERV 1 EXTREM 1-4 EA: CPT | Performed by: PHYSICAL MEDICINE & REHABILITATION

## 2023-12-19 PROCEDURE — 95873 GUIDE NERV DESTR ELEC STIM: CPT | Performed by: PHYSICAL MEDICINE & REHABILITATION

## 2023-12-19 PROCEDURE — 64644 CHEMODENERV 1 EXTREM 5/> MUS: CPT | Performed by: PHYSICAL MEDICINE & REHABILITATION

## 2023-12-19 NOTE — PROGRESS NOTES
The patient returns for a follow-up visit for her ongoing issues related to spastic hemiparesis affecting the right side.     She has been  seen by me previously at [facility] stroke [place].      Last seen here on 9/19/23 Botox done helped.     She has been complaining of pain in the left side of the neck and the lower back.  She has previously responded nicely to injection therapy with medial branch blocks to the lumbar and cervical region.  Both diagnostic and confirmatory blocks were done with good relief.  This was done in July and August 2019.  She has done physical therapy without relief and continues to do home exercise program without relief.  She has undergone diagnostic medial branch blocks for the left cervical and lumbar spine.  Due to aphasia, her pain diary is difficult.  Her spouse tells me that she was happy, moving better and not complaining of her usual pain for the duration of the anesthetic.  This was about 3 hours for the neck and more than 6 hours for the lower back.  Her pain has since returned.  Due to the number of years since the last 1 was done,Shad repeat MRI of the cervical spine looking for any other causes for her pain.    1. Multilevel mild cervical spondylosis most apparent at C5-6 where  there is mild right-sided neural foraminal stenosis.  2. 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 This may also be important if  any surgery or intervention is planned.     I have personally reviewed the examination and initial interpretation  and I agree with the findings.     ANGEL BARKER MD     If her back and neck pain return, we could repeat the medial branch blocks from C2-C5 including the third occipital nerves on the left side and do diagnostic and confirmatory test and if she gets good relief from this, she would be a candidate for radiofrequency ablations.   Due to her significant spasticity and stroke, she is not a candidate for the conventional physical therapy.  This may have to be done if insurance insists.  In the past she has had good relief with just the medial branch blocks and did not proceed to radiofrequency ablations.        She reports her pain and  function is better and she is more independent now.  She has history of sadness and is doing quite well currently and  is tolerating her antidepressant.        Past Medical History           Past Medical History:   Diagnosis Date    Acute ischemic left MCA stroke (H) 3/22/15    Essential hypertension, benign           Past Surgical History             Past Surgical History:   Procedure Laterality Date    NO HISTORY OF SURGERY             Current Outpatient Prescriptions               Current Outpatient Medications   Medication Sig Dispense Refill    aspirin (ASA) 325 MG tablet TAKE 1 TABLET BY MOUTH EVERY DAY 90 tablet 1    azelastine (OPTIVAR) 0.05 % ophthalmic solution Place 1 drop into both eyes 2 times daily as needed (for itchy water eyes) 5 mL 11    baclofen (LIORESAL) 20 MG tablet Take 1 tablet (20 mg) by mouth 3 times daily 270 tablet 3    escitalopram (LEXAPRO) 10 MG tablet Take 1 tablet (10 mg) by mouth daily 30 tablet 11    gabapentin (NEURONTIN) 400 MG capsule Take 1 capsule (400 mg) by mouth 3 times daily for arm mobility. 90 capsule 11    hydrochlorothiazide (HYDRODIURIL) 12.5 MG tablet Take 1 tablet (12.5 mg) by mouth daily as needed for blood pressure. 90 tablet 1    losartan (COZAAR) 50 MG tablet Take 1 tablet (50 mg) by mouth daily as needed for blood pressure. 90 tablet 1    metoprolol succinate ER (TOPROL-XL) 50 MG 24 hr tablet Take 1 tablet (50 mg) by mouth daily for blood pressure. 90 tablet 1    nabumetone (RELAFEN) 500 MG tablet TAKE 1-2 TABLETS (500-1,000 MG) BY MOUTH 2 TIMES DAILY AS NEEDED FOR PAIN IN ARM OR HIP WITH FOOD 60 tablet 1    pantoprazole (PROTONIX) 40 MG EC tablet Take 1  tablet (40 mg) by mouth daily for reflux. 90 tablet 3    SENEXON-S 8.6-50 MG tablet TAKE 1 TABLET BY MOUTH DAILY AS NEEDED FOR CONSTIPATION 30 tablet 4    simvastatin (ZOCOR) 20 MG tablet Take 1 tablet (20 mg) by mouth every evening for cholesterol. 90 tablet 1    triamcinolone (KENALOG) 0.1 % cream Apply sparingly to affected area three times daily as needed 80 g 0    VITAMIN D3 25 MCG (1000 UT) tablet TAKE 1 TABLET BY MOUTH EVERY DAY 30 tablet 10            LMP  (LMP Unknown)       On examination, the patient is in her manual wheelchair.  She transfers with assist of her . She has involvement of  right pectoralis major, right biceps brachii, flexor carpi ulnaris and flexor carpi radialis, flexor digitorum superficialis and flexor digitorum profundus.  She has increased tone in the flexor pollicis longus and  lumbricals. In the lower extremity . EHL. FDL and gastrocnemius are tight.  She is also tender over the left side of the neck where she is complaining of pain currently including the occipital region.  The right side is nontender.  The low back is nontender.     Impression: Right spastic hemiparesis, torticollis, gait abnormality and spasm of muscles due to cerebrovascular accident.  Cervical spondylosis on the left side.     Based on today's assessment, she would benefit from 600 units of Botox injections.  I will see her in follow-up in about a month's time to see how she is responding and plan future treatments every 12 weeks.     20 minutes for the evaluation and management portion of the visit, greater than 50% was for CC.     Procedure note: With her informed consent, after explaining the benefits and risks of the procedure, and using Betasept for skin prep, EMG for localization, preservative-free normal saline for dilution, 600 units of Botox, 100 units were injected into the right pectoralis major,  25 needs to right flexor pollicis longus, 50 unis for injected into the following muscles  lumbricals, 50 units into flexor carpi radialis, 50 units into flexor digitorum Superficialis, flexor digitorum profundus.  75 units each were injected into the biceps brachii,  50 units into FDL, EHL and 100 units into the gastrocnemius.. 600 units were utilized in all.  She tolerated it well.  She will use ice, Tylenol as needed.     Cheng Jean MD

## 2023-12-19 NOTE — NURSING NOTE
Chief Complaint   Patient presents with    RECHECK     Botox injections confirmed with patient     Cuca Zarate CMA at 1:33 PM on 12/19/2023.

## 2023-12-19 NOTE — LETTER
12/19/2023       RE: Anita Bennett  [address]  [place] [address]     Dear Colleague,    Thank you for referring your patient, Anita Bennett, to the [facility] PHYSICAL MEDICINE AND REHABILITATION CLINIC [place] at [facility]. Please see a copy of my visit note below.    The patient returns for a follow-up visit for her ongoing issues related to spastic hemiparesis affecting the right side.     She has been  seen by me previously at [facility].      Last seen here on 9/19/23 Botox done helped.     She has been complaining of pain in the left side of the neck and the lower back.  She has previously responded nicely to injection therapy with medial branch blocks to the lumbar and cervical region.  Both diagnostic and confirmatory blocks were done with good relief.  This was done in July and August 2019.  She has done physical therapy without relief and continues to do home exercise program without relief.  She has undergone diagnostic medial branch blocks for the left cervical and lumbar spine.  Due to aphasia, her pain diary is difficult.  Her spouse tells me that she was happy, moving better and not complaining of her usual pain for the duration of the anesthetic.  This was about 3 hours for the neck and more than 6 hours for the lower back.  Her pain has since returned.  Due to the number of years since the last 1 was done,Shad repeat MRI of the cervical spine looking for any other causes for her pain.    1. Multilevel mild cervical spondylosis most apparent at C5-6 where  there is mild right-sided neural foraminal stenosis.  2. 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 This may also be important if  any surgery or intervention is planned.     I have personally reviewed the examination and initial  interpretation  and I agree with the findings.     ANGEL BARKER MD     If her back and neck pain return, we could repeat the medial branch blocks from C2-C5 including the third occipital nerves on the left side and do diagnostic and confirmatory test and if she gets good relief from this, she would be a candidate for radiofrequency ablations.  Due to her significant spasticity and stroke, she is not a candidate for the conventional physical therapy.  This may have to be done if insurance insists.  In the past she has had good relief with just the medial branch blocks and did not proceed to radiofrequency ablations.        She reports her pain and  function is better and she is more independent now.  She has history of sadness and is doing quite well currently and  is tolerating her antidepressant.        Past Medical History           Past Medical History:   Diagnosis Date    Acute ischemic left MCA stroke (H) 3/22/15    Essential hypertension, benign           Past Surgical History             Past Surgical History:   Procedure Laterality Date    NO HISTORY OF SURGERY             Current Outpatient Prescriptions               Current Outpatient Medications   Medication Sig Dispense Refill    aspirin (ASA) 325 MG tablet TAKE 1 TABLET BY MOUTH EVERY DAY 90 tablet 1    azelastine (OPTIVAR) 0.05 % ophthalmic solution Place 1 drop into both eyes 2 times daily as needed (for itchy water eyes) 5 mL 11    baclofen (LIORESAL) 20 MG tablet Take 1 tablet (20 mg) by mouth 3 times daily 270 tablet 3    escitalopram (LEXAPRO) 10 MG tablet Take 1 tablet (10 mg) by mouth daily 30 tablet 11    gabapentin (NEURONTIN) 400 MG capsule Take 1 capsule (400 mg) by mouth 3 times daily for arm mobility. 90 capsule 11    hydrochlorothiazide (HYDRODIURIL) 12.5 MG tablet Take 1 tablet (12.5 mg) by mouth daily as needed for blood pressure. 90 tablet 1    losartan (COZAAR) 50 MG tablet Take 1 tablet (50 mg) by mouth daily as needed for blood  pressure. 90 tablet 1    metoprolol succinate ER (TOPROL-XL) 50 MG 24 hr tablet Take 1 tablet (50 mg) by mouth daily for blood pressure. 90 tablet 1    nabumetone (RELAFEN) 500 MG tablet TAKE 1-2 TABLETS (500-1,000 MG) BY MOUTH 2 TIMES DAILY AS NEEDED FOR PAIN IN ARM OR HIP WITH FOOD 60 tablet 1    pantoprazole (PROTONIX) 40 MG EC tablet Take 1 tablet (40 mg) by mouth daily for reflux. 90 tablet 3    SENEXON-S 8.6-50 MG tablet TAKE 1 TABLET BY MOUTH DAILY AS NEEDED FOR CONSTIPATION 30 tablet 4    simvastatin (ZOCOR) 20 MG tablet Take 1 tablet (20 mg) by mouth every evening for cholesterol. 90 tablet 1    triamcinolone (KENALOG) 0.1 % cream Apply sparingly to affected area three times daily as needed 80 g 0    VITAMIN D3 25 MCG (1000 UT) tablet TAKE 1 TABLET BY MOUTH EVERY DAY 30 tablet 10            LMP  (LMP Unknown)       On examination, the patient is in her manual wheelchair.  She transfers with assist of her . She has involvement of  right pectoralis major, right biceps brachii, flexor carpi ulnaris and flexor carpi radialis, flexor digitorum superficialis and flexor digitorum profundus.  She has increased tone in the flexor pollicis longus and  lumbricals. In the lower extremity . EHL. FDL and gastrocnemius are tight.  She is also tender over the left side of the neck where she is complaining of pain currently including the occipital region.  The right side is nontender.  The low back is nontender.     Impression: Right spastic hemiparesis, torticollis, gait abnormality and spasm of muscles due to cerebrovascular accident.  Cervical spondylosis on the left side.     Based on today's assessment, she would benefit from 600 units of Botox injections.  I will see her in follow-up in about a month's time to see how she is responding and plan future treatments every 12 weeks.     20 minutes for the evaluation and management portion of the visit, greater than 50% was for CC.     Procedure note: With her  informed consent, after explaining the benefits and risks of the procedure, and using Betasept for skin prep, EMG for localization, preservative-free normal saline for dilution, 600 units of Botox, 100 units were injected into the right pectoralis major,  25 needs to right flexor pollicis longus, 50 unis for injected into the following muscles lumbricals, 50 units into flexor carpi radialis, 50 units into flexor digitorum Superficialis, flexor digitorum profundus.  75 units each were injected into the biceps brachii,  50 units into FDL, EHL and 100 units into the gastrocnemius.. 600 units were utilized in all.  She tolerated it well.  She will use ice, Tylenol as needed.         Again, thank you for allowing me to participate in the care of your patient.      Sincerely,    Cheng Jean MD

## 2024-01-13 DIAGNOSIS — H10.13 ALLERGIC CONJUNCTIVITIS, BILATERAL: ICD-10-CM

## 2024-01-25 RX ORDER — OLOPATADINE HYDROCHLORIDE 1 MG/ML
1 SOLUTION/ DROPS OPHTHALMIC 2 TIMES DAILY
Qty: 5 ML | Refills: 11 | Status: SHIPPED | OUTPATIENT
Start: 2024-01-25

## 2024-02-05 ENCOUNTER — TELEPHONE (OUTPATIENT)
Dept: FAMILY MEDICINE | Facility: CLINIC | Age: 64
End: 2024-02-05
Payer: MEDICARE

## 2024-02-05 NOTE — TELEPHONE ENCOUNTER
Patient Quality Outreach    Patient is due for the following:   Depression  -  PHQ-9 needed    Next Steps:   Patient was assigned appropriate questionnaire to complete    Type of outreach:    Sent [employer] message.    Next Steps:  Reach out within 90 days via Letter.    Max number of attempts reached: No. Will try again in 90 days if patient still on fail list.    Questions for provider review:    None           Felicia Almeida MA

## 2024-02-11 ENCOUNTER — HEALTH MAINTENANCE LETTER (OUTPATIENT)
Age: 64
End: 2024-02-11

## 2024-02-16 DIAGNOSIS — G89.29 OTHER CHRONIC PAIN: ICD-10-CM

## 2024-02-16 DIAGNOSIS — G81.11 RIGHT SPASTIC HEMIPARESIS (H): Primary | ICD-10-CM

## 2024-02-16 DIAGNOSIS — M62.838 SPASM OF MUSCLE: ICD-10-CM

## 2024-02-16 DIAGNOSIS — R26.9 ABNORMAL GAIT: ICD-10-CM

## 2024-02-16 DIAGNOSIS — G24.8 FOCAL DYSTONIA: ICD-10-CM

## 2024-02-28 DIAGNOSIS — I10 ESSENTIAL HYPERTENSION WITH GOAL BLOOD PRESSURE LESS THAN 140/90: ICD-10-CM

## 2024-02-28 RX ORDER — HYDROCHLOROTHIAZIDE 12.5 MG/1
12.5 TABLET ORAL DAILY PRN
Qty: 90 TABLET | Refills: 0 | Status: SHIPPED | OUTPATIENT
Start: 2024-02-28 | End: 2024-03-21

## 2024-03-21 ENCOUNTER — OFFICE VISIT (OUTPATIENT)
Dept: FAMILY MEDICINE | Facility: CLINIC | Age: 64
End: 2024-03-21
Payer: MEDICARE

## 2024-03-21 VITALS
HEART RATE: 74 BPM | BODY MASS INDEX: 29.61 KG/M2 | HEIGHT: 60 IN | SYSTOLIC BLOOD PRESSURE: 110 MMHG | WEIGHT: 150.8 LBS | TEMPERATURE: 97.7 F | RESPIRATION RATE: 15 BRPM | DIASTOLIC BLOOD PRESSURE: 70 MMHG | OXYGEN SATURATION: 96 %

## 2024-03-21 DIAGNOSIS — Z28.21 VACCINATION NOT CARRIED OUT BECAUSE OF PATIENT REFUSAL: ICD-10-CM

## 2024-03-21 DIAGNOSIS — I69.920 APHASIA, LATE EFFECT OF CEREBROVASCULAR DISEASE: ICD-10-CM

## 2024-03-21 DIAGNOSIS — F33.0 MILD EPISODE OF RECURRENT MAJOR DEPRESSIVE DISORDER (H): ICD-10-CM

## 2024-03-21 DIAGNOSIS — Z86.73 HISTORY OF CVA (CEREBROVASCULAR ACCIDENT): ICD-10-CM

## 2024-03-21 DIAGNOSIS — G81.01 RIGHT FLACCID HEMIPLEGIA (H): ICD-10-CM

## 2024-03-21 DIAGNOSIS — Z29.11 NEED FOR VACCINATION AGAINST RESPIRATORY SYNCYTIAL VIRUS: ICD-10-CM

## 2024-03-21 DIAGNOSIS — I10 ESSENTIAL HYPERTENSION WITH GOAL BLOOD PRESSURE LESS THAN 140/90: Primary | ICD-10-CM

## 2024-03-21 LAB
ERYTHROCYTE [DISTWIDTH] IN BLOOD BY AUTOMATED COUNT: 11.7 % (ref 10–15)
HCT VFR BLD AUTO: 35.4 % (ref 35–47)
HGB BLD-MCNC: 11.5 G/DL (ref 11.7–15.7)
MCH RBC QN AUTO: 31.8 PG (ref 26.5–33)
MCHC RBC AUTO-ENTMCNC: 32.5 G/DL (ref 31.5–36.5)
MCV RBC AUTO: 98 FL (ref 78–100)
PLATELET # BLD AUTO: 174 10E3/UL (ref 150–450)
RBC # BLD AUTO: 3.62 10E6/UL (ref 3.8–5.2)
WBC # BLD AUTO: 3.8 10E3/UL (ref 4–11)

## 2024-03-21 PROCEDURE — 80061 LIPID PANEL: CPT | Performed by: FAMILY MEDICINE

## 2024-03-21 PROCEDURE — 82565 ASSAY OF CREATININE: CPT | Performed by: FAMILY MEDICINE

## 2024-03-21 PROCEDURE — 84460 ALANINE AMINO (ALT) (SGPT): CPT | Performed by: FAMILY MEDICINE

## 2024-03-21 PROCEDURE — 84450 TRANSFERASE (AST) (SGOT): CPT | Performed by: FAMILY MEDICINE

## 2024-03-21 PROCEDURE — 99214 OFFICE O/P EST MOD 30 MIN: CPT | Performed by: FAMILY MEDICINE

## 2024-03-21 PROCEDURE — 85027 COMPLETE CBC AUTOMATED: CPT | Performed by: FAMILY MEDICINE

## 2024-03-21 PROCEDURE — 84132 ASSAY OF SERUM POTASSIUM: CPT | Performed by: FAMILY MEDICINE

## 2024-03-21 PROCEDURE — 36415 COLL VENOUS BLD VENIPUNCTURE: CPT | Performed by: FAMILY MEDICINE

## 2024-03-21 RX ORDER — METOPROLOL SUCCINATE 50 MG/1
50 TABLET, EXTENDED RELEASE ORAL DAILY
Qty: 90 TABLET | Refills: 1 | Status: SHIPPED | OUTPATIENT
Start: 2024-03-21 | End: 2024-09-23

## 2024-03-21 RX ORDER — RESPIRATORY SYNCYTIAL VIRUS VACCINE 120MCG/0.5
0.5 KIT INTRAMUSCULAR ONCE
Qty: 1 EACH | Refills: 0 | Status: CANCELLED | OUTPATIENT
Start: 2024-03-21 | End: 2024-03-21

## 2024-03-21 RX ORDER — SIMVASTATIN 20 MG
20 TABLET ORAL EVERY EVENING
Qty: 90 TABLET | Refills: 1 | Status: SHIPPED | OUTPATIENT
Start: 2024-03-21 | End: 2024-09-23

## 2024-03-21 RX ORDER — LOSARTAN POTASSIUM 50 MG/1
50 TABLET ORAL DAILY PRN
Qty: 90 TABLET | Refills: 1 | Status: SHIPPED | OUTPATIENT
Start: 2024-03-21 | End: 2024-09-23

## 2024-03-21 RX ORDER — HYDROCHLOROTHIAZIDE 12.5 MG/1
12.5 TABLET ORAL DAILY PRN
Qty: 90 TABLET | Refills: 1 | Status: SHIPPED | OUTPATIENT
Start: 2024-03-21 | End: 2024-09-23

## 2024-03-21 ASSESSMENT — PAIN SCALES - GENERAL: PAINLEVEL: EXTREME PAIN (8)

## 2024-03-21 NOTE — PROGRESS NOTES
Assessment & Plan     (I10) Essential hypertension with goal blood pressure less than 140/90  (primary encounter diagnosis)  Comment: well controlled. Her  continues to titrate her losartan dose based on her blood pressure readings.   Plan: hydroCHLOROthiazide 12.5 MG tablet, losartan         (COZAAR) 50 MG tablet, metoprolol succinate ER         (TOPROL XL) 50 MG 24 hr tablet, Potassium,         Creatinine         Return in about 6 months (around 9/18/2024) for full physical with Pap smear, cholesterol, blood pressure check.      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 She receives nabumetone and gabapentin from the same office as well.   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   Plan: She receives escitalopram from her physiatrist.     (Z86.73) History of CVA (cerebrovascular accident)  (I69.920) Aphasia, late effect of cerebrovascular disease  Comment: we continue to treat her as a risk for ischemic CVD with a statin.   Plan: simvastatin (ZOCOR) 20 MG tablet, Lipid panel         reflex to direct LDL Non-fasting, ALT         Return in about 6 months (around 9/18/2024) for full physical with Pap smear, cholesterol, blood pressure check.      (Z28.21) Vaccination not carried out because of patient refusal  Comment: COVID-19   Plan:     (Z29.11) Need for vaccination against respiratory syncytial virus  Comment: pharmacy  Plan: VIS provided        Subjective   Anita is a 63 year old, presenting for the following health issues:  Hypertension and Lipids        3/21/2024    12:29 PM   Additional Questions   Roomed by Christina   Accompanied by      History of Present Illness       Hyperlipidemia:  She presents for follow up of hyperlipidemia.   She is taking medication to lower cholesterol. She is not having myalgia or other side  effects to statin medications.    Hypertension: She presents for follow up of hypertension.  She does check blood pressure  regularly outside of the clinic. Outside blood pressures have been over 140/90. She follows a low salt diet.     She eats 0-1 servings of fruits and vegetables daily.She consumes 0 sweetened beverage(s) daily.She exercises with enough effort to increase her heart rate 9 or less minutes per day.  She exercises with enough effort to increase her heart rate 3 or less days per week.   She is taking medications regularly.      reports he checks her blood pressure at home when she looks tired or sick and readings are mostly SBP in the 130s.       Review of Systems       Objective    /70 (BP Location: Right arm, Patient Position: Chair, Cuff Size: Adult Large)   Pulse 74   Temp 97.7  F (36.5  C) (Tympanic)   Resp 15   Ht 1.524 m (5')   Wt 68.4 kg (150 lb 12.8 oz)   LMP  (LMP Unknown)   SpO2 96%   BMI 29.45 kg/m    Body mass index is 29.45 kg/m .  Physical Exam   GENERAL: healthy, alert and no distress  EYES: Eyes grossly normal to inspection, PERRL, EOMI, sclerae white and conjunctivae normal  RESP: lungs clear to auscultation - no crackles or wheezes, no areas of dullness, no tachypnea  CV: Heart regular rate and rhythm without murmur, click or rub. No peripheral edema and peripheral pulses strong  MS: no gross musculoskeletal defects noted, no edema  SKIN: no suspicious lesions or rashes to visible skin  NEURO: Seated in her wheelchair because of her hemiplegia. She is aphasic but responds to me appropriately otherwise.   PSYCH: mentation appears normal, affect normal/bright           Signed Electronically by: Mulugeta Sharma MD    This document serves as a record of the services and decisions personally performed and made by Dr. Sharma. It was created on his behalf by Tori Dc, a trained medical scribe. The creation of this document is based the provider's statements to the  medical scribe.  Tori Dc,  1:33 PM

## 2024-03-22 LAB
ALT SERPL W P-5'-P-CCNC: 16 U/L (ref 0–50)
AST SERPL W P-5'-P-CCNC: 22 U/L (ref 0–45)
CHOLEST SERPL-MCNC: 135 MG/DL
CREAT SERPL-MCNC: 0.96 MG/DL (ref 0.51–0.95)
EGFRCR SERPLBLD CKD-EPI 2021: 67 ML/MIN/1.73M2
FASTING STATUS PATIENT QL REPORTED: YES
HDLC SERPL-MCNC: 34 MG/DL
LDLC SERPL CALC-MCNC: 64 MG/DL
NONHDLC SERPL-MCNC: 101 MG/DL
POTASSIUM SERPL-SCNC: 4.1 MMOL/L (ref 3.4–5.3)
TRIGL SERPL-MCNC: 185 MG/DL

## 2024-04-04 ENCOUNTER — OFFICE VISIT (OUTPATIENT)
Dept: PHYSICAL MEDICINE AND REHAB | Facility: CLINIC | Age: 64
End: 2024-04-04
Payer: MEDICARE

## 2024-04-04 VITALS
HEART RATE: 71 BPM | DIASTOLIC BLOOD PRESSURE: 64 MMHG | OXYGEN SATURATION: 95 % | SYSTOLIC BLOOD PRESSURE: 99 MMHG | RESPIRATION RATE: 16 BRPM

## 2024-04-04 DIAGNOSIS — G81.11 RIGHT SPASTIC HEMIPARESIS (H): Primary | ICD-10-CM

## 2024-04-04 DIAGNOSIS — G24.8 FOCAL DYSTONIA: ICD-10-CM

## 2024-04-04 DIAGNOSIS — R26.9 ABNORMAL GAIT: ICD-10-CM

## 2024-04-04 PROCEDURE — 64644 CHEMODENERV 1 EXTREM 5/> MUS: CPT | Performed by: PHYSICAL MEDICINE & REHABILITATION

## 2024-04-04 PROCEDURE — 95873 GUIDE NERV DESTR ELEC STIM: CPT | Performed by: PHYSICAL MEDICINE & REHABILITATION

## 2024-04-04 PROCEDURE — 64643 CHEMODENERV 1 EXTREM 1-4 EA: CPT | Performed by: PHYSICAL MEDICINE & REHABILITATION

## 2024-04-04 ASSESSMENT — PAIN SCALES - GENERAL: PAINLEVEL: NO PAIN (0)

## 2024-04-04 NOTE — LETTER
4/4/2024       RE: Anita Bennett  [address]  [place] [address]       Dear Colleague,    Thank you for referring your patient, Anita Bennett, to the [facility] PHYSICAL MEDICINE AND REHABILITATION CLINIC [place] at [facility]. Please see a copy of my visit note below.    The patient returns for a follow-up visit for her ongoing issues related to spastic hemiparesis affecting the right side.     She has been  seen by me previously at [facility].      Last seen here on 12/19/23 Botox done helped.     She has been complaining of pain in the left side of the neck and the lower back.  She has previously responded nicely to injection therapy with medial branch blocks to the lumbar and cervical region.  Both diagnostic and confirmatory blocks were done with good relief.  This was done in July and August 2019.  She has done physical therapy without relief and continues to do home exercise program without relief.  She has undergone diagnostic medial branch blocks for the left cervical and lumbar spine.  Due to aphasia, her pain diary is difficult.  Her spouse tells me that she was happy, moving better and not complaining of her usual pain for the duration of the anesthetic.  This was about 3 hours for the neck and more than 6 hours for the lower back.  Her pain has since returned.  Due to the number of years since the last 1 was done,Shad repeat MRI of the cervical spine looking for any other causes for her pain.     1. Multilevel mild cervical spondylosis most apparent at C5-6 where  there is mild right-sided neural foraminal stenosis.  2. 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 This may also be important if  any surgery or intervention is planned.     I have personally reviewed the examination and initial  interpretation  and I agree with the findings.     ANGEL BARKER MD      If her back and neck pain return, we could repeat the medial branch blocks from C2-C5 including the third occipital nerves on the left side and do diagnostic and confirmatory test and if she gets good relief from this, she would be a candidate for radiofrequency ablations.  Due to her significant spasticity and stroke, she is not a candidate for the conventional physical therapy.  This may have to be done if insurance insists.  In the past she has had good relief with just the medial branch blocks and did not proceed to radiofrequency ablations.        She reports her pain and  function is better and she is more independent now.  She has history of sadness and is doing quite well currently and  is tolerating her antidepressant.        Past Medical History           Past Medical History:   Diagnosis Date    Acute ischemic left MCA stroke (H) 3/22/15    Essential hypertension, benign           Past Surgical History             Past Surgical History:   Procedure Laterality Date    NO HISTORY OF SURGERY             Current Outpatient Prescriptions     Current Outpatient Medications   Medication Sig Dispense Refill    aspirin (ASA) 325 MG tablet TAKE 1 TABLET BY MOUTH EVERY DAY 90 tablet 3    azelastine (OPTIVAR) 0.05 % ophthalmic solution Place 1 drop into both eyes 2 times daily as needed (for itchy water eyes) 5 mL 1    baclofen (LIORESAL) 20 MG tablet Take 1 tablet (20 mg) by mouth 3 times daily 270 tablet 3    escitalopram (LEXAPRO) 10 MG tablet Take 1 tablet (10 mg) by mouth daily 90 tablet 3    gabapentin (NEURONTIN) 400 MG capsule Take 1 capsule (400 mg) by mouth 3 times daily for arm mobility. 270 capsule 3    hydroCHLOROthiazide 12.5 MG tablet Take 1 tablet (12.5 mg) by mouth daily as needed for blood pressure. 90 tablet 1    losartan (COZAAR) 50 MG tablet Take 1 tablet (50 mg) by mouth daily as needed for blood pressure. 90 tablet 1     metoprolol succinate ER (TOPROL XL) 50 MG 24 hr tablet Take 1 tablet (50 mg) by mouth daily for blood pressure. 90 tablet 1    nabumetone (RELAFEN) 500 MG tablet Take 1 tablet (500 mg) by mouth 2 times daily (with meals) as needed for pain in arm or hip. 180 tablet 3    olopatadine (PATANOL) 0.1 % ophthalmic solution Place 1 drop into both eyes 2 times daily 5 mL 11    pantoprazole (PROTONIX) 40 MG EC tablet Take 1 tablet (40 mg) by mouth daily for reflux. 90 tablet 3    senna-docusate (SENEXON-S) 8.6-50 MG tablet TAKE 1 TABLET BY MOUTH DAILY AS NEEDED FOR CONSTIPATION. 30 tablet 1    simvastatin (ZOCOR) 20 MG tablet Take 1 tablet (20 mg) by mouth every evening for cholesterol. 90 tablet 1    VITAMIN D3 25 MCG (1000 UT) tablet TAKE 1 TABLET BY MOUTH EVERY DAY 90 tablet 0     LMP  (LMP Unknown)       On examination, the patient is in her manual wheelchair.  She transfers with assist of her . She has involvement of  right pectoralis major, right biceps brachii, flexor carpi ulnaris and flexor carpi radialis, flexor digitorum superficialis and flexor digitorum profundus.  She has increased tone in the flexor pollicis longus and  lumbricals. In the lower extremity . EHL. FDL and gastrocnemius are tight.  She is also tender over the left side of the neck where she is complaining of pain currently including the occipital region.  The right side is nontender.  The low back is nontender.     Impression: Right spastic hemiparesis, torticollis, gait abnormality and spasm of muscles due to cerebrovascular accident.  Cervical spondylosis on the left side.     Based on today's assessment, she would benefit from 600 units of Botox injections.  I will see her in follow-up in about a month's time to see how she is responding and plan future treatments every 12 weeks.     20 minutes, exclusive of procedure, for the evaluation and management portion of the visit, greater than 50% was for CC.     Procedure note: With her  informed consent, after explaining the benefits and risks of the procedure, and using Betasept for skin prep, EMG for localization, preservative-free normal saline for dilution, 600 units of Botox, 100 units were injected into the right pectoralis major,  25 needs to right flexor pollicis longus, 50 unis for injected into the following muscles lumbricals, 50 units into flexor carpi radialis, 50 units into flexor digitorum Superficialis, flexor digitorum profundus.  75 units each were injected into the biceps brachii,  50 units into FDL, EHL and 100 units into the gastrocnemius.. 600 units were utilized in all.  She tolerated it well.  She will use ice, Tylenol as needed.           Again, thank you for allowing me to participate in the care of your patient.      Sincerely,    Cheng Jean MD

## 2024-04-04 NOTE — PROGRESS NOTES
The patient returns for a follow-up visit for her ongoing issues related to spastic hemiparesis affecting the right side.     She has been  seen by me previously at [facility] [place].      Last seen here on 12/19/23 Botox done helped.     She has been complaining of pain in the left side of the neck and the lower back.  She has previously responded nicely to injection therapy with medial branch blocks to the lumbar and cervical region.  Both diagnostic and confirmatory blocks were done with good relief.  This was done in July and August 2019.  She has done physical therapy without relief and continues to do home exercise program without relief.  She has undergone diagnostic medial branch blocks for the left cervical and lumbar spine.  Due to aphasia, her pain diary is difficult.  Her spouse tells me that she was happy, moving better and not complaining of her usual pain for the duration of the anesthetic.  This was about 3 hours for the neck and more than 6 hours for the lower back.  Her pain has since returned.  Due to the number of years since the last 1 was done,Shad repeat MRI of the cervical spine looking for any other causes for her pain.     1. Multilevel mild cervical spondylosis most apparent at C5-6 where  there is mild right-sided neural foraminal stenosis.  2. 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 This may also be important if  any surgery or intervention is planned.     I have personally reviewed the examination and initial interpretation  and I agree with the findings.     ANGEL BRAKER MD      If her back and neck pain return, we could repeat the medial branch blocks from C2-C5 including the third occipital nerves on the left side and do diagnostic and confirmatory test and if she gets good relief from this, she would be a candidate for radiofrequency  ablations.  Due to her significant spasticity and stroke, she is not a candidate for the conventional physical therapy.  This may have to be done if insurance insists.  In the past she has had good relief with just the medial branch blocks and did not proceed to radiofrequency ablations.        She reports her pain and  function is better and she is more independent now.  She has history of sadness and is doing quite well currently and  is tolerating her antidepressant.        Past Medical History           Past Medical History:   Diagnosis Date    Acute ischemic left MCA stroke (H) 3/22/15    Essential hypertension, benign           Past Surgical History             Past Surgical History:   Procedure Laterality Date    NO HISTORY OF SURGERY             Current Outpatient Prescriptions     Current Outpatient Medications   Medication Sig Dispense Refill    aspirin (ASA) 325 MG tablet TAKE 1 TABLET BY MOUTH EVERY DAY 90 tablet 3    azelastine (OPTIVAR) 0.05 % ophthalmic solution Place 1 drop into both eyes 2 times daily as needed (for itchy water eyes) 5 mL 1    baclofen (LIORESAL) 20 MG tablet Take 1 tablet (20 mg) by mouth 3 times daily 270 tablet 3    escitalopram (LEXAPRO) 10 MG tablet Take 1 tablet (10 mg) by mouth daily 90 tablet 3    gabapentin (NEURONTIN) 400 MG capsule Take 1 capsule (400 mg) by mouth 3 times daily for arm mobility. 270 capsule 3    hydroCHLOROthiazide 12.5 MG tablet Take 1 tablet (12.5 mg) by mouth daily as needed for blood pressure. 90 tablet 1    losartan (COZAAR) 50 MG tablet Take 1 tablet (50 mg) by mouth daily as needed for blood pressure. 90 tablet 1    metoprolol succinate ER (TOPROL XL) 50 MG 24 hr tablet Take 1 tablet (50 mg) by mouth daily for blood pressure. 90 tablet 1    nabumetone (RELAFEN) 500 MG tablet Take 1 tablet (500 mg) by mouth 2 times daily (with meals) as needed for pain in arm or hip. 180 tablet 3    olopatadine (PATANOL) 0.1 % ophthalmic solution Place 1 drop into  both eyes 2 times daily 5 mL 11    pantoprazole (PROTONIX) 40 MG EC tablet Take 1 tablet (40 mg) by mouth daily for reflux. 90 tablet 3    senna-docusate (SENEXON-S) 8.6-50 MG tablet TAKE 1 TABLET BY MOUTH DAILY AS NEEDED FOR CONSTIPATION. 30 tablet 1    simvastatin (ZOCOR) 20 MG tablet Take 1 tablet (20 mg) by mouth every evening for cholesterol. 90 tablet 1    VITAMIN D3 25 MCG (1000 UT) tablet TAKE 1 TABLET BY MOUTH EVERY DAY 90 tablet 0     LMP  (LMP Unknown)       On examination, the patient is in her manual wheelchair.  She transfers with assist of her . She has involvement of  right pectoralis major, right biceps brachii, flexor carpi ulnaris and flexor carpi radialis, flexor digitorum superficialis and flexor digitorum profundus.  She has increased tone in the flexor pollicis longus and  lumbricals. In the lower extremity . EHL. FDL and gastrocnemius are tight.  She is also tender over the left side of the neck where she is complaining of pain currently including the occipital region.  The right side is nontender.  The low back is nontender.     Impression: Right spastic hemiparesis, right side dominant,  torticollis, gait abnormality and spasm of muscles due to cerebrovascular accident.  Cervical spondylosis on the left side.     Based on today's assessment, she would benefit from 600 units of Botox injections.  I will see her in follow-up in about a month's time to see how she is responding and plan future treatments every 12 weeks.     20 minutes, exclusive of procedure, for the evaluation and management portion of the visit, greater than 50% was for CC.     Procedure note: With her informed consent, after explaining the benefits and risks of the procedure, and using Betasept for skin prep, EMG for localization, preservative-free normal saline for dilution, 600 units of Botox, 100 units were injected into the right pectoralis major,  25 needs to right flexor pollicis longus, 50 unis for injected  into the following muscles lumbricals, 50 units into flexor carpi radialis, 50 units into flexor digitorum Superficialis, flexor digitorum profundus.  75 units each were injected into the biceps brachii,  50 units into FDL, EHL and 100 units into the gastrocnemius.. 600 units were utilized in all.  She tolerated it well.  She will use ice, Tylenol as needed.     Cheng Jean MD

## 2024-04-12 ENCOUNTER — TELEPHONE (OUTPATIENT)
Dept: FAMILY MEDICINE | Facility: CLINIC | Age: 64
End: 2024-04-12
Payer: MEDICARE

## 2024-04-12 NOTE — TELEPHONE ENCOUNTER
Patient Quality Outreach    Patient is due for the following:   Breast Cancer Screening - Mammogram    Next Steps:   Schedule a office visit for mammogram    Type of outreach:    Sent letter.      Questions for provider review:    None           Belle Zarate MA

## 2024-04-12 NOTE — LETTER
[facility]  [address] [address]  [phone]  Dept: [phone]    April 12, 2024    Anita Bennett  [address]  [place] [address]    Dear Anita,    At [facility] we care about your health and are committed to providing quality patient care.     Here is a list of Health Maintenance topics that are due now or due soon:  Health Maintenance Due   Topic Date Due    RSV VACCINE (Pregnancy & 60+) (1 - 1-dose 60+ series) Never done    COVID-19 Vaccine (5 - 2023-24 season) 09/01/2023    MAMMO SCREENING  11/08/2023    HPV TEST  03/25/2024    PAP  03/25/2024        We are recommending that you:  Schedule a MAMMOGRAM which is due.    1 in 8 women will develop invasive breast cancer during her lifetime and it is the most common non-skin cancer in [place] women.  EARLY detection, new treatments, and a better understanding of the disease have increased survival rates - the 5 year survival rate in the 1960s was 63% and today it is close to 90%.    If you are under/uninsured, we recommend you contact the [facility]. They offer mammograms at no charge or on a sliding fee charge. You can schedule with them at [phone]. Please have them send us the results.      Please disregard this reminder if you have had this exam elsewhere within the last year.  It would be helpful for us to have a copy of your mammogram report in your file so that we can best coordinate your care - please contact us with when your test was done so we can update your record.    To schedule an appointment or discuss this further, you may contact us by phone at the [facility] at [phone] or online through the patient [email or web address]    Thank you for trusting [facility] and we appreciate the opportunity to serve you.  We look forward to supporting your healthcare needs in the  future.    Your partners in health,      Quality Committee at [facility]

## 2024-04-23 ENCOUNTER — ANCILLARY PROCEDURE (OUTPATIENT)
Dept: MAMMOGRAPHY | Facility: CLINIC | Age: 64
End: 2024-04-23
Attending: FAMILY MEDICINE
Payer: MEDICARE

## 2024-04-23 DIAGNOSIS — Z12.31 ENCOUNTER FOR SCREENING MAMMOGRAM FOR BREAST CANCER: ICD-10-CM

## 2024-04-23 PROCEDURE — 77067 SCR MAMMO BI INCL CAD: CPT | Mod: GC | Performed by: RADIOLOGY

## 2024-06-24 ENCOUNTER — MYC REFILL (OUTPATIENT)
Dept: PHYSICAL MEDICINE AND REHAB | Facility: CLINIC | Age: 64
End: 2024-06-24
Payer: MEDICARE

## 2024-06-24 DIAGNOSIS — M62.838 SPASM OF MUSCLE: ICD-10-CM

## 2024-06-25 ENCOUNTER — MYC REFILL (OUTPATIENT)
Dept: PHYSICAL MEDICINE AND REHAB | Facility: CLINIC | Age: 64
End: 2024-06-25
Payer: MEDICARE

## 2024-06-25 DIAGNOSIS — F32.89 ATYPICAL DEPRESSIVE DISORDER: ICD-10-CM

## 2024-06-25 RX ORDER — BACLOFEN 20 MG/1
20 TABLET ORAL 3 TIMES DAILY
Qty: 270 TABLET | Refills: 3 | Status: SHIPPED | OUTPATIENT
Start: 2024-06-25

## 2024-06-25 RX ORDER — ESCITALOPRAM OXALATE 10 MG/1
10 TABLET ORAL DAILY
Qty: 90 TABLET | Refills: 3 | Status: SHIPPED | OUTPATIENT
Start: 2024-06-25

## 2024-06-25 NOTE — TELEPHONE ENCOUNTER
Refill request received from patient via My Chart    Medication: escitalopram (LEXAPRO) 10 MG tablet   Directions: Take 1 tablet (10 mg) by mouth daily      Last ordered: 6/20/23   Qty: 90  RF: 3  Last OV: 4/4/24  Next OV: 6/27/24    Routing to Dr. Jean to review and sign if appropriate.    DENA Phillips RN Care Coordinator  [facility]   [facility] main phone # [phone] fax # [phone]

## 2024-06-25 NOTE — TELEPHONE ENCOUNTER
Refill request received from patient via My Chart message    Medication: baclofen (LIORESAL) 20 MG tablet   Directions: Take 1 tablet (20 mg) by mouth 3 times daily      Last ordered: 6/20/23   Qty: 270  RF: 3  Last OV: 4/4/24  Next OV: 6/27/24    Routing to Dr. Jean to review and sign if appropriate.    SANTOS PhillipsN RN Care Coordinator  [facility]   [facility] main phone # [phone] fax # [phone]

## 2024-06-27 ENCOUNTER — OFFICE VISIT (OUTPATIENT)
Dept: PHYSICAL MEDICINE AND REHAB | Facility: CLINIC | Age: 64
End: 2024-06-27
Payer: MEDICARE

## 2024-06-27 VITALS
OXYGEN SATURATION: 94 % | RESPIRATION RATE: 16 BRPM | SYSTOLIC BLOOD PRESSURE: 103 MMHG | HEART RATE: 62 BPM | DIASTOLIC BLOOD PRESSURE: 66 MMHG

## 2024-06-27 DIAGNOSIS — R26.9 ABNORMAL GAIT: ICD-10-CM

## 2024-06-27 DIAGNOSIS — M62.838 SPASM OF MUSCLE: ICD-10-CM

## 2024-06-27 DIAGNOSIS — G24.8 FOCAL DYSTONIA: ICD-10-CM

## 2024-06-27 DIAGNOSIS — G81.11 RIGHT SPASTIC HEMIPARESIS (H): Primary | ICD-10-CM

## 2024-06-27 PROCEDURE — 64644 CHEMODENERV 1 EXTREM 5/> MUS: CPT | Performed by: PHYSICAL MEDICINE & REHABILITATION

## 2024-06-27 PROCEDURE — 95873 GUIDE NERV DESTR ELEC STIM: CPT | Performed by: PHYSICAL MEDICINE & REHABILITATION

## 2024-06-27 PROCEDURE — 64643 CHEMODENERV 1 EXTREM 1-4 EA: CPT | Performed by: PHYSICAL MEDICINE & REHABILITATION

## 2024-06-27 ASSESSMENT — PAIN SCALES - GENERAL: PAINLEVEL: NO PAIN (0)

## 2024-06-27 NOTE — PROGRESS NOTES
The patient returns for a follow-up visit for her ongoing issues related to spastic hemiparesis affecting the right side.     She has been  seen by me previously at [facility] stroke [place].      Last seen here on 4/4/24. Botox done helped.     She has a history of pain in the left side of the neck and the lower back.  She has previously responded nicely to injection therapy with medial branch blocks to the lumbar and cervical region.  Both diagnostic and confirmatory blocks were done with good relief.  This was done in July and August 2019.  She has done physical therapy without relief and continues to do home exercise program without relief.  She has undergone diagnostic medial branch blocks for the left cervical and lumbar spine.  Due to aphasia, her pain diary is difficult.  Her spouse tells me that she was happy, moving better and not complaining of her usual pain for the duration of the anesthetic.  This was about 3 hours for the neck and more than 6 hours for the lower back.  Her pain has since returned.  Due to the number of years since the last 1 was done,Shad repeat MRI of the cervical spine looking for any other causes for her pain.     1. Multilevel mild cervical spondylosis most apparent at C5-6 where  there is mild right-sided neural foraminal stenosis.  2. 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 This may also be important if  any surgery or intervention is planned.     I have personally reviewed the examination and initial interpretation  and I agree with the findings.     ANGEL BARKER MD      If her back and neck pain return, we could repeat the medial branch blocks from C2-C5 including the third occipital nerves on the left side and do diagnostic and confirmatory test and if she gets good relief from this, she would be a candidate for radiofrequency ablations.  Due  to her significant spasticity and stroke, she is not a candidate for the conventional physical therapy.  This may have to be done if insurance insists.  In the past she has had good relief with just the medial branch blocks and did not proceed to radiofrequency ablations.        She reports her pain and  function is better and she is more independent now.  She has history of sadness and is doing quite well currently and  is tolerating her antidepressant.        Past Medical History           Past Medical History:   Diagnosis Date    Acute ischemic left MCA stroke (H) 3/22/15    Essential hypertension, benign           Past Surgical History             Past Surgical History:   Procedure Laterality Date    NO HISTORY OF SURGERY             Current Outpatient Prescriptions      Current Outpatient Prescriptions     Current Outpatient Medications   Medication Sig Dispense Refill    aspirin (ASA) 325 MG tablet TAKE 1 TABLET BY MOUTH EVERY DAY 90 tablet 3    azelastine (OPTIVAR) 0.05 % ophthalmic solution Place 1 drop into both eyes 2 times daily as needed (for itchy water eyes) 5 mL 1    baclofen (LIORESAL) 20 MG tablet Take 1 tablet (20 mg) by mouth 3 times daily 270 tablet 3    escitalopram (LEXAPRO) 10 MG tablet Take 1 tablet (10 mg) by mouth daily 90 tablet 3    gabapentin (NEURONTIN) 400 MG capsule Take 1 capsule (400 mg) by mouth 3 times daily for arm mobility. 270 capsule 3    hydroCHLOROthiazide 12.5 MG tablet Take 1 tablet (12.5 mg) by mouth daily as needed for blood pressure. 90 tablet 1    losartan (COZAAR) 50 MG tablet Take 1 tablet (50 mg) by mouth daily as needed for blood pressure. 90 tablet 1    metoprolol succinate ER (TOPROL XL) 50 MG 24 hr tablet Take 1 tablet (50 mg) by mouth daily for blood pressure. 90 tablet 1    nabumetone (RELAFEN) 500 MG tablet Take 1 tablet (500 mg) by mouth 2 times daily (with meals) as needed for pain in arm or hip. 180 tablet 3    olopatadine (PATANOL) 0.1 % ophthalmic  solution Place 1 drop into both eyes 2 times daily 5 mL 11    pantoprazole (PROTONIX) 40 MG EC tablet Take 1 tablet (40 mg) by mouth daily for reflux. 90 tablet 3    senna-docusate (SENEXON-S) 8.6-50 MG tablet TAKE 1 TABLET BY MOUTH DAILY AS NEEDED FOR CONSTIPATION. 30 tablet 1    simvastatin (ZOCOR) 20 MG tablet Take 1 tablet (20 mg) by mouth every evening for cholesterol. 90 tablet 1    VITAMIN D3 25 MCG (1000 UT) tablet TAKE 1 TABLET BY MOUTH EVERY DAY 90 tablet 0             On examination, the patient is in her manual wheelchair.  She transfers with assist of her . She has involvement of  right pectoralis major, right biceps brachii, flexor carpi ulnaris and flexor carpi radialis, flexor digitorum superficialis and flexor digitorum profundus.  She has increased tone in the flexor pollicis longus and  lumbricals. In the lower extremity . EHL. FDL and gastrocnemius are tight.  She is also tender over the left side of the neck where she is complaining of pain currently including the occipital region.  The right side is nontender.  The low back is nontender.     Impression: Right spastic hemiparesis, right side dominant,  torticollis, gait abnormality and spasm of muscles due to cerebrovascular accident.  Cervical spondylosis on the left side.     Based on today's assessment, she would benefit from 600 units of Botox injections.  I will see her in follow-up in about a month's time to see how she is responding and plan future treatments every 12 weeks.     20 minutes, exclusive of procedure, for the evaluation and management portion of the visit, greater than 50% was for CC.     Procedure note: With her informed consent, after explaining the benefits and risks of the procedure, and using Betasept for skin prep, EMG for localization, preservative-free normal saline for dilution, 600 units of Botox, 100 units were injected into the right pectoralis major,  25 needs to right flexor pollicis longus, 50 unis for  injected into the following muscles lumbricals, 50 units into flexor carpi radialis, 50 units into flexor digitorum Superficialis, flexor digitorum profundus.  75 units each were injected into the biceps brachii,  50 units into FDL, EHL and 100 units into the gastrocnemius.. 600 units were utilized in all.  She tolerated it well.  She will use ice, Tylenol as needed.     Cheng Jean MD

## 2024-06-27 NOTE — LETTER
6/27/2024       RE: Anita Bennett  [address]  [place] [address]     Dear Colleague,    Thank you for referring your patient, Anita Bennett, to the [facility] [place] [facility]. Please see a copy of my visit note below.    The patient returns for a follow-up visit for her ongoing issues related to spastic hemiparesis affecting the right side.     She has been  seen by me previously at [facility].      Last seen here on 4/4/24. Botox done helped.     She has a history of pain in the left side of the neck and the lower back.  She has previously responded nicely to injection therapy with medial branch blocks to the lumbar and cervical region.  Both diagnostic and confirmatory blocks were done with good relief.  This was done in July and August 2019.  She has done physical therapy without relief and continues to do home exercise program without relief.  She has undergone diagnostic medial branch blocks for the left cervical and lumbar spine.  Due to aphasia, her pain diary is difficult.  Her spouse tells me that she was happy, moving better and not complaining of her usual pain for the duration of the anesthetic.  This was about 3 hours for the neck and more than 6 hours for the lower back.  Her pain has since returned.  Due to the number of years since the last 1 was done,Shad repeat MRI of the cervical spine looking for any other causes for her pain.     1. Multilevel mild cervical spondylosis most apparent at C5-6 where  there is mild right-sided neural foraminal stenosis.  2. 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 This may also be important if  any surgery or intervention is planned.     I have personally reviewed the examination and initial  interpretation  and I agree with the findings.     ANGEL BARKER MD      If her back and neck pain return, we could repeat the medial branch blocks from C2-C5 including the third occipital nerves on the left side and do diagnostic and confirmatory test and if she gets good relief from this, she would be a candidate for radiofrequency ablations.  Due to her significant spasticity and stroke, she is not a candidate for the conventional physical therapy.  This may have to be done if insurance insists.  In the past she has had good relief with just the medial branch blocks and did not proceed to radiofrequency ablations.        She reports her pain and  function is better and she is more independent now.  She has history of sadness and is doing quite well currently and  is tolerating her antidepressant.        Past Medical History           Past Medical History:   Diagnosis Date    Acute ischemic left MCA stroke (H) 3/22/15    Essential hypertension, benign           Past Surgical History             Past Surgical History:   Procedure Laterality Date    NO HISTORY OF SURGERY             Current Outpatient Prescriptions      Current Outpatient Prescriptions     Current Outpatient Medications   Medication Sig Dispense Refill    aspirin (ASA) 325 MG tablet TAKE 1 TABLET BY MOUTH EVERY DAY 90 tablet 3    azelastine (OPTIVAR) 0.05 % ophthalmic solution Place 1 drop into both eyes 2 times daily as needed (for itchy water eyes) 5 mL 1    baclofen (LIORESAL) 20 MG tablet Take 1 tablet (20 mg) by mouth 3 times daily 270 tablet 3    escitalopram (LEXAPRO) 10 MG tablet Take 1 tablet (10 mg) by mouth daily 90 tablet 3    gabapentin (NEURONTIN) 400 MG capsule Take 1 capsule (400 mg) by mouth 3 times daily for arm mobility. 270 capsule 3    hydroCHLOROthiazide 12.5 MG tablet Take 1 tablet (12.5 mg) by mouth daily as needed for blood pressure. 90 tablet 1    losartan (COZAAR) 50 MG tablet Take 1 tablet (50 mg) by mouth daily as needed  for blood pressure. 90 tablet 1    metoprolol succinate ER (TOPROL XL) 50 MG 24 hr tablet Take 1 tablet (50 mg) by mouth daily for blood pressure. 90 tablet 1    nabumetone (RELAFEN) 500 MG tablet Take 1 tablet (500 mg) by mouth 2 times daily (with meals) as needed for pain in arm or hip. 180 tablet 3    olopatadine (PATANOL) 0.1 % ophthalmic solution Place 1 drop into both eyes 2 times daily 5 mL 11    pantoprazole (PROTONIX) 40 MG EC tablet Take 1 tablet (40 mg) by mouth daily for reflux. 90 tablet 3    senna-docusate (SENEXON-S) 8.6-50 MG tablet TAKE 1 TABLET BY MOUTH DAILY AS NEEDED FOR CONSTIPATION. 30 tablet 1    simvastatin (ZOCOR) 20 MG tablet Take 1 tablet (20 mg) by mouth every evening for cholesterol. 90 tablet 1    VITAMIN D3 25 MCG (1000 UT) tablet TAKE 1 TABLET BY MOUTH EVERY DAY 90 tablet 0        On examination, the patient is in her manual wheelchair.  She transfers with assist of her . She has involvement of  right pectoralis major, right biceps brachii, flexor carpi ulnaris and flexor carpi radialis, flexor digitorum superficialis and flexor digitorum profundus.  She has increased tone in the flexor pollicis longus and  lumbricals. In the lower extremity . EHL. FDL and gastrocnemius are tight.  She is also tender over the left side of the neck where she is complaining of pain currently including the occipital region.  The right side is nontender.  The low back is nontender.     Impression: Right spastic hemiparesis, right side dominant,  torticollis, gait abnormality and spasm of muscles due to cerebrovascular accident.  Cervical spondylosis on the left side.     Based on today's assessment, she would benefit from 600 units of Botox injections.  I will see her in follow-up in about a month's time to see how she is responding and plan future treatments every 12 weeks.     20 minutes, exclusive of procedure, for the evaluation and management portion of the visit, greater than 50% was for  CC.     Procedure note: With her informed consent, after explaining the benefits and risks of the procedure, and using Betasept for skin prep, EMG for localization, preservative-free normal saline for dilution, 600 units of Botox, 100 units were injected into the right pectoralis major,  25 needs to right flexor pollicis longus, 50 unis for injected into the following muscles lumbricals, 50 units into flexor carpi radialis, 50 units into flexor digitorum Superficialis, flexor digitorum profundus.  75 units each were injected into the biceps brachii,  50 units into FDL, EHL and 100 units into the gastrocnemius.. 600 units were utilized in all.  She tolerated it well.  She will use ice, Tylenol as needed.           Again, thank you for allowing me to participate in the care of your patient.      Sincerely,    Cheng Jean MD

## 2024-07-08 DIAGNOSIS — G81.11 RIGHT SPASTIC HEMIPARESIS (H): ICD-10-CM

## 2024-07-08 DIAGNOSIS — R26.9 ABNORMAL GAIT: ICD-10-CM

## 2024-07-08 DIAGNOSIS — G89.29 OTHER CHRONIC PAIN: ICD-10-CM

## 2024-07-08 DIAGNOSIS — M79.601 RIGHT UPPER LIMB PAIN: ICD-10-CM

## 2024-07-08 DIAGNOSIS — M25.511 RIGHT SHOULDER PAIN, UNSPECIFIED CHRONICITY: ICD-10-CM

## 2024-07-08 NOTE — TELEPHONE ENCOUNTER
Refill request received from pharmacy.     Medication: Nabumetone 500 mg tablet   Directions: Take 1 tablet (500 mg) by mouth 2 times daily (with meals) as needed for pain in arm or hip.     Last ordered: 5/5/2024   Qty: 180   RF: 0     Medication: Gabapentin 400 MG Capsule  Directions: Take 1 capsule (400 mg) by mouth 3 times daily for arm mobility.     Last ordered: 4/12/2024  Qty: TAKE 1 CAPSULE (400 MG) BY MOUTH 3 TIMES DAILY FOR ARM MOBILITY.   RF: 0     Last OV: 06/27/2024  Next OV: 09/19/2024    Routing to Dr. Jean to review and sign if appropriate.    Aparna Pedroza RN Care Coordinator  [facility]   [facility] main phone # [phone] fax # [phone]

## 2024-07-09 RX ORDER — GABAPENTIN 400 MG/1
400 CAPSULE ORAL 3 TIMES DAILY
Qty: 270 CAPSULE | Refills: 3 | Status: SHIPPED | OUTPATIENT
Start: 2024-07-09

## 2024-07-09 RX ORDER — NABUMETONE 500 MG/1
500 TABLET, FILM COATED ORAL 2 TIMES DAILY WITH MEALS
Qty: 180 TABLET | Refills: 3 | Status: SHIPPED | OUTPATIENT
Start: 2024-07-09

## 2024-08-05 DIAGNOSIS — I10 ESSENTIAL HYPERTENSION WITH GOAL BLOOD PRESSURE LESS THAN 140/90: ICD-10-CM

## 2024-08-05 RX ORDER — HYDROCHLOROTHIAZIDE 12.5 MG/1
12.5 TABLET ORAL DAILY PRN
Qty: 90 TABLET | Refills: 1 | OUTPATIENT
Start: 2024-08-05

## 2024-08-05 RX ORDER — METOPROLOL SUCCINATE 50 MG/1
50 TABLET, EXTENDED RELEASE ORAL DAILY
Qty: 90 TABLET | Refills: 1 | OUTPATIENT
Start: 2024-08-05

## 2024-09-02 DIAGNOSIS — K21.9 GASTROESOPHAGEAL REFLUX DISEASE WITHOUT ESOPHAGITIS: ICD-10-CM

## 2024-09-03 RX ORDER — PANTOPRAZOLE SODIUM 40 MG/1
40 TABLET, DELAYED RELEASE ORAL DAILY
Qty: 90 TABLET | Refills: 1 | Status: SHIPPED | OUTPATIENT
Start: 2024-09-03

## 2024-09-19 ENCOUNTER — OFFICE VISIT (OUTPATIENT)
Dept: PHYSICAL MEDICINE AND REHAB | Facility: CLINIC | Age: 64
End: 2024-09-19
Payer: MEDICARE

## 2024-09-19 DIAGNOSIS — G81.11 RIGHT SPASTIC HEMIPARESIS (H): Primary | ICD-10-CM

## 2024-09-19 DIAGNOSIS — G24.8 FOCAL DYSTONIA: ICD-10-CM

## 2024-09-19 DIAGNOSIS — R26.9 ABNORMAL GAIT: ICD-10-CM

## 2024-09-19 DIAGNOSIS — M62.838 SPASM OF MUSCLE: ICD-10-CM

## 2024-09-19 PROCEDURE — 99213 OFFICE O/P EST LOW 20 MIN: CPT | Mod: 25 | Performed by: PHYSICAL MEDICINE & REHABILITATION

## 2024-09-19 PROCEDURE — 64643 CHEMODENERV 1 EXTREM 1-4 EA: CPT | Performed by: PHYSICAL MEDICINE & REHABILITATION

## 2024-09-19 PROCEDURE — 64644 CHEMODENERV 1 EXTREM 5/> MUS: CPT | Performed by: PHYSICAL MEDICINE & REHABILITATION

## 2024-09-19 PROCEDURE — 95874 GUIDE NERV DESTR NEEDLE EMG: CPT | Performed by: PHYSICAL MEDICINE & REHABILITATION

## 2024-09-19 NOTE — NURSING NOTE
Chief Complaint   Patient presents with    Procedure     Here for injections, confirmed with patient     Adamaris Kinney

## 2024-09-19 NOTE — PROGRESS NOTES
The patient returns for a follow-up visit for her ongoing issues related to spastic hemiparesis affecting the right side.     She has been  seen by me previously at [facility] stroke [place].      Last seen here on 06/27/24 Botox done helped. No falls. Feeling positive. Tolerating activities.     She has a history of pain in the left side of the neck and the lower back.  She has previously responded nicely to injection therapy with medial branch blocks to the lumbar and cervical region.  Both diagnostic and confirmatory blocks were done with good relief.  This was done in July and August 2019.  She has done physical therapy without relief and continues to do home exercise program without relief.  She has undergone diagnostic medial branch blocks for the left cervical and lumbar spine.  Due to aphasia, her pain diary is difficult.  Her spouse tells me that she was happy, moving better and not complaining of her usual pain for the duration of the anesthetic.  This was about 3 hours for the neck and more than 6 hours for the lower back.  Her pain has since returned.  Due to the number of years since the last 1 was done,Shad repeat MRI of the cervical spine looking for any other causes for her pain.     1. Multilevel mild cervical spondylosis most apparent at C5-6 where  there is mild right-sided neural foraminal stenosis.  2. 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 This may also be important if  any surgery or intervention is planned.     I have personally reviewed the examination and initial interpretation  and I agree with the findings.     ANGEL BARKER MD      If her back and neck pain return, we could repeat the medial branch blocks from C2-C5 including the third occipital nerves on the left side and do diagnostic and confirmatory test and if she gets good relief from this, she would  be a candidate for radiofrequency ablations.  Due to her significant spasticity and stroke, she is not a candidate for the conventional physical therapy.  This may have to be done if insurance insists.  In the past she has had good relief with just the medial branch blocks and did not proceed to radiofrequency ablations.        She reports her pain and  function is better and she is more independent now.  She has history of sadness and is doing quite well currently and  is tolerating her antidepressant.        Past Medical History           Past Medical History:   Diagnosis Date    Acute ischemic left MCA stroke (H) 3/22/15    Essential hypertension, benign           Past Surgical History             Past Surgical History:   Procedure Laterality Date    NO HISTORY OF SURGERY             Current Outpatient Medications   Medication Sig Dispense Refill    aspirin (ASA) 325 MG tablet TAKE 1 TABLET BY MOUTH EVERY DAY 90 tablet 3    azelastine (OPTIVAR) 0.05 % ophthalmic solution Place 1 drop into both eyes 2 times daily as needed (for itchy water eyes) 5 mL 1    baclofen (LIORESAL) 20 MG tablet Take 1 tablet (20 mg) by mouth 3 times daily 270 tablet 3    escitalopram (LEXAPRO) 10 MG tablet Take 1 tablet (10 mg) by mouth daily 90 tablet 3    gabapentin (NEURONTIN) 400 MG capsule Take 1 capsule (400 mg) by mouth 3 times daily for arm mobility. 270 capsule 3    hydroCHLOROthiazide 12.5 MG tablet Take 1 tablet (12.5 mg) by mouth daily as needed for blood pressure. 90 tablet 1    losartan (COZAAR) 50 MG tablet Take 1 tablet (50 mg) by mouth daily as needed for blood pressure. 90 tablet 1    metoprolol succinate ER (TOPROL XL) 50 MG 24 hr tablet Take 1 tablet (50 mg) by mouth daily for blood pressure. 90 tablet 1    nabumetone (RELAFEN) 500 MG tablet Take 1 tablet (500 mg) by mouth 2 times daily (with meals) as needed for pain in arm or hip. 180 tablet 3    olopatadine (PATANOL) 0.1 % ophthalmic solution Place 1 drop into both  eyes 2 times daily 5 mL 11    pantoprazole (PROTONIX) 40 MG EC tablet TAKE 1 TABLET (40 MG) BY MOUTH DAILY FOR REFLUX. 90 tablet 1    senna-docusate (SENEXON-S) 8.6-50 MG tablet TAKE 1 TABLET BY MOUTH DAILY AS NEEDED FOR CONSTIPATION. 30 tablet 1    simvastatin (ZOCOR) 20 MG tablet Take 1 tablet (20 mg) by mouth every evening for cholesterol. 90 tablet 1    VITAMIN D3 25 MCG (1000 UT) tablet TAKE 1 TABLET BY MOUTH EVERY DAY 90 tablet 0       LMP  (LMP Unknown)       On examination, the patient is in her manual wheelchair.  She transfers with assist of her . She has involvement of  right pectoralis major, right biceps brachii, flexor carpi ulnaris and flexor carpi radialis, flexor digitorum superficialis and flexor digitorum profundus.  She has increased tone in the flexor pollicis longus and  lumbricals. In the lower extremity . EHL. FDL and gastrocnemius are tight.  She is also tender over the left side of the neck where she is complaining of pain currently including the occipital region.  The right side is nontender.  The low back is nontender.     Impression: Right spastic hemiparesis, right side dominant,  torticollis, gait abnormality and spasm of muscles due to cerebrovascular accident.  Cervical spondylosis on the left side.     Based on today's assessment, she would benefit from 600 units of Botox injections.  I will see her in follow-up in about a month's time to see how she is responding and plan future treatments every 12 weeks.     20 minutes, exclusive of procedure, for the evaluation and management portion of the visit, greater than 50% was for CC.     Procedure note: With her informed consent, after explaining the benefits and risks of the procedure, and using Betasept for skin prep, EMG for localization, preservative-free normal saline for dilution. 1:1. , 600 units of Botox, 100 units were injected into the right pectoralis major,  25 needs to right flexor pollicis longus, 50 unis for  injected into the following muscles lumbricals, 50 units into flexor carpi radialis, 50 units into flexor digitorum Superficialis, flexor digitorum profundus.  75 units each were injected into the biceps brachii,  50 units into FDL, EHL and 100 units into the gastrocnemius.. 600 units were utilized in all.  She tolerated it well.  She will use ice, Tylenol as needed.     Cheng Jean MD

## 2024-09-19 NOTE — LETTER
9/19/2024       RE: Anita Bennett  [address]  [place] [address]     Dear Colleague,    Thank you for referring your patient, Anita Bennett, to the [facility] PHYSICAL MEDICINE AND [facility]ABIL[facility]ATION [facility]INIC [place] at [facility]. Please see a copy of my visit note below.    The patient returns for a follow-up visit for her ongoing issues related to spastic hemiparesis affecting the right side.     She has been  seen by me previously at [facility].      Last seen here on 06/27/24 Botox done helped. No falls. Feeling positive. Tolerating activities.     She has a history of pain in the left side of the neck and the lower back.  She has previously responded nicely to injection therapy with medial branch blocks to the lumbar and cervical region.  Both diagnostic and confirmatory blocks were done with good relief.  This was done in July and August 2019.  She has done physical therapy without relief and continues to do home exercise program without relief.  She has undergone diagnostic medial branch blocks for the left cervical and lumbar spine.  Due to aphasia, her pain diary is difficult.  Her spouse tells me that she was happy, moving better and not complaining of her usual pain for the duration of the anesthetic.  This was about 3 hours for the neck and more than 6 hours for the lower back.  Her pain has since returned.  Due to the number of years since the last 1 was done,Shad repeat MRI of the cervical spine looking for any other causes for her pain.     1. Multilevel mild cervical spondylosis most apparent at C5-6 where  there is mild right-sided neural foraminal stenosis.  2. 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 This may also be important if  any surgery or intervention is planned.     I have personally  reviewed the examination and initial interpretation  and I agree with the findings.     ANGEL BARKER MD      If her back and neck pain return, we could repeat the medial branch blocks from C2-C5 including the third occipital nerves on the left side and do diagnostic and confirmatory test and if she gets good relief from this, she would be a candidate for radiofrequency ablations.  Due to her significant spasticity and stroke, she is not a candidate for the conventional physical therapy.  This may have to be done if insurance insists.  In the past she has had good relief with just the medial branch blocks and did not proceed to radiofrequency ablations.        She reports her pain and  function is better and she is more independent now.  She has history of sadness and is doing quite well currently and  is tolerating her antidepressant.        Past Medical History           Past Medical History:   Diagnosis Date     Acute ischemic left MCA stroke (H) 3/22/15     Essential hypertension, benign           Past Surgical History             Past Surgical History:   Procedure Laterality Date     NO HISTORY OF SURGERY             Current Outpatient Medications   Medication Sig Dispense Refill     aspirin (ASA) 325 MG tablet TAKE 1 TABLET BY MOUTH EVERY DAY 90 tablet 3     azelastine (OPTIVAR) 0.05 % ophthalmic solution Place 1 drop into both eyes 2 times daily as needed (for itchy water eyes) 5 mL 1     baclofen (LIORESAL) 20 MG tablet Take 1 tablet (20 mg) by mouth 3 times daily 270 tablet 3     escitalopram (LEXAPRO) 10 MG tablet Take 1 tablet (10 mg) by mouth daily 90 tablet 3     gabapentin (NEURONTIN) 400 MG capsule Take 1 capsule (400 mg) by mouth 3 times daily for arm mobility. 270 capsule 3     hydroCHLOROthiazide 12.5 MG tablet Take 1 tablet (12.5 mg) by mouth daily as needed for blood pressure. 90 tablet 1     losartan (COZAAR) 50 MG tablet Take 1 tablet (50 mg) by mouth daily as needed for blood pressure. 90  tablet 1     metoprolol succinate ER (TOPROL XL) 50 MG 24 hr tablet Take 1 tablet (50 mg) by mouth daily for blood pressure. 90 tablet 1     nabumetone (RELAFEN) 500 MG tablet Take 1 tablet (500 mg) by mouth 2 times daily (with meals) as needed for pain in arm or hip. 180 tablet 3     olopatadine (PATANOL) 0.1 % ophthalmic solution Place 1 drop into both eyes 2 times daily 5 mL 11     pantoprazole (PROTONIX) 40 MG EC tablet TAKE 1 TABLET (40 MG) BY MOUTH DAILY FOR REFLUX. 90 tablet 1     senna-docusate (SENEXON-S) 8.6-50 MG tablet TAKE 1 TABLET BY MOUTH DAILY AS NEEDED FOR CONSTIPATION. 30 tablet 1     simvastatin (ZOCOR) 20 MG tablet Take 1 tablet (20 mg) by mouth every evening for cholesterol. 90 tablet 1     VITAMIN D3 25 MCG (1000 UT) tablet TAKE 1 TABLET BY MOUTH EVERY DAY 90 tablet 0       LMP  (LMP Unknown)       On examination, the patient is in her manual wheelchair.  She transfers with assist of her . She has involvement of  right pectoralis major, right biceps brachii, flexor carpi ulnaris and flexor carpi radialis, flexor digitorum superficialis and flexor digitorum profundus.  She has increased tone in the flexor pollicis longus and  lumbricals. In the lower extremity . EHL. FDL and gastrocnemius are tight.  She is also tender over the left side of the neck where she is complaining of pain currently including the occipital region.  The right side is nontender.  The low back is nontender.     Impression: Right spastic hemiparesis, right side dominant,  torticollis, gait abnormality and spasm of muscles due to cerebrovascular accident.  Cervical spondylosis on the left side.     Based on today's assessment, she would benefit from 600 units of Botox injections.  I will see her in follow-up in about a month's time to see how she is responding and plan future treatments every 12 weeks.     20 minutes, exclusive of procedure, for the evaluation and management portion of the visit, greater than 50% was  for CC.     Procedure note: With her informed consent, after explaining the benefits and risks of the procedure, and using Betasept for skin prep, EMG for localization, preservative-free normal saline for dilution. 1:1. , 600 units of Botox, 100 units were injected into the right pectoralis major,  25 needs to right flexor pollicis longus, 50 unis for injected into the following muscles lumbricals, 50 units into flexor carpi radialis, 50 units into flexor digitorum Superficialis, flexor digitorum profundus.  75 units each were injected into the biceps brachii,  50 units into FDL, EHL and 100 units into the gastrocnemius.. 600 units were utilized in all.  She tolerated it well.  She will use ice, Tylenol as needed.     Cheng Jean MD       Again, thank you for allowing me to participate in the care of your patient.      Sincerely,    Cheng Jean MD

## 2024-09-20 ENCOUNTER — TELEPHONE (OUTPATIENT)
Dept: FAMILY MEDICINE | Facility: CLINIC | Age: 64
End: 2024-09-20
Payer: MEDICARE

## 2024-09-20 NOTE — TELEPHONE ENCOUNTER
Forms/Letter Request    Type of form/letter: OTHER: ICD-10 code form--Diagnosis Verification       Do we have the form/letter: Yes:Mailed in    Who is the form from? [facility] (if other please explain)    Where did/will the form come from? form was faxed in    When is form/letter needed by: ASAP    How would you like the form/letter returned: Fax : [phone]    Patient Notified form requests are processed in 5-7 business days:Yes    Could we send this information to you in [employer] or would you prefer to receive a phone call?:   Patient would prefer a phone call   Okay to leave a detailed message?: Yes at Cell number on file:    Telephone Information:   Mobile [phone]

## 2024-09-23 ENCOUNTER — OFFICE VISIT (OUTPATIENT)
Dept: FAMILY MEDICINE | Facility: CLINIC | Age: 64
End: 2024-09-23
Payer: MEDICARE

## 2024-09-23 VITALS
TEMPERATURE: 97.6 F | HEART RATE: 63 BPM | DIASTOLIC BLOOD PRESSURE: 75 MMHG | BODY MASS INDEX: 29.45 KG/M2 | HEIGHT: 60 IN | RESPIRATION RATE: 18 BRPM | SYSTOLIC BLOOD PRESSURE: 131 MMHG | OXYGEN SATURATION: 94 %

## 2024-09-23 DIAGNOSIS — Z12.4 CERVICAL CANCER SCREENING: ICD-10-CM

## 2024-09-23 DIAGNOSIS — Z23 NEED FOR VACCINATION: ICD-10-CM

## 2024-09-23 DIAGNOSIS — Z28.21 VACCINATION NOT CARRIED OUT BECAUSE OF PATIENT REFUSAL: ICD-10-CM

## 2024-09-23 DIAGNOSIS — Z86.73 HISTORY OF CVA (CEREBROVASCULAR ACCIDENT): ICD-10-CM

## 2024-09-23 DIAGNOSIS — I69.920 APHASIA, LATE EFFECT OF CEREBROVASCULAR DISEASE: ICD-10-CM

## 2024-09-23 DIAGNOSIS — Z00.00 ENCOUNTER FOR MEDICARE ANNUAL WELLNESS EXAM: Primary | ICD-10-CM

## 2024-09-23 DIAGNOSIS — Z11.51 ENCOUNTER FOR SCREENING FOR HUMAN PAPILLOMAVIRUS (HPV): ICD-10-CM

## 2024-09-23 DIAGNOSIS — I10 ESSENTIAL HYPERTENSION WITH GOAL BLOOD PRESSURE LESS THAN 140/90: ICD-10-CM

## 2024-09-23 LAB
ALT SERPL W P-5'-P-CCNC: 20 U/L (ref 0–50)
ANION GAP SERPL CALCULATED.3IONS-SCNC: 9 MMOL/L (ref 7–15)
AST SERPL W P-5'-P-CCNC: 28 U/L (ref 0–45)
BUN SERPL-MCNC: 11.1 MG/DL (ref 8–23)
CALCIUM SERPL-MCNC: 9 MG/DL (ref 8.8–10.4)
CHLORIDE SERPL-SCNC: 106 MMOL/L (ref 98–107)
CHOLEST SERPL-MCNC: 143 MG/DL
CREAT SERPL-MCNC: 1.01 MG/DL (ref 0.51–0.95)
EGFRCR SERPLBLD CKD-EPI 2021: 62 ML/MIN/1.73M2
FASTING STATUS PATIENT QL REPORTED: YES
FASTING STATUS PATIENT QL REPORTED: YES
GLUCOSE SERPL-MCNC: 96 MG/DL (ref 70–99)
HCO3 SERPL-SCNC: 25 MMOL/L (ref 22–29)
HDLC SERPL-MCNC: 35 MG/DL
LDLC SERPL CALC-MCNC: 77 MG/DL
NONHDLC SERPL-MCNC: 108 MG/DL
POTASSIUM SERPL-SCNC: 4.2 MMOL/L (ref 3.4–5.3)
SODIUM SERPL-SCNC: 140 MMOL/L (ref 135–145)
TRIGL SERPL-MCNC: 156 MG/DL

## 2024-09-23 PROCEDURE — 80061 LIPID PANEL: CPT | Performed by: FAMILY MEDICINE

## 2024-09-23 PROCEDURE — G0008 ADMIN INFLUENZA VIRUS VAC: HCPCS | Performed by: FAMILY MEDICINE

## 2024-09-23 PROCEDURE — 87624 HPV HI-RISK TYP POOLED RSLT: CPT | Performed by: FAMILY MEDICINE

## 2024-09-23 PROCEDURE — 36415 COLL VENOUS BLD VENIPUNCTURE: CPT | Performed by: FAMILY MEDICINE

## 2024-09-23 PROCEDURE — 90673 RIV3 VACCINE NO PRESERV IM: CPT | Performed by: FAMILY MEDICINE

## 2024-09-23 PROCEDURE — 84460 ALANINE AMINO (ALT) (SGPT): CPT | Performed by: FAMILY MEDICINE

## 2024-09-23 PROCEDURE — G0145 SCR C/V CYTO,THINLAYER,RESCR: HCPCS | Performed by: FAMILY MEDICINE

## 2024-09-23 PROCEDURE — 99214 OFFICE O/P EST MOD 30 MIN: CPT | Mod: 25 | Performed by: FAMILY MEDICINE

## 2024-09-23 PROCEDURE — 84450 TRANSFERASE (AST) (SGOT): CPT | Performed by: FAMILY MEDICINE

## 2024-09-23 PROCEDURE — 80048 BASIC METABOLIC PNL TOTAL CA: CPT | Performed by: FAMILY MEDICINE

## 2024-09-23 PROCEDURE — G0439 PPPS, SUBSEQ VISIT: HCPCS | Performed by: FAMILY MEDICINE

## 2024-09-23 RX ORDER — METOPROLOL SUCCINATE 50 MG/1
50 TABLET, EXTENDED RELEASE ORAL DAILY
Qty: 90 TABLET | Refills: 1 | Status: SHIPPED | OUTPATIENT
Start: 2024-09-23

## 2024-09-23 RX ORDER — LOSARTAN POTASSIUM 50 MG/1
50 TABLET ORAL DAILY PRN
Qty: 90 TABLET | Refills: 1 | Status: SHIPPED | OUTPATIENT
Start: 2024-09-23

## 2024-09-23 RX ORDER — HYDROCHLOROTHIAZIDE 12.5 MG/1
12.5 TABLET ORAL DAILY PRN
Qty: 90 TABLET | Refills: 1 | Status: SHIPPED | OUTPATIENT
Start: 2024-09-23

## 2024-09-23 RX ORDER — SIMVASTATIN 20 MG
20 TABLET ORAL EVERY EVENING
Qty: 90 TABLET | Refills: 1 | Status: SHIPPED | OUTPATIENT
Start: 2024-09-23

## 2024-09-23 SDOH — HEALTH STABILITY: PHYSICAL HEALTH: ON AVERAGE, HOW MANY DAYS PER WEEK DO YOU ENGAGE IN MODERATE TO STRENUOUS EXERCISE (LIKE A BRISK WALK)?: 0 DAYS

## 2024-09-23 ASSESSMENT — PATIENT HEALTH QUESTIONNAIRE - PHQ9
SUM OF ALL RESPONSES TO PHQ QUESTIONS 1-9: 0
SUM OF ALL RESPONSES TO PHQ QUESTIONS 1-9: 0
10. IF YOU CHECKED OFF ANY PROBLEMS, HOW DIFFICULT HAVE THESE PROBLEMS MADE IT FOR YOU TO DO YOUR WORK, TAKE CARE OF THINGS AT HOME, OR GET ALONG WITH OTHER PEOPLE: NOT DIFFICULT AT ALL

## 2024-09-23 ASSESSMENT — PAIN SCALES - GENERAL: PAINLEVEL: NO PAIN (0)

## 2024-09-23 ASSESSMENT — SOCIAL DETERMINANTS OF HEALTH (SDOH): HOW OFTEN DO YOU GET TOGETHER WITH FRIENDS OR RELATIVES?: NEVER

## 2024-09-23 NOTE — TELEPHONE ENCOUNTER
Form was completed by provider during 9/23/24 visit. Form faxed to [facility] [phone] on 9/23/24 at 12:26pm. Copy placed in abstract and original in tc copy bin.

## 2024-09-23 NOTE — PATIENT INSTRUCTIONS
At [facility], we strive to deliver an exceptional experience to you, every time we see you. If you receive a survey, please let us know what we are doing well and/or what we could improve upon, as we do value your feedback.  If you have MyChart, you can expect to receive results automatically within 24 hours of their completion.  Your provider will send a note interpreting your results as well.   If you do not have MyChart, you should receive your results in about a week by mail.    Your care team:                            Family Medicine Internal Medicine   MD Sánchez Miller, MD Tamra Mauricio, MD Storm Archibald, MD Sneha Holm, PALianneC    Gregory Osborne, MD Pediatrics   Rafaela Wetzel, MD Stacey Gallegos, MD Amaya Choudhury, APRN CNP Marleny Umaña APRN CNP   Kailyn Gambino, MD Radha Romero, MD Maryann Shi, CNP     Kevin Zamora, CNP Same-Day Provider (No follow-up visits)   ELKIN Salazar, DNP Una Coombs, ELKIN Hayes, FNP, BC JARRED HortonC     Clinic hours: Monday - Thursday 7 am-6 pm; Fridays 7 am-5 pm.   Urgent care: Monday - Friday 10 am- 8 pm; Saturday and Sunday 9 am-5 pm.    Clinic: [phone]       [facility]: Monday - Thursday 8 am - 7 pm; Friday 8 am - 6 pm  [facility]: [phone]     Patient Education   L?i Khuyên Latrice Sóc D? Phòng  Ðây là l?i khuyên brown chúng tôi thu?ng garo ra d? giúp m?i gavin?i kh?e m?nh. Nhóm latrice sóc c?a quý v? có th? có l?i khuyên c? th? dành rilina cho quý v?. Vui lòng trao d?i v?i nhóm latrice sóc v? wily c?u latrice sóc d? phòng c?a chính quý v?.  L?i s?ng  T?p th? d?c ít nh?t 150 phút m?i tu?n (30 phút m?i ngày, 5 ngày m?t tu?n).  Th?c hi?n các ho?t d?ng troncoso cu?ng co 2 ngày m?t tu?n. Ði?u này s? giúp quý v? ki?m soát cân n?ng và luan ng?a b?nh t?t.  Không hút thu?c.  Thoa suleman ch?ng n?ng d? luan ng?a sudha thu  "da.  Ki?m tra m?c d? radon william nhà t? 2 d?n 5 nam m?t l?n. Radon là m?t lo?i khí không màu, không mùi có th? gây h?i cho ph?i c?a quý v?. Ð? tìm hi?u thêm, hãy truy c?p [email or web address]. và tìm ki?m \"Radon in Homes\" (Radon william nhà).  Gi? súng không n?p d?n và khóa l?i d? ? mindy an toàn wily két s?t ho?c h?m ch?a súng, ho?c s? d?ng khóa súng và c?t chìa khóa di. Luôn khóa và c?t d?n ? ch? riêng. Ð? tìm hi?u thêm, hãy truy c?p [email or web address] và tìm ki?m \"safe gun storage\" (c?t gi? súng an toàn).  Raman du?ng  An ít nh?t 5 kh?u ph?n trái cây và checo qu? m?i ngày.  Hãy th? bánh mì lúa mì, g?o l?t và mì ?ng nguyên h?t (thay vì bánh mì tr?ng, g?o và mì ?ng).  N?p d? canxi và vitamin D. Ki?m tra nhãn trên th?c ph?m và hu?ng t?i 100% anne RDA (m?c tiêu th? hàng ngày du?c khuy?n ngh?).  Khám d?nh k?  Khám và v? sinh rang mi?ng 6 tháng m?t l?n.  G?p nhóm latrice sóc s?c kh?e c?a quý v? hàng namd? trao d?i v?:  B?t k? thay d?i nào v? s?c kh?e c?a quý v?.  B?t k? lo?i thu?c nào mà nhóm latrice sóc c?a quý v? dã kê don.  Latrice sóc d? phòng, k? ho?ch hóa harjeet dình và cách luan ng?a các b?nh mãn tính.  Tiêm ch?ng (v?c-leanna)   Tiêm phòng HPV (d?n 26 tu?i), n?u quý v? michael t?ng tiêm lo?i v?c-leanna này donell?c dó.  Tiêm phòng viêm steffanie B (d?n 59 tu?i), n?u quý v? michael t?ng tiêm lo?i v?c-leanna này donell?c dó.  Tiêm phòng COVID-19: Tiêm v?c-leanna này khi d?n h?n.  Tiêm phòng cúm: Tiêm phòng cúm hàng nam.  Tiêm phòng u?n ván: Tiêm phòng u?n ván 10 nam m?t l?n.  Tiêm phòng ph? c?u khu?n, viêm steffanie A và RSV: Hãy h?i nhóm latrice sóc c?a quý v? xem li?u quý v? có c?n nh?ng ventura tiêm này hay không d?a trên nguy co rose?m b?nh c?a quý v?.  Tiêm phòng Jessica th?n kinh (dành cho tu?i t? 50 tr? lên).  Xét cele?m s?c kh?e t?ng quát  Sàng l?c b?nh ti?u du?ng:  B?t d?u t? tu?i 35, Khám sàng l?c b?nh ti?u du?ng ít nh?t 3 nam m?t l?n.  N?u quý v? du?i 35 tu?i, hãy h?i nhóm latrice sóc xem quý v? có nên sàng l?c b?nh ti?u du?ng hay không.  Xét cele?m cholesterol: T? tu?i 39, b?t " d?u xét cele?m cholesterol 5 nam m?t l?n, ho?c thu?ng xuyên hon n?u du?c khuy?n ngh?.  Ðo m?t d? xuong (DEXA): ? tu?i 50, hãy h?i nhóm asha sóc c?a quý v? xem quý v? có nên th?c hi?n xét cele?m này d? phát hi?n b?nh loãng xuong (xuong giòn) hay không.  Viêm steffanie C: Ði xét cele?m ít nh?t m?t l?n jose d?i.  Khám sàng l?c phình d?ng m?ch ch? b?ng: Trao d?i v?i bác si c?a quý v? v? vi?c th?c hi?n sàng l?c này n?u quý v?:  Ðã t?ng hút thu?c; và  Là nam gi?i v? m?t sinh h?c; và  Jose d? tu?i t? 65 d?n 75.  STI (b?nh rose?m trùng emy du?ng tình d?c)  Alex?c 24 tu?i: Hãy h?i nhóm asha sóc c?a quý v? xem quý v? có nên khám sàng l?c STI hay không.  Gianna 24 tu?i: Sàng l?c STI n?u quý v? có nguy co m?c b?nh. Quý v? có nguy co m?c các b?nh STI (estela g?m c? HIV) n?u:  Quý v? có toni h? tình d?c tich c?c v?i hon m?t gavin?i.  Quý v? không s? d?ng estela klein lucero m?i lúc.  Quý v? ho?c b?n tình du?c ch?n doán m?c b?nh rose?m b?nh lây emy du?ng tình d?c.  N?u quý v? có nguy co rose?m HIV, hãy h?i savage thu?c PrEP d? luan ng?a HIV.  Ði xét cele?m HIV ít nh?t m?t l?n jose d?i, cho dù quý v? có nguy co rose?m HIV hay không.  Xét cele?m sàng l?c sudha thu  Sàng l?c sudha thu c? t? cung: N?u quý v? có c? t? cung, hãy b?t d?u làm xét cele?m sàng l?c sudha thu c? t? cung thu?ng xuyên t? tu?i 21. H?u h?t nh?ng gavin?i khám sàng l?c thu?ng xuyên v?i k?t qu? bình thu?ng d?u có th? ng?ng khám gianna 65 tu?i. Hãy trao d?i v? v?n d? này v?i bác si c?a quý v?.  Quét sudha thu vú (ch?p talib kyle?n vú): N?u quý v? có hay t?ng có vú, hãy b?t d?u ch?p talib kyle?n vú thu?ng xuyên b?t d?u t? tu?i 40. Ðây là quá trình quét d? ki?m tra sudha thu vú.  Sàng l?c sudha thu d?i tràng: C?n b?t d?u sàng l?c sudha thu d?i tràng t? tu?i 45.  Th?c hi?n xét clee?m n?i soi d?i tràng 10 nam m?t l?n (ho?c thu?ng xuyên hon n?u quý v? có nguy co m?c b?nh) Ho?c, hãy h?i nhà cung c?p c?a quý v? v? các xét cele?m phân wily xét cele?m FIT hàng nam ho?c xét cele?m Cologuard 3 nam m?t l?n.  Ð? tìm hi?u thêm v?  các tùy ch?n th? cele?m c?a quý v?, hãy truy c?p: [email or web address].  Ð? du?c h? tr? garo ra danyel?t d?nh, hãy truy c?p: [email or web address].  Xét cele?m sàng l?c sudha thu kyle?n ti?n li?t: N?u quý v? có kyle?n ti?n li?t và ? d? tu?i t? 55 d?n 69, hãy h?i bác si xem vi?c xét cele?m sàng l?c sudha thu kyle?n ti?n li?t hàng nam có dem l?i l?i ích gì cho quý v? hay không.  Sàng l?c sudha thu ph?i: N?u quý v? dã t?ng ho?c còn camejo hút thu?c và t? 50 d?n 80 tu?i, hãy h?i nhóm asha sóc c?a quý v? xem vi?c sàng l?c sudha thu ph?i thu?ng xuyên có phù h?p v?i quý v? hay không.    Ch? nh?m m?c dích britta kh?o. Không th? thay th? l?i khuyên c?a nhà cung c?p d?ch v? asha sóc s?c kh?e c?a quý v?. B?n danyel?n   2023 [facility].   B?o anny m?i danyel?n. Ðu?c dánabimael giá lâm sàng b?i [facility]. [email or web address] - REV 04/24.

## 2024-09-23 NOTE — PROGRESS NOTES
Preventive Care Visit  [facility]  Mulugeta Sharma MD, Family Medicine  Sep 23, 2024      Assessment & Plan     (I10) Essential hypertension with goal blood pressure less than 140/90  (primary encounter diagnosis)  Comment:   Plan: BASIC METABOLIC PANEL, hydroCHLOROthiazide 12.5        MG tablet, losartan (COZAAR) 50 MG tablet,         metoprolol succinate ER (TOPROL XL) 50 MG 24 hr        tablet, Basic metabolic panel         Return in about 6 months (around 3/23/2025) for cholesterol, blood pressure check.      (Z00.00) Encounter for Medicare annual wellness exam  Comment:   Plan: HPV and Gynecologic Cytology Panel -         Recommended Age 30 - 65 Years, INFLUENZA         VACCINE TRIVALENT(FLUBLOK), Gynecologic         Cytology (PAP)        Follow-up in 1 year    (Z86.73) History of CVA (cerebrovascular accident)  Comment: She needs a replacement the brace for her foot drop  Plan: simvastatin (ZOCOR) 20 MG tablet, Lipid panel         reflex to direct LDL Non-fasting, ALT, AST,         Ankle/Foot Bracing Supplies Order Ankle Brace;         Right         Return in about 6 months (around 3/23/2025) for cholesterol, blood pressure check.      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      (Z12.4) Cervical cancer screening  (Z11.51) Encounter for screening for human papillomavirus (HPV)  Comment:   Plan: HPV and Gynecologic Cytology Panel -         Recommended Age 30 - 65 Years, Gynecologic         Cytology (PAP)            (Z23) Need for vaccination  Comment:   Plan: INFLUENZA VACCINE TRIVALENT(FLUBLOK)            (Z28.21) Vaccination not carried out because of patient refusal  Comment: COVID  Plan:             BMI  Estimated body mass index is 29.45 kg/m  as  calculated from the following:    Height as of this encounter: 1.524 m (5').    Weight as of 3/21/24: 68.4 kg (150 lb 12.8 oz).       Counseling  Appropriate preventive services were addressed with this patient via screening, questionnaire, or discussion as appropriate for fall prevention, nutrition, physical activity, Tobacco-use cessation, social engagement, weight loss and cognition.  Checklist reviewing preventive services available has been given to the patient.          Subjective   Anita is a 64 year old, presenting for the following:  Physical        9/23/2024     9:56 AM   Additional Questions   Roomed by Jocelyn   Accompanied by Spouse         Health Care Directive  Patient does not have a Health Care Directive or Living Will: Discussed advance care planning with patient; information given to patient to review.    HPI              9/23/2024   General Health   How would you rate your overall physical health? (!) FAIR   Feel stress (tense, anxious, or unable to sleep) Only a little      (!) STRESS CONCERN      9/23/2024   Nutrition   Diet: Low salt    Breakfast skipped       Multiple values from one day are sorted in reverse-chronological order         9/23/2024   Exercise   Days per week of moderate/strenous exercise 0 days      (!) EXERCISE CONCERN      9/23/2024   Social Factors   Frequency of gathering with friends or relatives Never   Worry food won't last until get money to buy more Patient declined   Food not last or not have enough money for food? Patient declined   Do you have housing? (Housing is defined as stable permanent housing and does not include staying ouside in a car, in a tent, in an abandoned building, in an overnight shelter, or couch-surfing.) Patient declined   Are you worried about losing your housing? Patient declined   Lack of transportation? Patient declined   Unable to get utilities (heat,electricity)? Patient declined      (!) SOCIAL CONNECTIONS CONCERN      9/23/2024   Fall Risk    Fallen 2 or more times in the past year? Screen not completed for medical reason(s)    No   Trouble with walking or balance? Yes       Multiple values from one day are sorted in reverse-chronological order          9/23/2024   Activities of Daily Living- Home Safety   Needs help with the following daily activites Transportation    Preparing meals    Housework    Bathing    Laundry    Medication administration    Toileting    Dressing   Safety concerns in the home None of the above       Multiple values from one day are sorted in reverse-chronological order         9/23/2024   Dental   Dentist two times every year? (!) NO            9/23/2024   Hearing Screening   Hearing concerns? None of the above            9/23/2024   Driving Risk Screening   Patient/family members have concerns about driving (!) DECLINE            9/23/2024   General Alertness/Fatigue Screening   Have you been more tired than usual lately? No            9/23/2024   Urinary Incontinence Screening   Bothered by leaking urine in past 6 months No            9/23/2024   TB Screening   Were you born outside of the [place]? Yes          Today's PHQ-9 Score:       9/23/2024     9:45 AM   PHQ-9 SCORE   PHQ-9 Total Score MyChart 0   PHQ-9 Total Score 0         9/23/2024   Substance Use   Alcohol more than 3/day or more than 7/wk No   Do you have a current opioid prescription? No   How severe/bad is pain from 1 to 10? 0/10 (No Pain)   Do you use any other substances recreationally? No        Social History     Tobacco Use    Smoking status: Never    Smokeless tobacco: Never   Vaping Use    Vaping status: Never Used   Substance Use Topics    Alcohol use: Not Currently    Drug use: No           4/23/2024   LAST FHS-7 RESULTS   1st degree relative breast or ovarian cancer No   Any relative bilateral breast cancer No   Any male have breast cancer No   Any ONE woman have BOTH breast AND ovarian cancer No   Any woman with breast cancer before 50yrs No   2 or more  relatives with breast AND/OR ovarian cancer No   2 or more relatives with breast AND/OR bowel cancer No                 History of abnormal Pap smear: No - age 30- 64 PAP with HPV every 5 years recommended        Latest Ref Rng & Units 9/23/2024    10:19 AM 3/25/2019     2:05 PM 3/25/2019     1:49 PM   PAP / HPV   PAP  Negative for Intraepithelial Lesion or Malignancy (NILM)      PAP (Historical)    NIL    HPV 16 DNA Negative Negative  Negative     HPV 18 DNA Negative Negative  Negative     Other HR HPV Negative Negative  Negative       ASCVD Risk   The ASCVD Risk score (Shayla MACK, et al., 2019) failed to calculate for the following reasons:    The patient has a prior MI or stroke diagnosis            Reviewed and updated as needed this visit by Provider     Meds                  Current providers sharing in care for this patient include:  Patient Care Team:  Mulugeta Sharma MD as PCP - General (Family Practice)  Mulugeta Sharma MD as Assigned PCP  Usha Lorenzo MD as MD (Ophthalmology)  Usha Lorenzo MD as Assigned Surgical Provider    The following health maintenance items are reviewed in Epic and correct as of today:  Health Maintenance   Topic Date Due    COVID-19 Vaccine (5 - 2024-25 season) 09/01/2024    EYE EXAM  11/02/2024    PHQ-9  03/23/2025    COLORECTAL CANCER SCREENING  07/05/2025    MEDICARE ANNUAL WELLNESS VISIT  09/23/2025    BMP  09/23/2025    LIPID  09/23/2025    ANNUAL REVIEW OF HM ORDERS  09/23/2025    MAMMO SCREENING  04/23/2026    GLUCOSE  09/23/2027    ADVANCE CARE PLANNING  09/12/2028    HPV TEST  09/23/2029    PAP  09/23/2029    DTAP/TDAP/TD IMMUNIZATION (3 - Td or Tdap) 09/20/2031    HEPATITIS C SCREENING  Completed    HIV SCREENING  Completed    DEPRESSION ACTION PLAN  Completed    INFLUENZA VACCINE  Completed    ZOSTER IMMUNIZATION  Completed    RSV VACCINE  Completed    Pneumococcal Vaccine: Pediatrics (0 to 5 Years) and At-Risk Patients (6 to 64 Years)  Aged  Out    HPV IMMUNIZATION  Aged Out    MENINGITIS IMMUNIZATION  Aged Out    RSV MONOCLONAL ANTIBODY  Aged Out            Objective    Exam  /75   Pulse 63   Temp 97.6  F (36.4  C) (Temporal)   Resp 18   Ht 1.524 m (5')   LMP  (LMP Unknown)   SpO2 94%   BMI 29.45 kg/m     Estimated body mass index is 29.45 kg/m  as calculated from the following:    Height as of this encounter: 1.524 m (5').    Weight as of 3/21/24: 68.4 kg (150 lb 12.8 oz).    Physical Exam  GENERAL: alert and no distress  EYES: Eyes grossly normal to inspection, PERRL and conjunctivae and sclerae normal  HENT: ear canals and TM's normal, nose and mouth without ulcers or lesions  NECK: no adenopathy, no asymmetry, masses, or scars  RESP: lungs clear to auscultation - no rales, rhonchi or wheezes  CV: regular rate and rhythm, normal S1 S2, no S3 or S4, no murmur, click or rub, no peripheral edema  ABDOMEN: soft, nontender, no hepatosplenomegaly, no masses and bowel sounds normal   (female) w/bimanual: normal female external genitalia, normal urethral meatus, normal vaginal mucosa, and normal cervix/adnexa/uterus without masses or discharge  MS: no gross musculoskeletal defects noted, no edema  SKIN: no suspicious lesions or rashes  PSYCH: mentation appears normal, affect normal/bright        9/23/2024   Mini Cog   Mini-Cog Not Completed (choose reason) Language barrier and no  present                9/23/2024   Vision Screen   Reason Vision Screen Not Completed Parent/Patient declined - No  present          Signed Electronically by: Mulugeta Sharma MD    Answers submitted by the patient for this visit:  Patient Health Questionnaire (Submitted on 9/23/2024)  If you checked off any problems, how difficult have these problems made it for you to do your work, take care of things at home, or get along with other people?: Not difficult at all  PHQ9 TOTAL SCORE: 0

## 2024-09-24 LAB
HPV HR 12 DNA CVX QL NAA+PROBE: NEGATIVE
HPV16 DNA CVX QL NAA+PROBE: NEGATIVE
HPV18 DNA CVX QL NAA+PROBE: NEGATIVE
HUMAN PAPILLOMA VIRUS FINAL DIAGNOSIS: NORMAL

## 2024-09-26 LAB
BKR AP ASSOCIATED HPV REPORT: NORMAL
BKR LAB AP GYN ADEQUACY: NORMAL
BKR LAB AP GYN INTERPRETATION: NORMAL
BKR LAB AP PREVIOUS ABNORMAL: NORMAL
PATH REPORT.COMMENTS IMP SPEC: NORMAL
PATH REPORT.COMMENTS IMP SPEC: NORMAL
PATH REPORT.RELEVANT HX SPEC: NORMAL

## 2024-10-01 ENCOUNTER — DOCUMENTATION ONLY (OUTPATIENT)
Dept: FAMILY MEDICINE | Facility: CLINIC | Age: 64
End: 2024-10-01
Payer: MEDICARE

## 2024-10-01 DIAGNOSIS — M21.371 FOOT DROP, RIGHT: Primary | ICD-10-CM

## 2024-11-05 ENCOUNTER — OFFICE VISIT (OUTPATIENT)
Dept: OPHTHALMOLOGY | Facility: CLINIC | Age: 64
End: 2024-11-05
Payer: MEDICARE

## 2024-11-05 ENCOUNTER — VIRTUAL VISIT (OUTPATIENT)
Dept: INTERPRETER SERVICES | Facility: CLINIC | Age: 64
End: 2024-11-05

## 2024-11-05 DIAGNOSIS — H52.4 HYPEROPIA OF BOTH EYES WITH ASTIGMATISM AND PRESBYOPIA: ICD-10-CM

## 2024-11-05 DIAGNOSIS — H52.03 HYPEROPIA OF BOTH EYES WITH ASTIGMATISM AND PRESBYOPIA: ICD-10-CM

## 2024-11-05 DIAGNOSIS — H52.203 HYPEROPIA OF BOTH EYES WITH ASTIGMATISM AND PRESBYOPIA: ICD-10-CM

## 2024-11-05 DIAGNOSIS — H25.13 NUCLEAR SCLEROSIS OF BOTH EYES: Primary | ICD-10-CM

## 2024-11-05 DIAGNOSIS — H04.123 DRY EYES: ICD-10-CM

## 2024-11-05 DIAGNOSIS — H10.13 ALLERGIC CONJUNCTIVITIS, BILATERAL: ICD-10-CM

## 2024-11-05 PROCEDURE — T1013 SIGN LANG/ORAL INTERPRETER: HCPCS | Mod: U3

## 2024-11-05 PROCEDURE — 92014 COMPRE OPH EXAM EST PT 1/>: CPT | Performed by: STUDENT IN AN ORGANIZED HEALTH CARE EDUCATION/TRAINING PROGRAM

## 2024-11-05 PROCEDURE — 92015 DETERMINE REFRACTIVE STATE: CPT | Mod: GY | Performed by: STUDENT IN AN ORGANIZED HEALTH CARE EDUCATION/TRAINING PROGRAM

## 2024-11-05 RX ORDER — POLYETHYLENE GLYCOL 400 AND PROPYLENE GLYCOL 4; 3 MG/ML; MG/ML
1 SOLUTION/ DROPS OPHTHALMIC 4 TIMES DAILY PRN
Qty: 15 ML | Refills: 11 | Status: SHIPPED | OUTPATIENT
Start: 2024-11-05

## 2024-11-05 RX ORDER — OLOPATADINE HYDROCHLORIDE 1 MG/ML
1 SOLUTION/ DROPS OPHTHALMIC 2 TIMES DAILY
Qty: 5 ML | Refills: 11 | Status: SHIPPED | OUTPATIENT
Start: 2024-11-05

## 2024-11-05 ASSESSMENT — REFRACTION_MANIFEST
OS_AXIS: 175
OD_AXIS: 015
OS_SPHERE: +0.75
OD_SPHERE: +1.00
OS_CYLINDER: +0.75
OD_CYLINDER: +1.00

## 2024-11-05 ASSESSMENT — VISUAL ACUITY
CORRECTION_TYPE: GLASSES
OD_CC: 20/70
OS_CC: 20/50

## 2024-11-05 ASSESSMENT — REFRACTION_WEARINGRX
OS_AXIS: 150
OD_CYLINDER: +1.00
OD_AXIS: 034
OS_CYLINDER: +0.50
OD_SPHERE: +2.25
SPECS_TYPE: SVL DISTANCE
OS_SPHERE: +0.50

## 2024-11-05 ASSESSMENT — CONF VISUAL FIELD
OD_INFERIOR_TEMPORAL_RESTRICTION: 3
OS_SUPERIOR_NASAL_RESTRICTION: 3
OD_INFERIOR_NASAL_RESTRICTION: 3
OD_SUPERIOR_TEMPORAL_RESTRICTION: 3
OS_INFERIOR_NASAL_RESTRICTION: 3
OD_SUPERIOR_NASAL_RESTRICTION: 3

## 2024-11-05 ASSESSMENT — EXTERNAL EXAM - LEFT EYE: OS_EXAM: NORMAL

## 2024-11-05 ASSESSMENT — CUP TO DISC RATIO
OD_RATIO: 0.45
OS_RATIO: 0.4

## 2024-11-05 ASSESSMENT — TONOMETRY
OS_IOP_MMHG: 11
OD_IOP_MMHG: 13
IOP_METHOD: APPLANATION

## 2024-11-05 ASSESSMENT — EXTERNAL EXAM - RIGHT EYE: OD_EXAM: NORMAL

## 2024-11-05 NOTE — PATIENT INSTRUCTIONS
"Continue Patanol twice a day in both eyes as needed for itchiness    Use artificial tears 2-4 times a day (Refresh Optive, Systane Balance, or TheraTears. Avoid generic artificial tears or \"get the red out\" drops).     Glasses prescription given - recommend updating    Usha Lorenzo MD  [phone]   "

## 2024-11-05 NOTE — LETTER
"11/5/2024      Anita Bennett  [address]  [place] [address]      Dear Colleague,    Thank you for referring your patient, Anita Bennett, to the [facility]. Please see a copy of my visit note below.     Current Eye Medications:  was using Patanol both eyes prn, would like a refill     Subjective:  here for complete eye exam today. Worried that she has cataracts. Still seeing the TV ok at home.      Objective:  See Ophthalmology Exam.       Assessment:  Anita Bennett is a 64 year old female who presents with:   Encounter Diagnoses   Name Primary?     Nuclear sclerosis of both eyes - Both Eyes Patient doing ok with vision at present. Monitor.     Allergic conjunctivitis, bilateral      Hyperopia of both eyes with astigmatism and presbyopia      Dry eyes        Plan:  Continue Patanol twice a day in both eyes as needed for itchiness    Use artificial tears 2-4 times a day (Refresh Optive, Systane Balance, or TheraTears. Avoid generic artificial tears or \"get the red out\" drops).     Glasses prescription given - recommend updating    Usha Lorenzo MD  [phone]       Again, thank you for allowing me to participate in the care of your patient.        Sincerely,        Usha Lorenzo MD  "

## 2024-11-05 NOTE — PROGRESS NOTES
" Current Eye Medications:  was using Patanol both eyes prn, would like a refill     Subjective:  here for complete eye exam today. Worried that she has cataracts. Still seeing the TV ok at home.      Objective:  See Ophthalmology Exam.       Assessment:  Anita Bennett is a 64 year old female who presents with:   Encounter Diagnoses   Name Primary?    Nuclear sclerosis of both eyes - Both Eyes Patient doing ok with vision at present. Monitor.    Allergic conjunctivitis, bilateral     Hyperopia of both eyes with astigmatism and presbyopia     Dry eyes        Plan:  Continue Patanol twice a day in both eyes as needed for itchiness    Use artificial tears 2-4 times a day (Refresh Optive, Systane Balance, or TheraTears. Avoid generic artificial tears or \"get the red out\" drops).     Glasses prescription given - recommend updating    Usha Lorenzo MD  [phone]     "

## 2024-12-12 ENCOUNTER — OFFICE VISIT (OUTPATIENT)
Dept: PHYSICAL MEDICINE AND REHAB | Facility: CLINIC | Age: 64
End: 2024-12-12
Payer: MEDICARE

## 2024-12-12 DIAGNOSIS — G24.8 FOCAL DYSTONIA: ICD-10-CM

## 2024-12-12 DIAGNOSIS — G81.11 RIGHT SPASTIC HEMIPARESIS (H): Primary | ICD-10-CM

## 2024-12-12 DIAGNOSIS — M62.838 SPASM OF MUSCLE: ICD-10-CM

## 2024-12-12 DIAGNOSIS — R26.9 ABNORMAL GAIT: ICD-10-CM

## 2024-12-12 DIAGNOSIS — G89.29 OTHER CHRONIC PAIN: ICD-10-CM

## 2024-12-12 DIAGNOSIS — M25.511 RIGHT SHOULDER PAIN, UNSPECIFIED CHRONICITY: ICD-10-CM

## 2024-12-12 DIAGNOSIS — M79.601 RIGHT UPPER LIMB PAIN: ICD-10-CM

## 2024-12-12 RX ORDER — LIDOCAINE 4 G/G
1 PATCH TOPICAL EVERY 24 HOURS
Qty: 10 PATCH | Refills: 0 | Status: SHIPPED | OUTPATIENT
Start: 2024-12-12

## 2024-12-12 RX ORDER — NABUMETONE 500 MG/1
500 TABLET, FILM COATED ORAL 2 TIMES DAILY WITH MEALS
Qty: 180 TABLET | Refills: 3 | Status: SHIPPED | OUTPATIENT
Start: 2024-12-12

## 2024-12-12 NOTE — PROGRESS NOTES
The patient returns for a follow-up visit for her ongoing issues related to spastic hemiparesis affecting the right side.     She has been  seen by me previously at [facility] [place].      Last seen here on 9/19/24 Botox done helped. No falls. Feeling positive. Tolerating activities.  Wants Relafen refilled.     She has a history of pain in the left side of the neck and the lower back.  She has previously responded nicely to injection therapy with medial branch blocks to the lumbar and cervical region.  Both diagnostic and confirmatory blocks were done with good relief.  This was done in July and August 2019.  She has done physical therapy without relief and continues to do home exercise program without relief.  She has undergone diagnostic medial branch blocks for the left cervical and lumbar spine.  Due to aphasia, her pain diary is difficult.  Her spouse tells me that she was happy, moving better and not complaining of her usual pain for the duration of the anesthetic.  This was about 3 hours for the neck and more than 6 hours for the lower back.  Her pain has since returned.  Due to the number of years since the last 1 was done,Shad repeat MRI of the cervical spine looking for any other causes for her pain.     1. Multilevel mild cervical spondylosis most apparent at C5-6 where  there is mild right-sided neural foraminal stenosis.  2. 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 This may also be important if  any surgery or intervention is planned.     I have personally reviewed the examination and initial interpretation  and I agree with the findings.     ANGEL BARKER MD      If her back and neck pain return, we could repeat the medial branch blocks from C2-C5 including the third occipital nerves on the left side and do diagnostic and confirmatory test and if she gets good  relief from this, she would be a candidate for radiofrequency ablations.  Due to her significant spasticity and stroke, she is not a candidate for the conventional physical therapy.  This may have to be done if insurance insists.  In the past she has had good relief with just the medial branch blocks and did not proceed to radiofrequency ablations.        She reports her pain and  function is better and she is more independent now.  She has history of sadness and is doing quite well currently and  is tolerating her antidepressant.        Past Medical History           Past Medical History:   Diagnosis Date    Acute ischemic left MCA stroke (H) 3/22/15    Essential hypertension, benign           Past Surgical History             Past Surgical History:   Procedure Laterality Date    NO HISTORY OF SURGERY             Current Outpatient Medications   Medication Sig Dispense Refill    aspirin (ASA) 325 MG tablet TAKE 1 TABLET BY MOUTH EVERY DAY 90 tablet 3    baclofen (LIORESAL) 20 MG tablet Take 1 tablet (20 mg) by mouth 3 times daily 270 tablet 3    escitalopram (LEXAPRO) 10 MG tablet Take 1 tablet (10 mg) by mouth daily 90 tablet 3    gabapentin (NEURONTIN) 400 MG capsule Take 1 capsule (400 mg) by mouth 3 times daily for arm mobility. 270 capsule 3    hydroCHLOROthiazide 12.5 MG tablet Take 1 tablet (12.5 mg) by mouth daily as needed. for blood pressure. 90 tablet 1    losartan (COZAAR) 50 MG tablet Take 1 tablet (50 mg) by mouth daily as needed. for blood pressure. 90 tablet 1    metoprolol succinate ER (TOPROL XL) 50 MG 24 hr tablet Take 1 tablet (50 mg) by mouth daily. for blood pressure. 90 tablet 1    nabumetone (RELAFEN) 500 MG tablet Take 1 tablet (500 mg) by mouth 2 times daily (with meals) as needed for pain in arm or hip. 180 tablet 3    olopatadine (PATANOL) 0.1 % ophthalmic solution Place 1 drop into both eyes 2 times daily. 5 mL 11    pantoprazole (PROTONIX) 40 MG EC tablet TAKE 1 TABLET (40 MG) BY MOUTH  DAILY FOR REFLUX. 90 tablet 1    polyethylene glycol-propylene glycol (SYSTANE) 0.4-0.3 % SOLN ophthalmic solution Place 1 drop into both eyes 4 times daily as needed for dry eyes. 15 mL 11    senna-docusate (SENEXON-S) 8.6-50 MG tablet TAKE 1 TABLET BY MOUTH DAILY AS NEEDED FOR CONSTIPATION. 30 tablet 1    simvastatin (ZOCOR) 20 MG tablet Take 1 tablet (20 mg) by mouth every evening. for cholesterol. 90 tablet 1    VITAMIN D3 25 MCG (1000 UT) tablet TAKE 1 TABLET BY MOUTH EVERY DAY 90 tablet 0            LMP  (LMP Unknown)       On examination, the patient is in her manual wheelchair.  She transfers with assist of her . She has involvement of  right pectoralis major, right biceps brachii, flexor carpi ulnaris and flexor carpi radialis, flexor digitorum superficialis and flexor digitorum profundus.  She has increased tone in the flexor pollicis longus and  lumbricals. In the lower extremity . EHL. FDL and gastrocnemius are tight.  She is also tender over the left side of the neck where she is complaining of pain currently including the occipital region.  The right side is nontender.  The low back is nontender.     Impression: Right spastic hemiparesis, right side dominant,  torticollis, gait abnormality and spasm of muscles due to cerebrovascular accident.  Cervical spondylosis on the left side.     Based on today's assessment, she would benefit from 600 units of Botox injections.  I will see her in follow-up in about a month's time to see how she is responding and plan future treatments every 12 weeks.  Recommend using lidocaine patch for the palm to reduce the pain of injections.    20 minutes, exclusive of procedure, for the evaluation and management portion of the visit, greater than 50% was for CC.     Procedure note: With her informed consent, after explaining the benefits and risks of the procedure, and using Betasept for skin prep, EMG for localization, preservative-free normal saline for dilution.  1:1. , 600 units of Botox, 100 units were injected into the right pectoralis major,  25 needs to right flexor pollicis longus, 50 unis for injected into the following muscles lumbricals, 50 units into flexor carpi radialis, 50 units into flexor digitorum Superficialis, flexor digitorum profundus.  75 units each were injected into the biceps brachii,  50 units into FDL, EHL and 100 units into the gastrocnemius.. 600 units were utilized in all.  She tolerated it well.  She will use ice, Tylenol as needed.     Cheng Jean MD

## 2024-12-12 NOTE — LETTER
12/12/2024       RE: Anita Bennett  [address]  [place] [address]     Dear Colleague,    Thank you for referring your patient, Anita Bennett, to the [facility] PHYSICAL MEDICINE AND REHABILITATION CLINIC [place] at [facility]. Please see a copy of my visit note below.    The patient returns for a follow-up visit for her ongoing issues related to spastic hemiparesis affecting the right side.     She has been  seen by me previously at [facility] stroke [place].      Last seen here on 9/19/24 Botox done helped. No falls. Feeling positive. Tolerating activities.  Wants Relafen refilled.     She has a history of pain in the left side of the neck and the lower back.  She has previously responded nicely to injection therapy with medial branch blocks to the lumbar and cervical region.  Both diagnostic and confirmatory blocks were done with good relief.  This was done in July and August 2019.  She has done physical therapy without relief and continues to do home exercise program without relief.  She has undergone diagnostic medial branch blocks for the left cervical and lumbar spine.  Due to aphasia, her pain diary is difficult.  Her spouse tells me that she was happy, moving better and not complaining of her usual pain for the duration of the anesthetic.  This was about 3 hours for the neck and more than 6 hours for the lower back.  Her pain has since returned.  Due to the number of years since the last 1 was done,Shad repeat MRI of the cervical spine looking for any other causes for her pain.     1. Multilevel mild cervical spondylosis most apparent at C5-6 where  there is mild right-sided neural foraminal stenosis.  2. 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 This may also be important if  any surgery or intervention is  planned.     I have personally reviewed the examination and initial interpretation  and I agree with the findings.     ANGEL BARKER MD      If her back and neck pain return, we could repeat the medial branch blocks from C2-C5 including the third occipital nerves on the left side and do diagnostic and confirmatory test and if she gets good relief from this, she would be a candidate for radiofrequency ablations.  Due to her significant spasticity and stroke, she is not a candidate for the conventional physical therapy.  This may have to be done if insurance insists.  In the past she has had good relief with just the medial branch blocks and did not proceed to radiofrequency ablations.        She reports her pain and  function is better and she is more independent now.  She has history of sadness and is doing quite well currently and  is tolerating her antidepressant.        Past Medical History           Past Medical History:   Diagnosis Date     Acute ischemic left MCA stroke (H) 3/22/15     Essential hypertension, benign           Past Surgical History             Past Surgical History:   Procedure Laterality Date     NO HISTORY OF SURGERY             Current Outpatient Medications   Medication Sig Dispense Refill     aspirin (ASA) 325 MG tablet TAKE 1 TABLET BY MOUTH EVERY DAY 90 tablet 3     baclofen (LIORESAL) 20 MG tablet Take 1 tablet (20 mg) by mouth 3 times daily 270 tablet 3     escitalopram (LEXAPRO) 10 MG tablet Take 1 tablet (10 mg) by mouth daily 90 tablet 3     gabapentin (NEURONTIN) 400 MG capsule Take 1 capsule (400 mg) by mouth 3 times daily for arm mobility. 270 capsule 3     hydroCHLOROthiazide 12.5 MG tablet Take 1 tablet (12.5 mg) by mouth daily as needed. for blood pressure. 90 tablet 1     losartan (COZAAR) 50 MG tablet Take 1 tablet (50 mg) by mouth daily as needed. for blood pressure. 90 tablet 1     metoprolol succinate ER (TOPROL XL) 50 MG 24 hr tablet Take 1 tablet (50 mg) by mouth daily.  for blood pressure. 90 tablet 1     nabumetone (RELAFEN) 500 MG tablet Take 1 tablet (500 mg) by mouth 2 times daily (with meals) as needed for pain in arm or hip. 180 tablet 3     olopatadine (PATANOL) 0.1 % ophthalmic solution Place 1 drop into both eyes 2 times daily. 5 mL 11     pantoprazole (PROTONIX) 40 MG EC tablet TAKE 1 TABLET (40 MG) BY MOUTH DAILY FOR REFLUX. 90 tablet 1     polyethylene glycol-propylene glycol (SYSTANE) 0.4-0.3 % SOLN ophthalmic solution Place 1 drop into both eyes 4 times daily as needed for dry eyes. 15 mL 11     senna-docusate (SENEXON-S) 8.6-50 MG tablet TAKE 1 TABLET BY MOUTH DAILY AS NEEDED FOR CONSTIPATION. 30 tablet 1     simvastatin (ZOCOR) 20 MG tablet Take 1 tablet (20 mg) by mouth every evening. for cholesterol. 90 tablet 1     VITAMIN D3 25 MCG (1000 UT) tablet TAKE 1 TABLET BY MOUTH EVERY DAY 90 tablet 0            LMP  (LMP Unknown)       On examination, the patient is in her manual wheelchair.  She transfers with assist of her . She has involvement of  right pectoralis major, right biceps brachii, flexor carpi ulnaris and flexor carpi radialis, flexor digitorum superficialis and flexor digitorum profundus.  She has increased tone in the flexor pollicis longus and  lumbricals. In the lower extremity . EHL. FDL and gastrocnemius are tight.  She is also tender over the left side of the neck where she is complaining of pain currently including the occipital region.  The right side is nontender.  The low back is nontender.     Impression: Right spastic hemiparesis, right side dominant,  torticollis, gait abnormality and spasm of muscles due to cerebrovascular accident.  Cervical spondylosis on the left side.     Based on today's assessment, she would benefit from 600 units of Botox injections.  I will see her in follow-up in about a month's time to see how she is responding and plan future treatments every 12 weeks.  Recommend using lidocaine patch for the palm to  reduce the pain of injections.    20 minutes, exclusive of procedure, for the evaluation and management portion of the visit, greater than 50% was for CC.     Procedure note: With her informed consent, after explaining the benefits and risks of the procedure, and using Betasept for skin prep, EMG for localization, preservative-free normal saline for dilution. 1:1. , 600 units of Botox, 100 units were injected into the right pectoralis major,  25 needs to right flexor pollicis longus, 50 unis for injected into the following muscles lumbricals, 50 units into flexor carpi radialis, 50 units into flexor digitorum Superficialis, flexor digitorum profundus.  75 units each were injected into the biceps brachii,  50 units into FDL, EHL and 100 units into the gastrocnemius.. 600 units were utilized in all.  She tolerated it well.  She will use ice, Tylenol as needed.     Cheng Jean MD       Again, thank you for allowing me to participate in the care of your patient.      Sincerely,    Cheng Jean MD

## 2024-12-16 ENCOUNTER — TELEPHONE (OUTPATIENT)
Dept: PHYSICAL MEDICINE AND REHAB | Facility: CLINIC | Age: 64
End: 2024-12-16
Payer: MEDICARE

## 2024-12-16 NOTE — TELEPHONE ENCOUNTER
Called [facility] in [place]:    ----- Message -----  From: Cheng Jean MD  Sent: 12/16/2024  10:33 AM CST    OK to get OTC.    Cheng Jean MD    Spoke with the pharmacy technician who verbalized understanding and will let patient know that this is available over the counter and that patient is to buy it OTC.     No further questions or concerns at this time.    SANTOS PhillipsN RN  RN Care Coordinator for Physical Medicine and Rehabilitation  [facility] [address] [address]  Office: [phone]  Fax: [phone]

## 2025-03-06 ENCOUNTER — OFFICE VISIT (OUTPATIENT)
Dept: PHYSICAL MEDICINE AND REHAB | Facility: CLINIC | Age: 65
End: 2025-03-06
Payer: MEDICARE

## 2025-03-06 VITALS
SYSTOLIC BLOOD PRESSURE: 104 MMHG | RESPIRATION RATE: 16 BRPM | HEART RATE: 59 BPM | DIASTOLIC BLOOD PRESSURE: 64 MMHG | OXYGEN SATURATION: 98 %

## 2025-03-06 DIAGNOSIS — M62.838 SPASM OF MUSCLE: ICD-10-CM

## 2025-03-06 DIAGNOSIS — R26.9 ABNORMAL GAIT: ICD-10-CM

## 2025-03-06 DIAGNOSIS — G81.11 RIGHT SPASTIC HEMIPARESIS (H): Primary | ICD-10-CM

## 2025-03-06 DIAGNOSIS — G24.8 FOCAL DYSTONIA: ICD-10-CM

## 2025-03-06 NOTE — LETTER
3/6/2025       RE: Anita Bennett  [address]  [place] [address]     Dear Colleague,    Thank you for referring your patient, Anita Bennett, to the [facility] PHYSICAL MEDICINE AND REHABILITATION CLINIC [place] at [facility]. Please see a copy of my visit note below.    The patient returns for a follow-up visit for her ongoing issues related to spastic hemiparesis affecting the right side.     She has been  seen by me previously at [facility] stroke [place].      Last seen here on 12/12/2024.  Botox done helped. No falls. Feeling positive. Tolerating activities.  Does acknowledge some challenges with memory but is aware of her deficits.     She has a history of pain in the left side of the neck and the lower back.  She has previously responded nicely to injection therapy with medial branch blocks to the lumbar and cervical region.  Both diagnostic and confirmatory blocks were done with good relief.  This was done in July and August 2019.  She has done physical therapy without relief and continues to do home exercise program without relief.  She has undergone diagnostic medial branch blocks for the left cervical and lumbar spine.  Due to aphasia, her pain diary is difficult.  Her spouse tells me that she was happy, moving better and not complaining of her usual pain for the duration of the anesthetic.  This was about 3 hours for the neck and more than 6 hours for the lower back.  Her pain has since returned.  Due to the number of years since the last 1 was done,Shad repeat MRI of the cervical spine looking for any other causes for her pain.     1. Multilevel mild cervical spondylosis most apparent at C5-6 where  there is mild right-sided neural foraminal stenosis.  2. 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 This may also  be important if  any surgery or intervention is planned.     I have personally reviewed the examination and initial interpretation  and I agree with the findings.     ANGEL BARKER MD      If her back and neck pain return, we could repeat the medial branch blocks from C2-C5 including the third occipital nerves on the left side and do diagnostic and confirmatory test and if she gets good relief from this, she would be a candidate for radiofrequency ablations.  Due to her significant spasticity and stroke, she is not a candidate for the conventional physical therapy.  This may have to be done if insurance insists.  In the past she has had good relief with just the medial branch blocks and did not proceed to radiofrequency ablations.        She reports her pain and  function is better and she is more independent now.  She has history of sadness and is doing quite well currently and  is tolerating her antidepressant.        Past Medical History           Past Medical History:   Diagnosis Date     Acute ischemic left MCA stroke (H) 3/22/15     Essential hypertension, benign           Past Surgical History             Past Surgical History:   Procedure Laterality Date     NO HISTORY OF SURGERY             Current Outpatient Medications   Medication Sig Dispense Refill     aspirin (ASA) 325 MG tablet TAKE 1 TABLET BY MOUTH EVERY DAY 90 tablet 3     baclofen (LIORESAL) 20 MG tablet Take 1 tablet (20 mg) by mouth 3 times daily 270 tablet 3     escitalopram (LEXAPRO) 10 MG tablet Take 1 tablet (10 mg) by mouth daily 90 tablet 3     gabapentin (NEURONTIN) 400 MG capsule Take 1 capsule (400 mg) by mouth 3 times daily for arm mobility. 270 capsule 3     hydroCHLOROthiazide 12.5 MG tablet Take 1 tablet (12.5 mg) by mouth daily as needed. for blood pressure. 90 tablet 1     Lidocaine (LIDOCARE) 4 % Patch Place 1 patch over 12 hours onto the skin every 24 hours. To prevent lidocaine toxicity, patient should be patch free for 12 hrs  daily. 10 patch 0     losartan (COZAAR) 50 MG tablet Take 1 tablet (50 mg) by mouth daily as needed. for blood pressure. 90 tablet 1     metoprolol succinate ER (TOPROL XL) 50 MG 24 hr tablet Take 1 tablet (50 mg) by mouth daily. for blood pressure. 90 tablet 1     nabumetone (RELAFEN) 500 MG tablet Take 1 tablet (500 mg) by mouth 2 times daily (with meals). as needed for pain in arm or hip. 180 tablet 3     olopatadine (PATANOL) 0.1 % ophthalmic solution Place 1 drop into both eyes 2 times daily. 5 mL 11     pantoprazole (PROTONIX) 40 MG EC tablet TAKE 1 TABLET (40 MG) BY MOUTH DAILY FOR REFLUX. 90 tablet 1     polyethylene glycol-propylene glycol (SYSTANE) 0.4-0.3 % SOLN ophthalmic solution Place 1 drop into both eyes 4 times daily as needed for dry eyes. 15 mL 11     senna-docusate (SENEXON-S) 8.6-50 MG tablet TAKE 1 TABLET BY MOUTH DAILY AS NEEDED FOR CONSTIPATION. 30 tablet 1     simvastatin (ZOCOR) 20 MG tablet Take 1 tablet (20 mg) by mouth every evening. for cholesterol. 90 tablet 1     VITAMIN D3 25 MCG (1000 UT) tablet TAKE 1 TABLET BY MOUTH EVERY DAY 90 tablet 0         LMP  (LMP Unknown)       On examination, the patient is in her manual wheelchair.  She transfers with assist of her . She has involvement of  right pectoralis major, right biceps brachii, flexor carpi ulnaris and flexor carpi radialis, flexor digitorum superficialis and flexor digitorum profundus.  She has increased tone in the flexor pollicis longus and  lumbricals. In the lower extremity . EHL. FDL and gastrocnemius are tight.  She is also tender over the left side of the neck where she is complaining of pain currently including the occipital region.  The right side is nontender.  The low back is nontender.     Impression: Right spastic hemiparesis, right side dominant,  torticollis, gait abnormality and spasm of muscles due to cerebrovascular accident.  Cervical spondylosis on the left side.     Based on today's assessment, she  would benefit from 600 units of Botox injections.  I will see her in follow-up in about a month's time to see how she is responding and plan future treatments every 12 weeks.  Recommend using lidocaine patch for the palm to reduce the pain of injections.     20 minutes, exclusive of procedure, for the evaluation and management portion of the visit, all on the date of encounter greater than 50% was for CC.     Procedure note: With her informed consent, after explaining the benefits and risks of the procedure, and using Betasept for skin prep, EMG for localization, preservative-free normal saline for dilution. 1:1. , 600 units of Botox, 100 units were injected into the right pectoralis major,  25 needs to right flexor pollicis longus, 50 unis for injected into the following muscles lumbricals, 50 units into flexor carpi radialis, 50 units into flexor digitorum Superficialis, flexor digitorum profundus.  75 units each were injected into the biceps brachii,  50 units into FDL, tibialis posterior and 100 units into the gastrocnemius.. 600 units were utilized in all.  She tolerated it well.  She will use ice, Tylenol as needed.     Cheng Jean MD       Again, thank you for allowing me to participate in the care of your patient.      Sincerely,    Cheng Jean MD

## 2025-03-06 NOTE — PROGRESS NOTES
The patient returns for a follow-up visit for her ongoing issues related to spastic hemiparesis affecting the right side.     She has been  seen by me previously at [facility] stroke [place].      Last seen here on 12/12/2024.  Botox done helped. No falls. Feeling positive. Tolerating activities.  Does acknowledge some challenges with memory but is aware of her deficits.     She has a history of pain in the left side of the neck and the lower back.  She has previously responded nicely to injection therapy with medial branch blocks to the lumbar and cervical region.  Both diagnostic and confirmatory blocks were done with good relief.  This was done in July and August 2019.  She has done physical therapy without relief and continues to do home exercise program without relief.  She has undergone diagnostic medial branch blocks for the left cervical and lumbar spine.  Due to aphasia, her pain diary is difficult.  Her spouse tells me that she was happy, moving better and not complaining of her usual pain for the duration of the anesthetic.  This was about 3 hours for the neck and more than 6 hours for the lower back.  Her pain has since returned.  Due to the number of years since the last 1 was done,Shad repeat MRI of the cervical spine looking for any other causes for her pain.     1. Multilevel mild cervical spondylosis most apparent at C5-6 where  there is mild right-sided neural foraminal stenosis.  2. 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 This may also be important if  any surgery or intervention is planned.     I have personally reviewed the examination and initial interpretation  and I agree with the findings.     ANGEL BARKER MD      If her back and neck pain return, we could repeat the medial branch blocks from C2-C5 including the third occipital nerves on the left side and do  diagnostic and confirmatory test and if she gets good relief from this, she would be a candidate for radiofrequency ablations.  Due to her significant spasticity and stroke, she is not a candidate for the conventional physical therapy.  This may have to be done if insurance insists.  In the past she has had good relief with just the medial branch blocks and did not proceed to radiofrequency ablations.        She reports her pain and  function is better and she is more independent now.  She has history of sadness and is doing quite well currently and  is tolerating her antidepressant.        Past Medical History           Past Medical History:   Diagnosis Date    Acute ischemic left MCA stroke (H) 3/22/15    Essential hypertension, benign           Past Surgical History             Past Surgical History:   Procedure Laterality Date    NO HISTORY OF SURGERY             Current Outpatient Medications   Medication Sig Dispense Refill    aspirin (ASA) 325 MG tablet TAKE 1 TABLET BY MOUTH EVERY DAY 90 tablet 3    baclofen (LIORESAL) 20 MG tablet Take 1 tablet (20 mg) by mouth 3 times daily 270 tablet 3    escitalopram (LEXAPRO) 10 MG tablet Take 1 tablet (10 mg) by mouth daily 90 tablet 3    gabapentin (NEURONTIN) 400 MG capsule Take 1 capsule (400 mg) by mouth 3 times daily for arm mobility. 270 capsule 3    hydroCHLOROthiazide 12.5 MG tablet Take 1 tablet (12.5 mg) by mouth daily as needed. for blood pressure. 90 tablet 1    Lidocaine (LIDOCARE) 4 % Patch Place 1 patch over 12 hours onto the skin every 24 hours. To prevent lidocaine toxicity, patient should be patch free for 12 hrs daily. 10 patch 0    losartan (COZAAR) 50 MG tablet Take 1 tablet (50 mg) by mouth daily as needed. for blood pressure. 90 tablet 1    metoprolol succinate ER (TOPROL XL) 50 MG 24 hr tablet Take 1 tablet (50 mg) by mouth daily. for blood pressure. 90 tablet 1    nabumetone (RELAFEN) 500 MG tablet Take 1 tablet (500 mg) by mouth 2 times daily  (with meals). as needed for pain in arm or hip. 180 tablet 3    olopatadine (PATANOL) 0.1 % ophthalmic solution Place 1 drop into both eyes 2 times daily. 5 mL 11    pantoprazole (PROTONIX) 40 MG EC tablet TAKE 1 TABLET (40 MG) BY MOUTH DAILY FOR REFLUX. 90 tablet 1    polyethylene glycol-propylene glycol (SYSTANE) 0.4-0.3 % SOLN ophthalmic solution Place 1 drop into both eyes 4 times daily as needed for dry eyes. 15 mL 11    senna-docusate (SENEXON-S) 8.6-50 MG tablet TAKE 1 TABLET BY MOUTH DAILY AS NEEDED FOR CONSTIPATION. 30 tablet 1    simvastatin (ZOCOR) 20 MG tablet Take 1 tablet (20 mg) by mouth every evening. for cholesterol. 90 tablet 1    VITAMIN D3 25 MCG (1000 UT) tablet TAKE 1 TABLET BY MOUTH EVERY DAY 90 tablet 0         LMP  (LMP Unknown)       On examination, the patient is in her manual wheelchair.  She transfers with assist of her . She has involvement of  right pectoralis major, right biceps brachii, flexor carpi ulnaris and flexor carpi radialis, flexor digitorum superficialis and flexor digitorum profundus.  She has increased tone in the flexor pollicis longus and  lumbricals. In the lower extremity . EHL. FDL and gastrocnemius are tight.  She is also tender over the left side of the neck where she is complaining of pain currently including the occipital region.  The right side is nontender.  The low back is nontender.     Impression: Right spastic hemiparesis, right side dominant,  torticollis, gait abnormality and spasm of muscles due to cerebrovascular accident.  Cervical spondylosis on the left side.     Based on today's assessment, she would benefit from 600 units of Botox injections.  I will see her in follow-up in about a month's time to see how she is responding and plan future treatments every 12 weeks.  Recommend using lidocaine patch for the palm to reduce the pain of injections.     20 minutes, exclusive of procedure, for the evaluation and management portion of the visit, all  on the date of encounter greater than 50% was for CC.     Procedure note: With her informed consent, after explaining the benefits and risks of the procedure, and using Betasept for skin prep, EMG for localization, preservative-free normal saline for dilution. 1:1. , 600 units of Botox, 100 units were injected into the right pectoralis major,  25 needs to right flexor pollicis longus, 50 unis for injected into the following muscles lumbricals, 50 units into flexor carpi radialis, 50 units into flexor digitorum Superficialis, flexor digitorum profundus.  75 units each were injected into the biceps brachii,  50 units into FDL, tibialis posterior and 100 units into the gastrocnemius.. 600 units were utilized in all.  She tolerated it well.  She will use ice, Tylenol as needed.     Cheng Jean MD

## 2025-03-15 DIAGNOSIS — I69.920 APHASIA, LATE EFFECT OF CEREBROVASCULAR DISEASE: ICD-10-CM

## 2025-03-15 DIAGNOSIS — I10 ESSENTIAL HYPERTENSION WITH GOAL BLOOD PRESSURE LESS THAN 140/90: ICD-10-CM

## 2025-03-15 DIAGNOSIS — Z86.73 HISTORY OF CVA (CEREBROVASCULAR ACCIDENT): ICD-10-CM

## 2025-03-17 ENCOUNTER — OFFICE VISIT (OUTPATIENT)
Dept: FAMILY MEDICINE | Facility: CLINIC | Age: 65
End: 2025-03-17
Payer: MEDICARE

## 2025-03-17 VITALS
HEART RATE: 60 BPM | RESPIRATION RATE: 18 BRPM | OXYGEN SATURATION: 93 % | SYSTOLIC BLOOD PRESSURE: 128 MMHG | HEIGHT: 60 IN | BODY MASS INDEX: 29.45 KG/M2 | DIASTOLIC BLOOD PRESSURE: 70 MMHG | TEMPERATURE: 97.3 F

## 2025-03-17 DIAGNOSIS — Z23 NEED FOR VACCINATION: ICD-10-CM

## 2025-03-17 DIAGNOSIS — Z28.21 VACCINATION NOT CARRIED OUT BECAUSE OF PATIENT REFUSAL: ICD-10-CM

## 2025-03-17 DIAGNOSIS — K21.9 GASTROESOPHAGEAL REFLUX DISEASE WITHOUT ESOPHAGITIS: ICD-10-CM

## 2025-03-17 DIAGNOSIS — I10 ESSENTIAL HYPERTENSION WITH GOAL BLOOD PRESSURE LESS THAN 140/90: ICD-10-CM

## 2025-03-17 DIAGNOSIS — I69.920 APHASIA, LATE EFFECT OF CEREBROVASCULAR DISEASE: ICD-10-CM

## 2025-03-17 DIAGNOSIS — Z86.73 HISTORY OF CVA (CEREBROVASCULAR ACCIDENT): Primary | ICD-10-CM

## 2025-03-17 PROBLEM — M47.812 CERVICAL SPONDYLOSIS WITHOUT MYELOPATHY: Status: ACTIVE | Noted: 2021-12-17

## 2025-03-17 LAB
ALT SERPL W P-5'-P-CCNC: 27 U/L (ref 0–50)
AST SERPL W P-5'-P-CCNC: 37 U/L (ref 0–45)
CHOLEST SERPL-MCNC: 153 MG/DL
CREAT SERPL-MCNC: 0.98 MG/DL (ref 0.51–0.95)
EGFRCR SERPLBLD CKD-EPI 2021: 65 ML/MIN/1.73M2
ERYTHROCYTE [DISTWIDTH] IN BLOOD BY AUTOMATED COUNT: 11.8 % (ref 10–15)
FASTING STATUS PATIENT QL REPORTED: YES
HCT VFR BLD AUTO: 35.8 % (ref 35–47)
HDLC SERPL-MCNC: 33 MG/DL
HGB BLD-MCNC: 11.7 G/DL (ref 11.7–15.7)
LDLC SERPL CALC-MCNC: 77 MG/DL
MCH RBC QN AUTO: 31.6 PG (ref 26.5–33)
MCHC RBC AUTO-ENTMCNC: 32.7 G/DL (ref 31.5–36.5)
MCV RBC AUTO: 97 FL (ref 78–100)
NONHDLC SERPL-MCNC: 120 MG/DL
PLATELET # BLD AUTO: 186 10E3/UL (ref 150–450)
POTASSIUM SERPL-SCNC: 4.1 MMOL/L (ref 3.4–5.3)
RBC # BLD AUTO: 3.7 10E6/UL (ref 3.8–5.2)
TRIGL SERPL-MCNC: 217 MG/DL
WBC # BLD AUTO: 4.9 10E3/UL (ref 4–11)

## 2025-03-17 PROCEDURE — 85027 COMPLETE CBC AUTOMATED: CPT | Performed by: FAMILY MEDICINE

## 2025-03-17 PROCEDURE — G2211 COMPLEX E/M VISIT ADD ON: HCPCS | Performed by: FAMILY MEDICINE

## 2025-03-17 PROCEDURE — 80061 LIPID PANEL: CPT | Performed by: FAMILY MEDICINE

## 2025-03-17 PROCEDURE — 90677 PCV20 VACCINE IM: CPT | Performed by: FAMILY MEDICINE

## 2025-03-17 PROCEDURE — 82565 ASSAY OF CREATININE: CPT | Performed by: FAMILY MEDICINE

## 2025-03-17 PROCEDURE — G0009 ADMIN PNEUMOCOCCAL VACCINE: HCPCS | Performed by: FAMILY MEDICINE

## 2025-03-17 PROCEDURE — 36415 COLL VENOUS BLD VENIPUNCTURE: CPT | Performed by: FAMILY MEDICINE

## 2025-03-17 PROCEDURE — 84132 ASSAY OF SERUM POTASSIUM: CPT | Performed by: FAMILY MEDICINE

## 2025-03-17 PROCEDURE — 99214 OFFICE O/P EST MOD 30 MIN: CPT | Performed by: FAMILY MEDICINE

## 2025-03-17 PROCEDURE — 3074F SYST BP LT 130 MM HG: CPT | Performed by: FAMILY MEDICINE

## 2025-03-17 PROCEDURE — 84450 TRANSFERASE (AST) (SGOT): CPT | Performed by: FAMILY MEDICINE

## 2025-03-17 PROCEDURE — T1013 SIGN LANG/ORAL INTERPRETER: HCPCS

## 2025-03-17 PROCEDURE — 1126F AMNT PAIN NOTED NONE PRSNT: CPT | Performed by: FAMILY MEDICINE

## 2025-03-17 PROCEDURE — 84460 ALANINE AMINO (ALT) (SGPT): CPT | Performed by: FAMILY MEDICINE

## 2025-03-17 PROCEDURE — 3078F DIAST BP <80 MM HG: CPT | Performed by: FAMILY MEDICINE

## 2025-03-17 RX ORDER — LOSARTAN POTASSIUM 50 MG/1
50 TABLET ORAL DAILY PRN
Qty: 90 TABLET | Refills: 1 | Status: SHIPPED | OUTPATIENT
Start: 2025-03-17

## 2025-03-17 RX ORDER — PANTOPRAZOLE SODIUM 40 MG/1
40 TABLET, DELAYED RELEASE ORAL DAILY
Qty: 90 TABLET | Refills: 1 | Status: SHIPPED | OUTPATIENT
Start: 2025-03-17

## 2025-03-17 RX ORDER — SIMVASTATIN 20 MG
20 TABLET ORAL EVERY EVENING
Qty: 90 TABLET | Refills: 1 | OUTPATIENT
Start: 2025-03-17

## 2025-03-17 RX ORDER — METOPROLOL SUCCINATE 50 MG/1
50 TABLET, EXTENDED RELEASE ORAL DAILY
Qty: 90 TABLET | Refills: 1 | Status: SHIPPED | OUTPATIENT
Start: 2025-03-17

## 2025-03-17 RX ORDER — METOPROLOL SUCCINATE 50 MG/1
50 TABLET, EXTENDED RELEASE ORAL DAILY
Qty: 90 TABLET | Refills: 1 | OUTPATIENT
Start: 2025-03-17

## 2025-03-17 RX ORDER — SIMVASTATIN 20 MG
20 TABLET ORAL EVERY EVENING
Qty: 90 TABLET | Refills: 1 | Status: SHIPPED | OUTPATIENT
Start: 2025-03-17

## 2025-03-17 RX ORDER — HYDROCHLOROTHIAZIDE 12.5 MG/1
12.5 TABLET ORAL DAILY PRN
Qty: 90 TABLET | Refills: 1 | Status: CANCELLED | OUTPATIENT
Start: 2025-03-17

## 2025-03-17 ASSESSMENT — PATIENT HEALTH QUESTIONNAIRE - PHQ9: SUM OF ALL RESPONSES TO PHQ QUESTIONS 1-9: 3

## 2025-03-17 ASSESSMENT — PAIN SCALES - GENERAL: PAINLEVEL_OUTOF10: NO PAIN (0)

## 2025-03-17 NOTE — PATIENT INSTRUCTIONS
At [facility], we strive to deliver an exceptional experience to you, every time we see you. If you receive a survey, please let us know what we are doing well and/or what we could improve upon, as we do value your feedback.  If you have MyChart, you can expect to receive results automatically within 24 hours of their completion.  Your provider will send a note interpreting your results as well.   If you do not have MyChart, you should receive your results in about a week by mail.    Your care team:                            Family Medicine Internal Medicine   MD Sánchez Miller, MD Tamra Mauricio, MD Storm Archibald, MD Sneha Holm, PALianneC    Gregory Osborne, MD Pediatrics   Rafaela Wetzel, MD Stacey Gallegos, MD Amaya Choudhury, APRN CNP Marleny Umaña APRN CNP   MD Radha Tong, MD Maryann Shi, CNP     Kevin Zamora, CNP Same-Day Provider (No follow-up visits)   ELKIN Salazar, DNP Una Coombs, ELKIN Hayes, FNP, BC JARRED HortonC     Clinic hours: Monday - Thursday 7 am-6 pm; Fridays 7 am-5 pm.   Urgent care: Monday - Friday 10 am- 8 pm; Saturday and Sunday 9 am-5 pm.    Clinic: [phone]       [facility]: Monday - Thursday 8 am - 7 pm; Friday 8 am - 6 pm  [facility]: [phone]

## 2025-03-17 NOTE — PROGRESS NOTES
Assessment & Plan     (Z86.73) History of CVA (cerebrovascular accident)  (primary encounter diagnosis)  (I69.920) Aphasia, late effect of cerebrovascular disease  Comment: She continues on a moderate-intensity statin.  Plan: simvastatin (ZOCOR) 20 MG tablet, Lipid panel         reflex to direct LDL Non-fasting, ALT, AST        Return in about 6 months (around 9/30/2025) for Medicare annual wellness exam, cholesterol, blood pressure check.      (I10) Essential hypertension with goal blood pressure less than 140/90  Comment: well controlled.  Plan: losartan (COZAAR) 50 MG tablet, metoprolol         succinate ER (TOPROL XL) 50 MG 24 hr tablet,         Potassium, Creatinine, CBC with platelets        Return in about 6 months (around 9/30/2025) for Medicare annual wellness exam, cholesterol, blood pressure check.      (K21.9) Gastroesophageal reflux disease without esophagitis  Comment: prescription update. The patient confirms that she continues to have GERD symptoms and would need to continue on a PPI.  Plan: pantoprazole (PROTONIX) 40 MG EC tablet            (Z23) Need for vaccination  Comment:   Plan: Pneumococcal 20 Valent Conjugate (PCV20)            (Z28.21) Vaccination not carried out because of patient refusal  Comment: COVID-19.   Plan: After discussion, the patient and her  confirm that she would prefer to take the risk of being unvaccinated over the risk of experiencing side effects like she has with previous doses.     Nik Howard is a 64 year old, presenting for the following health issues:  Hypertension        3/17/2025     9:41 AM   Additional Questions   Roomed by Jocelyn   Accompanied by Spouse     HPI    Hyperlipidemia Follow-Up    Are you regularly taking any medication or supplement to lower your cholesterol?   Yes- Zocor  Are you having muscle aches or other side effects that you think could be caused by your cholesterol lowering medication?  No    Hypertension Follow-up    Do you  check your blood pressure regularly outside of the clinic? Yes   Are you following a low salt diet? Yes  Are your blood pressures ever more than 140 on the top number (systolic) OR more   than 90 on the bottom number (diastolic), for example 140/90? Yes  How many servings of fruits and vegetables do you eat daily?  2-3  On average, how many sweetened beverages do you drink each day (Examples: soda, juice, sweet tea, etc.  Do NOT count diet or artificially sweetened beverages)?   0  How many days per week do you exercise enough to make your heart beat faster? 7  How many minutes a day do you exercise enough to make your heart beat faster? 9 or less  How many days per week do you miss taking your medication? 0      Review of Systems        Objective    /70 (BP Location: Left arm, Patient Position: Chair, Cuff Size: Adult Large)   Pulse 60   Temp 97.3  F (36.3  C) (Temporal)   Resp 18   Ht 1.524 m (5')   LMP  (LMP Unknown)   SpO2 93%   BMI 29.45 kg/m    Body mass index is 29.45 kg/m .    Physical Exam   GENERAL: healthy, alert and no distress  EYES: Eyes grossly normal to inspection, PERRL, EOMI, sclerae white and conjunctivae normal  RESP: lungs clear to auscultation - no crackles or wheezes, no areas of dullness, no tachypnea  CV: Heart regular rate and rhythm without murmur, click or rub. No peripheral edema and peripheral pulses strong  MS: no gross musculoskeletal defects noted, no edema  SKIN: no suspicious lesions or rashes to visible skin  NEURO: Normal strength and tone, sensory exam grossly normal, mentation intact, oriented times 3 and cranial nerves 2-12 intact  PSYCH: mentation appears normal, affect normal/bright       This document serves as a record of the services and decisions personally performed and made by Dr. Sharma. It was created on his behalf by Tammie Tran, a trained medical scribe. The creation of this document is based the provider's statements to the medical  tyshawn.  Tammie Tran, 10:12 AM         Signed Electronically by: Mulugeta Sharma MD

## 2025-03-17 NOTE — NURSING NOTE
Prior to immunization administration, verified patients identity using patient s name and date of birth. Please see Immunization Activity for additional information.     Screening Questionnaire for Adult Immunization    Are you sick today?   No   Do you have allergies to medications, food, a vaccine component or latex?   No   Have you ever had a serious reaction after receiving a vaccination?   No   Do you have a long-term health problem with heart, lung, kidney, or metabolic disease (e.g., diabetes), asthma, a blood disorder, no spleen, complement component deficiency, a cochlear implant, or a spinal fluid leak?  Are you on long-term aspirin therapy?   Yes   Do you have cancer, leukemia, HIV/AIDS, or any other immune system problem?   No   Do you have a parent, brother, or sister with an immune system problem?   No   In the past 3 months, have you taken medications that affect  your immune system, such as prednisone, other steroids, or anticancer drugs; drugs for the treatment of rheumatoid arthritis, Crohn s disease, or psoriasis; or have you had radiation treatments?   No   Have you had a seizure, or a brain or other nervous system problem?   No   During the past year, have you received a transfusion of blood or blood    products, or been given immune (gamma) globulin or antiviral drug?   No   For women: Are you pregnant or is there a chance you could become       pregnant during the next month?   No   Have you received any vaccinations in the past 4 weeks?   No     Patient instructed to remain in clinic for 15 minutes afterwards, and to report any adverse reactions.     Screening performed by Christina Chowdhury MA on 3/17/2025 at 10:30 AM.

## 2025-05-22 ENCOUNTER — ANCILLARY PROCEDURE (OUTPATIENT)
Dept: MAMMOGRAPHY | Facility: CLINIC | Age: 65
End: 2025-05-22
Attending: FAMILY MEDICINE
Payer: MEDICARE

## 2025-05-22 DIAGNOSIS — Z12.31 VISIT FOR SCREENING MAMMOGRAM: ICD-10-CM

## 2025-05-22 PROCEDURE — 77067 SCR MAMMO BI INCL CAD: CPT | Mod: GC

## 2025-05-22 PROCEDURE — 77063 BREAST TOMOSYNTHESIS BI: CPT | Mod: GC

## 2025-05-29 ENCOUNTER — OFFICE VISIT (OUTPATIENT)
Dept: PHYSICAL MEDICINE AND REHAB | Facility: CLINIC | Age: 65
End: 2025-05-29
Payer: MEDICARE

## 2025-05-29 DIAGNOSIS — I69.351 SPASTIC HEMIPLEGIA OF RIGHT DOMINANT SIDE AS LATE EFFECT OF CEREBRAL INFARCTION (H): Primary | ICD-10-CM

## 2025-05-29 DIAGNOSIS — R26.9 ABNORMAL GAIT: ICD-10-CM

## 2025-05-29 DIAGNOSIS — M62.838 SPASM OF MUSCLE: ICD-10-CM

## 2025-05-29 DIAGNOSIS — G24.8 FOCAL DYSTONIA: ICD-10-CM

## 2025-05-29 NOTE — PROGRESS NOTES
The patient returns for a follow-up visit for her ongoing issues related to spastic hemiparesis affecting the right side.     She has been  seen by me previously at [facility] stroke [place].      Last seen here on 3/6/25.  She reports that she is doing well from a pain perspective and from a depression perspective. Botox done helped. No falls. Feeling positive. Tolerating activities.  Does acknowledge some challenges with memory but is aware of her deficits.     She has a history of pain in the left side of the neck and the lower back.  She has previously responded nicely to injection therapy with medial branch blocks to the lumbar and cervical region.  Both diagnostic and confirmatory blocks were done with good relief.  This was done in July and August 2019.  She has done physical therapy without relief and continues to do home exercise program without relief.  She has undergone diagnostic medial branch blocks for the left cervical and lumbar spine.  Due to aphasia, her pain diary is difficult.  Her spouse tells me that she was happy, moving better and not complaining of her usual pain for the duration of the anesthetic.  This was about 3 hours for the neck and more than 6 hours for the lower back.  Her pain has since returned.  Due to the number of years since the last 1 was done,Shad repeat MRI of the cervical spine looking for any other causes for her pain.     1. Multilevel mild cervical spondylosis most apparent at C5-6 where  there is mild right-sided neural foraminal stenosis.  2. 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 This may also be important if  any surgery or intervention is planned.     I have personally reviewed the examination and initial interpretation  and I agree with the findings.     ANGEL BARKER MD      If her back and neck pain return, we could repeat the  medial branch blocks from C2-C5 including the third occipital nerves on the left side and do diagnostic and confirmatory test and if she gets good relief from this, she would be a candidate for radiofrequency ablations.  Due to her significant spasticity and stroke, she is not a candidate for the conventional physical therapy.  This may have to be done if insurance insists.  In the past she has had good relief with just the medial branch blocks and did not proceed to radiofrequency ablations.        She reports her pain and  function is better and she is more independent now.  She has history of sadness and is doing quite well currently and  is tolerating her antidepressant.        Past Medical History           Past Medical History:   Diagnosis Date    Acute ischemic left MCA stroke (H) 3/22/15    Essential hypertension, benign           Past Surgical History             Past Surgical History:   Procedure Laterality Date    NO HISTORY OF SURGERY             Current Outpatient Medications   Medication Sig Dispense Refill    aspirin (ASA) 325 MG tablet TAKE 1 TABLET BY MOUTH EVERY DAY 90 tablet 3    baclofen (LIORESAL) 20 MG tablet Take 1 tablet (20 mg) by mouth 3 times daily 270 tablet 3    escitalopram (LEXAPRO) 10 MG tablet Take 1 tablet (10 mg) by mouth daily 90 tablet 3    gabapentin (NEURONTIN) 400 MG capsule Take 1 capsule (400 mg) by mouth 3 times daily for arm mobility. 270 capsule 3    hydroCHLOROthiazide 12.5 MG tablet Take 1 tablet (12.5 mg) by mouth daily as needed. for blood pressure. 90 tablet 1    Lidocaine (LIDOCARE) 4 % Patch Place 1 patch over 12 hours onto the skin every 24 hours. To prevent lidocaine toxicity, patient should be patch free for 12 hrs daily. 10 patch 0    losartan (COZAAR) 50 MG tablet Take 1 tablet (50 mg) by mouth daily as needed. for blood pressure. 90 tablet 1    metoprolol succinate ER (TOPROL XL) 50 MG 24 hr tablet Take 1 tablet (50 mg) by mouth daily. for blood pressure. 90  tablet 1    nabumetone (RELAFEN) 500 MG tablet Take 1 tablet (500 mg) by mouth 2 times daily (with meals). as needed for pain in arm or hip. 180 tablet 3    olopatadine (PATANOL) 0.1 % ophthalmic solution Place 1 drop into both eyes 2 times daily. 5 mL 11    pantoprazole (PROTONIX) 40 MG EC tablet Take 1 tablet (40 mg) by mouth daily. for reflux. 90 tablet 1    polyethylene glycol-propylene glycol (SYSTANE) 0.4-0.3 % SOLN ophthalmic solution Place 1 drop into both eyes 4 times daily as needed for dry eyes. 15 mL 11    simvastatin (ZOCOR) 20 MG tablet Take 1 tablet (20 mg) by mouth every evening. for cholesterol. 90 tablet 1    VITAMIN D3 25 MCG (1000 UT) tablet TAKE 1 TABLET BY MOUTH EVERY DAY 90 tablet 0          LMP  (LMP Unknown)       On examination, the patient is in her manual wheelchair.  She transfers with assist of her . She has involvement of  right pectoralis major, right biceps brachii, flexor carpi ulnaris and flexor carpi radialis, flexor digitorum superficialis and flexor digitorum profundus.  She has increased tone in the flexor pollicis longus and  lumbricals. In the lower extremity . EHL. FDL and gastrocnemius are tight.  She is also tender over the left side of the neck where she is complaining of pain currently including the occipital region.  The right side is nontender.  The low back is nontender.     Impression: Right spastic hemiparesis, right side dominant,  torticollis, gait abnormality and spasm of muscles due to cerebrovascular accident.  Cervical spondylosis on the left side.     Based on today's assessment, she would benefit from 600 units of Botox injections.  I will see her in follow-up in about a month's time to see how she is responding and plan future treatments every 12 weeks.  Recommend using lidocaine patch for the palm to reduce the pain of injections.     20 minutes, exclusive of procedure, for the evaluation and management portion of the visit, all on the date of  encounter greater than 50% was for CC.     Procedure note: With her informed consent, after explaining the benefits and risks of the procedure, and using Betasept for skin prep, EMG for localization, preservative-free normal saline for dilution. 1:1. , 600 units of Botox, 100 units were injected into the right pectoralis major,  25 needs to right flexor pollicis longus, 50 unis for injected into the following muscles lumbricals, 50 units into flexor carpi radialis, 50 units into flexor digitorum Superficialis, flexor digitorum profundus.  75 units each were injected into the biceps brachii,  50 units into FDL, tibialis posterior and 100 units into the gastrocnemius medial and lateral heads and soleus. 600 units were utilized in all.      She tolerated it well.  She will use ice, Tylenol as needed.     Cheng Jean MD

## 2025-05-29 NOTE — LETTER
5/29/2025       RE: Anita Bennett  [address]  [place] [address]     Dear Colleague,    Thank you for referring your patient, Anita Bennett, to the [facility] [place] at [facility]. Please see a copy of my visit note below.    The patient returns for a follow-up visit for her ongoing issues related to spastic hemiparesis affecting the right side.     She has been  seen by me previously at [facility] stroke [place].      Last seen here on 3/6/25.  She reports that she is doing well from a pain perspective and from a depression perspective. Botox done helped. No falls. Feeling positive. Tolerating activities.  Does acknowledge some challenges with memory but is aware of her deficits.     She has a history of pain in the left side of the neck and the lower back.  She has previously responded nicely to injection therapy with medial branch blocks to the lumbar and cervical region.  Both diagnostic and confirmatory blocks were done with good relief.  This was done in July and August 2019.  She has done physical therapy without relief and continues to do home exercise program without relief.  She has undergone diagnostic medial branch blocks for the left cervical and lumbar spine.  Due to aphasia, her pain diary is difficult.  Her spouse tells me that she was happy, moving better and not complaining of her usual pain for the duration of the anesthetic.  This was about 3 hours for the neck and more than 6 hours for the lower back.  Her pain has since returned.  Due to the number of years since the last 1 was done,Shad repeat MRI of the cervical spine looking for any other causes for her pain.     1. Multilevel mild cervical spondylosis most apparent at C5-6 where  there is mild right-sided neural foraminal stenosis.  2. 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 This may also be important if  any surgery or intervention is planned.     I have personally reviewed the examination and initial interpretation  and I agree with the findings.     ANGEL BARKER MD      If her back and neck pain return, we could repeat the medial branch blocks from C2-C5 including the third occipital nerves on the left side and do diagnostic and confirmatory test and if she gets good relief from this, she would be a candidate for radiofrequency ablations.  Due to her significant spasticity and stroke, she is not a candidate for the conventional physical therapy.  This may have to be done if insurance insists.  In the past she has had good relief with just the medial branch blocks and did not proceed to radiofrequency ablations.        She reports her pain and  function is better and she is more independent now.  She has history of sadness and is doing quite well currently and  is tolerating her antidepressant.        Past Medical History           Past Medical History:   Diagnosis Date     Acute ischemic left MCA stroke (H) 3/22/15     Essential hypertension, benign           Past Surgical History             Past Surgical History:   Procedure Laterality Date     NO HISTORY OF SURGERY             Current Outpatient Medications   Medication Sig Dispense Refill     aspirin (ASA) 325 MG tablet TAKE 1 TABLET BY MOUTH EVERY DAY 90 tablet 3     baclofen (LIORESAL) 20 MG tablet Take 1 tablet (20 mg) by mouth 3 times daily 270 tablet 3     escitalopram (LEXAPRO) 10 MG tablet Take 1 tablet (10 mg) by mouth daily 90 tablet 3     gabapentin (NEURONTIN) 400 MG capsule Take 1 capsule (400 mg) by mouth 3 times daily for arm mobility. 270 capsule 3     hydroCHLOROthiazide 12.5 MG tablet Take 1 tablet (12.5 mg) by mouth daily as needed. for blood pressure. 90 tablet 1     Lidocaine (LIDOCARE) 4 % Patch Place 1 patch over 12 hours onto  the skin every 24 hours. To prevent lidocaine toxicity, patient should be patch free for 12 hrs daily. 10 patch 0     losartan (COZAAR) 50 MG tablet Take 1 tablet (50 mg) by mouth daily as needed. for blood pressure. 90 tablet 1     metoprolol succinate ER (TOPROL XL) 50 MG 24 hr tablet Take 1 tablet (50 mg) by mouth daily. for blood pressure. 90 tablet 1     nabumetone (RELAFEN) 500 MG tablet Take 1 tablet (500 mg) by mouth 2 times daily (with meals). as needed for pain in arm or hip. 180 tablet 3     olopatadine (PATANOL) 0.1 % ophthalmic solution Place 1 drop into both eyes 2 times daily. 5 mL 11     pantoprazole (PROTONIX) 40 MG EC tablet Take 1 tablet (40 mg) by mouth daily. for reflux. 90 tablet 1     polyethylene glycol-propylene glycol (SYSTANE) 0.4-0.3 % SOLN ophthalmic solution Place 1 drop into both eyes 4 times daily as needed for dry eyes. 15 mL 11     simvastatin (ZOCOR) 20 MG tablet Take 1 tablet (20 mg) by mouth every evening. for cholesterol. 90 tablet 1     VITAMIN D3 25 MCG (1000 UT) tablet TAKE 1 TABLET BY MOUTH EVERY DAY 90 tablet 0          LMP  (LMP Unknown)       On examination, the patient is in her manual wheelchair.  She transfers with assist of her . She has involvement of  right pectoralis major, right biceps brachii, flexor carpi ulnaris and flexor carpi radialis, flexor digitorum superficialis and flexor digitorum profundus.  She has increased tone in the flexor pollicis longus and  lumbricals. In the lower extremity . EHL. FDL and gastrocnemius are tight.  She is also tender over the left side of the neck where she is complaining of pain currently including the occipital region.  The right side is nontender.  The low back is nontender.     Impression: Right spastic hemiparesis, right side dominant,  torticollis, gait abnormality and spasm of muscles due to cerebrovascular accident.  Cervical spondylosis on the left side.     Based on today's assessment, she would benefit from  600 units of Botox injections.  I will see her in follow-up in about a month's time to see how she is responding and plan future treatments every 12 weeks.  Recommend using lidocaine patch for the palm to reduce the pain of injections.     20 minutes, exclusive of procedure, for the evaluation and management portion of the visit, all on the date of encounter greater than 50% was for CC.     Procedure note: With her informed consent, after explaining the benefits and risks of the procedure, and using Betasept for skin prep, EMG for localization, preservative-free normal saline for dilution. 1:1. , 600 units of Botox, 100 units were injected into the right pectoralis major,  25 needs to right flexor pollicis longus, 50 unis for injected into the following muscles lumbricals, 50 units into flexor carpi radialis, 50 units into flexor digitorum Superficialis, flexor digitorum profundus.  75 units each were injected into the biceps brachii,  50 units into FDL, tibialis posterior and 100 units into the gastrocnemius medial and lateral heads and soleus. 600 units were utilized in all.      She tolerated it well.  She will use ice, Tylenol as needed.     Cheng Jean MD     Again, thank you for allowing me to participate in the care of your patient.      Sincerely,    Cheng Jaen MD

## 2025-06-05 ENCOUNTER — PATIENT OUTREACH (OUTPATIENT)
Dept: CARE COORDINATION | Facility: CLINIC | Age: 65
End: 2025-06-05
Payer: MEDICARE

## 2025-06-14 DIAGNOSIS — Z86.73 HISTORY OF CVA (CEREBROVASCULAR ACCIDENT): ICD-10-CM

## 2025-06-14 DIAGNOSIS — I69.920 APHASIA, LATE EFFECT OF CEREBROVASCULAR DISEASE: ICD-10-CM

## 2025-06-16 RX ORDER — SIMVASTATIN 20 MG
TABLET ORAL
Qty: 90 TABLET | Refills: 1 | OUTPATIENT
Start: 2025-06-16

## 2025-07-09 ENCOUNTER — MYC REFILL (OUTPATIENT)
Dept: PHYSICAL MEDICINE AND REHAB | Facility: CLINIC | Age: 65
End: 2025-07-09
Payer: MEDICARE

## 2025-07-09 DIAGNOSIS — R26.9 ABNORMAL GAIT: ICD-10-CM

## 2025-07-09 DIAGNOSIS — M25.511 RIGHT SHOULDER PAIN, UNSPECIFIED CHRONICITY: ICD-10-CM

## 2025-07-09 DIAGNOSIS — G81.11 RIGHT SPASTIC HEMIPARESIS (H): ICD-10-CM

## 2025-07-09 DIAGNOSIS — M79.601 RIGHT UPPER LIMB PAIN: ICD-10-CM

## 2025-07-09 DIAGNOSIS — G89.29 OTHER CHRONIC PAIN: ICD-10-CM

## 2025-07-10 RX ORDER — GABAPENTIN 400 MG/1
400 CAPSULE ORAL 3 TIMES DAILY
Qty: 270 CAPSULE | Refills: 3 | Status: SHIPPED | OUTPATIENT
Start: 2025-07-10

## 2025-07-10 NOTE — TELEPHONE ENCOUNTER
Refill request received from patient via [facility] Refill Request    Medication: gabapentin (NEURONTIN) 400 MG capsule   Directions: Take 1 capsule (400 mg) by mouth 3 times daily for arm mobility      Last ordered: 7/9/24   Qty: #270  RF: 3  Last OV: 5/29/25  Next OV: 8/21/25    Routing to Dr. Jean to review and sign if appropriate.    Simona Aponte, RN Care Coordinator  [facility]   [facility] main phone # [phone] fax # [phone]

## 2025-08-21 ENCOUNTER — OFFICE VISIT (OUTPATIENT)
Dept: PHYSICAL MEDICINE AND REHAB | Facility: CLINIC | Age: 65
End: 2025-08-21
Payer: MEDICARE

## 2025-08-21 DIAGNOSIS — G24.8 FOCAL DYSTONIA: ICD-10-CM

## 2025-08-21 DIAGNOSIS — M62.838 SPASM OF MUSCLE: ICD-10-CM

## 2025-08-21 DIAGNOSIS — I69.351 SPASTIC HEMIPLEGIA OF RIGHT DOMINANT SIDE AS LATE EFFECT OF CEREBRAL INFARCTION (H): Primary | ICD-10-CM

## 2025-08-21 DIAGNOSIS — R26.9 ABNORMAL GAIT: ICD-10-CM

## 2025-08-25 ENCOUNTER — PATIENT OUTREACH (OUTPATIENT)
Dept: CARE COORDINATION | Facility: CLINIC | Age: 65
End: 2025-08-25
Payer: MEDICARE

## 2025-08-26 ENCOUNTER — TRANSFERRED RECORDS (OUTPATIENT)
Dept: ADMINISTRATIVE | Facility: CLINIC | Age: 65
End: 2025-08-26
Payer: MEDICARE

## 2025-08-28 PROBLEM — D12.6 TUBULAR ADENOMA OF COLON: Status: ACTIVE | Noted: 2025-08-28

## (undated) DEVICE — PREP CHLORAPREP W/ORANGE TINT 10.5ML 260715

## (undated) DEVICE — NDL SPINAL 25GA 3.5" QUINCKE 405180

## (undated) DEVICE — TRAY PAIN INJECTION 97A 640

## (undated) DEVICE — GLOVE ESTEEM POWDER FREE 7.0  2D72PL70